# Patient Record
Sex: FEMALE | Race: WHITE | Employment: OTHER | URBAN - METROPOLITAN AREA
[De-identification: names, ages, dates, MRNs, and addresses within clinical notes are randomized per-mention and may not be internally consistent; named-entity substitution may affect disease eponyms.]

---

## 2017-01-03 ENCOUNTER — TELEPHONE (OUTPATIENT)
Dept: SURGERY | Facility: CLINIC | Age: 77
End: 2017-01-03

## 2017-01-09 ENCOUNTER — OFFICE VISIT (OUTPATIENT)
Dept: SURGERY | Facility: CLINIC | Age: 77
End: 2017-01-09

## 2017-01-09 VITALS
DIASTOLIC BLOOD PRESSURE: 62 MMHG | SYSTOLIC BLOOD PRESSURE: 122 MMHG | WEIGHT: 198 LBS | TEMPERATURE: 99 F | BODY MASS INDEX: 38.87 KG/M2 | HEIGHT: 60 IN | RESPIRATION RATE: 24 BRPM | HEART RATE: 88 BPM

## 2017-01-09 DIAGNOSIS — M50.30 DDD (DEGENERATIVE DISC DISEASE), CERVICAL: Primary | ICD-10-CM

## 2017-01-09 DIAGNOSIS — M50.30 DDD (DEGENERATIVE DISC DISEASE), CERVICAL: ICD-10-CM

## 2017-01-09 DIAGNOSIS — Z98.890 POST-OPERATIVE STATE: Primary | ICD-10-CM

## 2017-01-09 PROCEDURE — 99024 POSTOP FOLLOW-UP VISIT: CPT | Performed by: NEUROLOGICAL SURGERY

## 2017-01-09 RX ORDER — OMEGA-3-ACID ETHYL ESTERS 1 G/1
1 CAPSULE, LIQUID FILLED ORAL DAILY
Status: ON HOLD | COMMUNITY
End: 2017-03-31

## 2017-01-09 NOTE — PATIENT INSTRUCTIONS
Refill policies:    • Allow 2 business days for refills; controlled substances may take longer.   • Contact your pharmacy at least 5 days prior to running out of medication and have them send an electronic request or submit request through the “request re your physician has recommended that you have a procedure or additional testing performed. [facility]) will contact your insurance carrier to obtain pre-certification or prior authorization.     Unfortunately, ALICE has seen an increas

## 2017-01-09 NOTE — PROGRESS NOTES
[facility]   Outpatient Neurological Surgery Follow Up    Sahil Melara  : 1/10/1940  2017  PCP: Oc Espinosa MD  Referring Provider: No ref. provider found    REASON FOR VISIT:Patient presents with:   Other: fo patient will be scheduled for surgery to redirect the C6 screws in approximately 2 weeks after the patient gets over her cold. She will need new preoperative clearance and labs.   Also before the surgery I have ordered an MRI of the cervical spine without

## 2017-02-08 ENCOUNTER — TELEPHONE (OUTPATIENT)
Dept: SURGERY | Facility: CLINIC | Age: 77
End: 2017-02-08

## 2017-02-08 NOTE — TELEPHONE ENCOUNTER
LMTCB. Need to touch base with patient. She is to be scheduled for surgery, but was seen by her PCP for a cough and was going out of town. Need to see if patient would like to get scheduled for surgery.

## 2017-02-09 NOTE — TELEPHONE ENCOUNTER
Patient called back stating she is still having sinus issues. She is going to be following back up with her PCP. Also reviewed LOV plan with patient. Patient understood and will call our office back once she is ready to schedule surgery.  Patient was thankf

## 2017-02-13 ENCOUNTER — TELEPHONE (OUTPATIENT)
Dept: SURGERY | Facility: CLINIC | Age: 77
End: 2017-02-13

## 2017-02-13 NOTE — TELEPHONE ENCOUNTER
Patient asking if she has metal from her surgery with Dr. Sarah Jaeger  Informed her that she has titanium cervical plate  No other questions at this time.

## 2017-02-20 ENCOUNTER — HOSPITAL ENCOUNTER (OUTPATIENT)
Dept: MRI IMAGING | Facility: HOSPITAL | Age: 77
Discharge: HOME | End: 2017-02-20
Attending: NEUROLOGICAL SURGERY
Payer: MEDICARE

## 2017-02-20 DIAGNOSIS — M50.30 DDD (DEGENERATIVE DISC DISEASE), CERVICAL: ICD-10-CM

## 2017-02-20 PROCEDURE — 72141 MRI NECK SPINE W/O DYE: CPT

## 2017-02-24 ENCOUNTER — TELEPHONE (OUTPATIENT)
Dept: SURGERY | Facility: CLINIC | Age: 77
End: 2017-02-24

## 2017-02-27 NOTE — TELEPHONE ENCOUNTER
Spoke with patient and informed her that per the MRI report everything looked stable since her last scan. Informed her that  was out of the office this week, but we would have one of the other providers review imaging.  Patient understood and was th

## 2017-03-02 NOTE — TELEPHONE ENCOUNTER
MRI is stable from previous. Dr. Lobito Weber wanted this for surgical planning. The plan is for her to go back to surgery to redirect her screws. She will need clearance from her PCP. She feeling well enough at this point to schedule?

## 2017-03-09 NOTE — TELEPHONE ENCOUNTER
Spoke with patient she is clear of any active infections. She would like to have surgery scheduled after 3/25/17  Offered her 3/30/17 since she prefers an early morning start time. Patient will need PCP clearance and repeat all pre-operative labs.      P

## 2017-03-10 RX ORDER — DEXTROSE MONOHYDRATE 25 G/50ML
50 INJECTION, SOLUTION INTRAVENOUS
Status: DISCONTINUED | OUTPATIENT
Start: 2017-03-10 | End: 2017-03-30

## 2017-03-13 ENCOUNTER — LAB ENCOUNTER (OUTPATIENT)
Dept: LAB | Age: 77
End: 2017-03-13
Payer: MEDICARE

## 2017-03-13 ENCOUNTER — APPOINTMENT (OUTPATIENT)
Dept: LAB | Age: 77
End: 2017-03-13
Payer: MEDICARE

## 2017-03-13 DIAGNOSIS — R73.03 BORDERLINE TYPE 2 DIABETES MELLITUS: ICD-10-CM

## 2017-03-13 DIAGNOSIS — M48.02 CERVICAL STENOSIS OF SPINAL CANAL: ICD-10-CM

## 2017-03-13 LAB
APTT PPP: 36.5 SECONDS (ref 25–34)
ATRIAL RATE: 69 BPM
BASOPHILS # BLD AUTO: 0.06 X10(3) UL (ref 0–0.1)
BASOPHILS NFR BLD AUTO: 1.4 %
BILIRUB UR QL STRIP.AUTO: NEGATIVE
BUN BLD-MCNC: 16 MG/DL (ref 8–20)
CALCIUM BLD-MCNC: 9.2 MG/DL (ref 8.3–10.3)
CHLORIDE: 106 MMOL/L (ref 101–111)
CLARITY UR REFRACT.AUTO: CLEAR
CO2: 29 MMOL/L (ref 22–32)
CREAT BLD-MCNC: 0.7 MG/DL (ref 0.55–1.02)
EOSINOPHIL # BLD AUTO: 0.19 X10(3) UL (ref 0–0.3)
EOSINOPHIL NFR BLD AUTO: 4.3 %
ERYTHROCYTE [DISTWIDTH] IN BLOOD BY AUTOMATED COUNT: 14.1 % (ref 11.5–16)
GLUCOSE BLD-MCNC: 112 MG/DL (ref 70–99)
GLUCOSE UR STRIP.AUTO-MCNC: NEGATIVE MG/DL
HCT VFR BLD AUTO: 40.5 % (ref 34–50)
HGB BLD-MCNC: 13.4 G/DL (ref 12–16)
IMMATURE GRANULOCYTE COUNT: 0.02 X10(3) UL (ref 0–1)
IMMATURE GRANULOCYTE RATIO %: 0.5 %
INR BLD: 0.92 (ref 0.89–1.11)
KETONES UR STRIP.AUTO-MCNC: NEGATIVE MG/DL
LEUKOCYTE ESTERASE UR QL STRIP.AUTO: NEGATIVE
LYMPHOCYTES # BLD AUTO: 1.25 X10(3) UL (ref 0.9–4)
LYMPHOCYTES NFR BLD AUTO: 28.5 %
MCH RBC QN AUTO: 29.7 PG (ref 27–33.2)
MCHC RBC AUTO-ENTMCNC: 33.1 G/DL (ref 31–37)
MCV RBC AUTO: 89.8 FL (ref 81–100)
MONOCYTES # BLD AUTO: 0.36 X10(3) UL (ref 0.1–0.6)
MONOCYTES NFR BLD AUTO: 8.2 %
NEUTROPHIL ABS PRELIM: 2.51 X10 (3) UL (ref 1.3–6.7)
NEUTROPHILS # BLD AUTO: 2.51 X10(3) UL (ref 1.3–6.7)
NEUTROPHILS NFR BLD AUTO: 57.1 %
NITRITE UR QL STRIP.AUTO: NEGATIVE
P AXIS: 40 DEGREES
P-R INTERVAL: 170 MS
PH UR STRIP.AUTO: 7 [PH] (ref 4.5–8)
PLATELET # BLD AUTO: 229 10(3)UL (ref 150–450)
POTASSIUM SERPL-SCNC: 3.9 MMOL/L (ref 3.6–5.1)
PROT UR STRIP.AUTO-MCNC: NEGATIVE MG/DL
PSA SERPL DL<=0.01 NG/ML-MCNC: 12.3 SECONDS (ref 12–14.3)
Q-T INTERVAL: 444 MS
QRS DURATION: 84 MS
QTC CALCULATION (BEZET): 475 MS
R AXIS: 11 DEGREES
RBC # BLD AUTO: 4.51 X10(6)UL (ref 3.8–5.1)
RED CELL DISTRIBUTION WIDTH-SD: 45.7 FL (ref 35.1–46.3)
SODIUM SERPL-SCNC: 142 MMOL/L (ref 136–144)
SP GR UR STRIP.AUTO: 1.01 (ref 1–1.03)
T AXIS: 83 DEGREES
UROBILINOGEN UR STRIP.AUTO-MCNC: <2 MG/DL
VENTRICULAR RATE: 69 BPM
WBC # BLD AUTO: 4.4 X10(3) UL (ref 4–13)

## 2017-03-13 PROCEDURE — 36415 COLL VENOUS BLD VENIPUNCTURE: CPT

## 2017-03-13 PROCEDURE — 85610 PROTHROMBIN TIME: CPT

## 2017-03-13 PROCEDURE — 81001 URINALYSIS AUTO W/SCOPE: CPT

## 2017-03-13 PROCEDURE — 93005 ELECTROCARDIOGRAM TRACING: CPT

## 2017-03-13 PROCEDURE — 80048 BASIC METABOLIC PNL TOTAL CA: CPT

## 2017-03-13 PROCEDURE — 87081 CULTURE SCREEN ONLY: CPT

## 2017-03-13 PROCEDURE — 85025 COMPLETE CBC W/AUTO DIFF WBC: CPT

## 2017-03-13 PROCEDURE — 85730 THROMBOPLASTIN TIME PARTIAL: CPT

## 2017-03-13 PROCEDURE — 93010 ELECTROCARDIOGRAM REPORT: CPT | Performed by: INTERNAL MEDICINE

## 2017-03-14 NOTE — TELEPHONE ENCOUNTER
Patient completed pre-op labs 3/13/17. Patient will need to also see PCP for medical clearance.  (No appointment made as of yet in [facility] with PCP.)

## 2017-03-15 NOTE — TELEPHONE ENCOUNTER
Spoke to pt, informed her of message below, she stated she is aware and has appt scheduled for next week.

## 2017-03-16 ENCOUNTER — APPOINTMENT (OUTPATIENT)
Dept: LAB | Age: 77
End: 2017-03-16
Attending: NEUROLOGICAL SURGERY
Payer: MEDICARE

## 2017-03-16 LAB
ANTIBODY SCREEN: NEGATIVE
RH BLOOD TYPE: POSITIVE

## 2017-03-16 PROCEDURE — 86901 BLOOD TYPING SEROLOGIC RH(D): CPT

## 2017-03-16 PROCEDURE — 86900 BLOOD TYPING SEROLOGIC ABO: CPT

## 2017-03-16 PROCEDURE — 36415 COLL VENOUS BLD VENIPUNCTURE: CPT

## 2017-03-16 PROCEDURE — 86850 RBC ANTIBODY SCREEN: CPT

## 2017-03-22 NOTE — TELEPHONE ENCOUNTER
No prior authorization needed, patient has straight Medicare (checked with insr person in office)  PCP clearance in EPIC.

## 2017-03-29 ENCOUNTER — ANESTHESIA EVENT (OUTPATIENT)
Dept: SURGERY | Facility: HOSPITAL | Age: 77
End: 2017-03-29
Payer: MEDICARE

## 2017-03-30 ENCOUNTER — SURGERY (OUTPATIENT)
Age: 77
End: 2017-03-30

## 2017-03-30 ENCOUNTER — TELEPHONE (OUTPATIENT)
Dept: SURGERY | Facility: CLINIC | Age: 77
End: 2017-03-30

## 2017-03-30 ENCOUNTER — ANESTHESIA (OUTPATIENT)
Dept: SURGERY | Facility: HOSPITAL | Age: 77
End: 2017-03-30
Payer: MEDICARE

## 2017-03-30 ENCOUNTER — HOSPITAL ENCOUNTER (OUTPATIENT)
Facility: HOSPITAL | Age: 77
Setting detail: OBSERVATION
Discharge: HOME | End: 2017-03-31
Attending: NEUROLOGICAL SURGERY | Admitting: NEUROLOGICAL SURGERY
Payer: MEDICARE

## 2017-03-30 ENCOUNTER — APPOINTMENT (OUTPATIENT)
Dept: GENERAL RADIOLOGY | Facility: HOSPITAL | Age: 77
End: 2017-03-30
Attending: NEUROLOGICAL SURGERY
Payer: MEDICARE

## 2017-03-30 DIAGNOSIS — R73.03 BORDERLINE TYPE 2 DIABETES MELLITUS: Primary | ICD-10-CM

## 2017-03-30 DIAGNOSIS — M48.02 CERVICAL STENOSIS OF SPINAL CANAL: ICD-10-CM

## 2017-03-30 DIAGNOSIS — M50.30 DDD (DEGENERATIVE DISC DISEASE), CERVICAL: ICD-10-CM

## 2017-03-30 PROCEDURE — 85730 THROMBOPLASTIN TIME PARTIAL: CPT

## 2017-03-30 PROCEDURE — 0PW304Z REVISION OF INTERNAL FIXATION DEVICE IN CERVICAL VERTEBRA, OPEN APPROACH: ICD-10-PCS | Performed by: NEUROLOGICAL SURGERY

## 2017-03-30 PROCEDURE — 95861 NEEDLE EMG 2 EXTREMITIES: CPT | Performed by: NEUROLOGICAL SURGERY

## 2017-03-30 PROCEDURE — 72020 X-RAY EXAM OF SPINE 1 VIEW: CPT

## 2017-03-30 PROCEDURE — 95938 SOMATOSENSORY TESTING: CPT | Performed by: NEUROLOGICAL SURGERY

## 2017-03-30 PROCEDURE — 0PP304Z REMOVAL OF INTERNAL FIXATION DEVICE FROM CERVICAL VERTEBRA, OPEN APPROACH: ICD-10-PCS | Performed by: NEUROLOGICAL SURGERY

## 2017-03-30 PROCEDURE — 82962 GLUCOSE BLOOD TEST: CPT

## 2017-03-30 PROCEDURE — 95940 IONM IN OPERATNG ROOM 15 MIN: CPT | Performed by: NEUROLOGICAL SURGERY

## 2017-03-30 RX ORDER — SODIUM CHLORIDE 9 MG/ML
INJECTION, SOLUTION INTRAVENOUS CONTINUOUS
Status: DISCONTINUED | OUTPATIENT
Start: 2017-03-30 | End: 2017-03-31

## 2017-03-30 RX ORDER — HYDROMORPHONE HYDROCHLORIDE 1 MG/ML
0.4 INJECTION, SOLUTION INTRAMUSCULAR; INTRAVENOUS; SUBCUTANEOUS EVERY 5 MIN PRN
Status: DISCONTINUED | OUTPATIENT
Start: 2017-03-30 | End: 2017-03-30 | Stop reason: HOSPADM

## 2017-03-30 RX ORDER — SODIUM CHLORIDE, SODIUM LACTATE, POTASSIUM CHLORIDE, CALCIUM CHLORIDE 600; 310; 30; 20 MG/100ML; MG/100ML; MG/100ML; MG/100ML
INJECTION, SOLUTION INTRAVENOUS CONTINUOUS
Status: DISCONTINUED | OUTPATIENT
Start: 2017-03-30 | End: 2017-03-31

## 2017-03-30 RX ORDER — ONDANSETRON 2 MG/ML
INJECTION INTRAMUSCULAR; INTRAVENOUS
Status: COMPLETED
Start: 2017-03-30 | End: 2017-03-30

## 2017-03-30 RX ORDER — BACITRACIN 50000 [USP'U]/1
INJECTION, POWDER, LYOPHILIZED, FOR SOLUTION INTRAMUSCULAR AS NEEDED
Status: DISCONTINUED | OUTPATIENT
Start: 2017-03-30 | End: 2017-03-30 | Stop reason: HOSPADM

## 2017-03-30 RX ORDER — MORPHINE SULFATE 2 MG/ML
1 INJECTION, SOLUTION INTRAMUSCULAR; INTRAVENOUS EVERY 2 HOUR PRN
Status: DISCONTINUED | OUTPATIENT
Start: 2017-03-30 | End: 2017-03-31

## 2017-03-30 RX ORDER — DIPHENHYDRAMINE HYDROCHLORIDE 50 MG/ML
25 INJECTION INTRAMUSCULAR; INTRAVENOUS EVERY 4 HOURS PRN
Status: DISCONTINUED | OUTPATIENT
Start: 2017-03-30 | End: 2017-03-31

## 2017-03-30 RX ORDER — MORPHINE SULFATE 4 MG/ML
4 INJECTION, SOLUTION INTRAMUSCULAR; INTRAVENOUS EVERY 2 HOUR PRN
Status: DISCONTINUED | OUTPATIENT
Start: 2017-03-30 | End: 2017-03-31

## 2017-03-30 RX ORDER — METOCLOPRAMIDE HYDROCHLORIDE 5 MG/ML
10 INJECTION INTRAMUSCULAR; INTRAVENOUS EVERY 6 HOURS PRN
Status: DISCONTINUED | OUTPATIENT
Start: 2017-03-30 | End: 2017-03-31

## 2017-03-30 RX ORDER — ONDANSETRON 2 MG/ML
4 INJECTION INTRAMUSCULAR; INTRAVENOUS EVERY 4 HOURS PRN
Status: DISCONTINUED | OUTPATIENT
Start: 2017-03-30 | End: 2017-03-31

## 2017-03-30 RX ORDER — ACETAMINOPHEN 500 MG
1000 TABLET ORAL EVERY 6 HOURS
Status: DISCONTINUED | OUTPATIENT
Start: 2017-03-30 | End: 2017-03-31

## 2017-03-30 RX ORDER — SODIUM CHLORIDE, SODIUM LACTATE, POTASSIUM CHLORIDE, CALCIUM CHLORIDE 600; 310; 30; 20 MG/100ML; MG/100ML; MG/100ML; MG/100ML
INJECTION, SOLUTION INTRAVENOUS CONTINUOUS
Status: DISCONTINUED | OUTPATIENT
Start: 2017-03-30 | End: 2017-03-30 | Stop reason: HOSPADM

## 2017-03-30 RX ORDER — CYCLOBENZAPRINE HCL 10 MG
10 TABLET ORAL EVERY 8 HOURS PRN
Status: DISCONTINUED | OUTPATIENT
Start: 2017-03-30 | End: 2017-03-31

## 2017-03-30 RX ORDER — DEXAMETHASONE SODIUM PHOSPHATE 4 MG/ML
4 VIAL (ML) INJECTION EVERY 6 HOURS
Status: COMPLETED | OUTPATIENT
Start: 2017-03-30 | End: 2017-03-31

## 2017-03-30 RX ORDER — DIPHENHYDRAMINE HCL 25 MG
25 CAPSULE ORAL EVERY 4 HOURS PRN
Status: DISCONTINUED | OUTPATIENT
Start: 2017-03-30 | End: 2017-03-31

## 2017-03-30 RX ORDER — METOCLOPRAMIDE HYDROCHLORIDE 5 MG/ML
10 INJECTION INTRAMUSCULAR; INTRAVENOUS AS NEEDED
Status: DISCONTINUED | OUTPATIENT
Start: 2017-03-30 | End: 2017-03-30 | Stop reason: HOSPADM

## 2017-03-30 RX ORDER — DEXTROSE MONOHYDRATE 25 G/50ML
50 INJECTION, SOLUTION INTRAVENOUS
Status: DISCONTINUED | OUTPATIENT
Start: 2017-03-30 | End: 2017-03-30 | Stop reason: HOSPADM

## 2017-03-30 RX ORDER — TRAMADOL HYDROCHLORIDE 50 MG/1
100 TABLET ORAL EVERY 6 HOURS PRN
Status: DISCONTINUED | OUTPATIENT
Start: 2017-03-30 | End: 2017-03-31

## 2017-03-30 RX ORDER — NALOXONE HYDROCHLORIDE 0.4 MG/ML
80 INJECTION, SOLUTION INTRAMUSCULAR; INTRAVENOUS; SUBCUTANEOUS AS NEEDED
Status: DISCONTINUED | OUTPATIENT
Start: 2017-03-30 | End: 2017-03-30 | Stop reason: HOSPADM

## 2017-03-30 RX ORDER — ORPHENADRINE CITRATE 30 MG/ML
30 INJECTION INTRAMUSCULAR; INTRAVENOUS EVERY 6 HOURS
Status: DISCONTINUED | OUTPATIENT
Start: 2017-03-30 | End: 2017-03-31

## 2017-03-30 RX ORDER — ONDANSETRON 2 MG/ML
4 INJECTION INTRAMUSCULAR; INTRAVENOUS AS NEEDED
Status: DISCONTINUED | OUTPATIENT
Start: 2017-03-30 | End: 2017-03-30 | Stop reason: HOSPADM

## 2017-03-30 RX ORDER — MORPHINE SULFATE 2 MG/ML
2 INJECTION, SOLUTION INTRAMUSCULAR; INTRAVENOUS EVERY 2 HOUR PRN
Status: DISCONTINUED | OUTPATIENT
Start: 2017-03-30 | End: 2017-03-31

## 2017-03-30 NOTE — ANESTHESIA PREPROCEDURE EVALUATION
PRE-OP EVALUATION    Patient Name: Pat Melara    Pre-op Diagnosis: DDD (degenerative disc disease), cervical [M50.30]    Procedure(s): Anterior cervical wound exploration for redirection of the screws at Cervical 6 level.   Possible discectomy of COLONOSCOPY  5/20/2014    Comment Procedure: COLONOSCOPY;  Surgeon: Donnie Love MD;  Location: [facility] ENDOSCOPY    INJECTION, W/WO CONTRAST, DX/THERAPEUTIC SUBSTANCE, EPIDURAL/SUBARACHNOID; CERVICAL/THORACIC N/A 3/29/2016    Comment Procedure: CERVICAL EPI CERVICAL EPIDURAL;  Surgeon: Leandro Jose MD;  Location: [facility] FOR PAIN MANAGEMENT    OTHER Right     Comment achilles tendon repair x 2    HYSTERECTOMY            Comment x 4    OTHER  2016    Comment ANTERIOR CERVICAL DISCECTOMY AND spouse                Present on Admission:   **None**

## 2017-03-30 NOTE — PROGRESS NOTES
Patient arrived on unit s/p revision of hardware at C6. Dressing to anterior neck CDI. Patient alert and oriented. Denies pain when asked. Started on sips of H20 and ice chips, tolerating well. Will monitor patient for any swallowing issues ongoing.    Dr Aiyana Beyer

## 2017-03-30 NOTE — ANESTHESIA POSTPROCEDURE EVALUATION
[address] Patient Status:  Surgery Admit   Age/Gender 78year old female MRN [id number]   [facility] SURGERY Attending Betty Marlow DO   Hosp Day # 0 PCP Yaritza Fuentes MD       Anesthesia Post-op Not

## 2017-03-30 NOTE — H&P
NEUROSURGERY INITIAL CLINIC VISIT - NEUROSURGEON    PATIENT NAME: Joselyn Conklin, : 1/10/1940, 78year old female   MR# [id number] [facility]# [id number]    CHIEF COMPLAINT:   Neck stiffness    HPI:   This is a 78 F that had an ACDF of C4/5 and C5/6 in Nov SURGICAL SITE INFECTION PROPHYLAXIS.  N/A 3/29/2016    Comment Procedure: CERVICAL EPIDURAL;  Surgeon: Lisa Barnett MD;  Location: [facility] PAIN MANA[facility]EMENT    PATIENT DOCUMENTED NOT TO HAVE EXPERIENCED ANY OF THE FOLLOWING EVENTS N/A 3/29/2016    C UNLISTED  1/2017    Comment AT C6        FAMILY HISTORY:  History reviewed. No pertinent family history.     SOCIAL HISTORY:  Social History    Marital Status:              Spouse Name:                       Years of Education:                 Number PLAN:  Reposition c6 screws and potential fuse c6/7 from and anterior approach. ACDF c6/7. Total time spent 10 minutes. % of time spent educating: Greater than 50% spent on counseling, education, and coordination of care. Jsoe Calvillo, DO  3

## 2017-03-30 NOTE — BRIEF OP NOTE
[address] SURGERY  Brief Op Note     Claude Melara Location: OR   [facility] [id number] MRN [id number]   Admission Date 3/30/2017 Operation Date 3/30/2017   Attending Physician Hortensia Heck DO Operating Physician Alisha Colmenares.  Jamal Mcmahan       Pre-Oper

## 2017-03-31 ENCOUNTER — APPOINTMENT (OUTPATIENT)
Dept: GENERAL RADIOLOGY | Facility: HOSPITAL | Age: 77
End: 2017-03-31
Attending: NEUROLOGICAL SURGERY
Payer: MEDICARE

## 2017-03-31 VITALS
RESPIRATION RATE: 20 BRPM | BODY MASS INDEX: 39.27 KG/M2 | DIASTOLIC BLOOD PRESSURE: 55 MMHG | HEIGHT: 60 IN | HEART RATE: 70 BPM | TEMPERATURE: 98 F | OXYGEN SATURATION: 93 % | SYSTOLIC BLOOD PRESSURE: 121 MMHG | WEIGHT: 200 LBS

## 2017-03-31 PROCEDURE — 72040 X-RAY EXAM NECK SPINE 2-3 VW: CPT

## 2017-03-31 NOTE — PROGRESS NOTES
Patient discharged to home this morning. Patient to follow up with Dr Lobito Weber on 4/12/17 as scheduled, patient to follow up with her PCP in 1 week as well.    Discharge teaching done at bedside with patient and spouse by RN and José Gee RN prior to depart this

## 2017-03-31 NOTE — PLAN OF CARE
PAIN - ADULT    • Verbalizes/displays adequate comfort level or patient's stated pain goal Adequate for Discharge        SAFETY ADULT - FALL    • Free from fall injury Adequate for Discharge        SKIN/TISSUE INTEGRITY - ADULT    • Incision(s), wounds(s)

## 2017-03-31 NOTE — PROGRESS NOTES
[id number] [facility] Progress Note    Andres Coats [facility] Patient Status:  Observation    1/10/1940 MRN [id number]   [facility] 3SW-A Attending Kevin French, DO   Hosp Day # 1 PCP Marciano Perez MD     Subjective:

## 2017-03-31 NOTE — PROGRESS NOTES
POD #1 spine newsletter and swallowing precautions provided to patient. Reviewed indications, side effects of pain medication/narcotics and constipation prevention.  Stressed importance of increased fluids/roughage in diet, continued use stool softeners sarah

## 2017-04-03 NOTE — OPERATIVE REPORT
[facility]  Operative report    Mihai Conway Location: OR   CSN [id number] MRN [id number]   Admission Date 3/30/2017 Operation Date 3/30/2017   Attending Physician No att. providers found Operating Physician Rhonda Pop., DO     Pre-Operativ timeout was performed. The patient was prepped and draped in a sterile fashion. The same incision was used in the left anterior neck. Local anesthetic was instilled within the previous incisional scar.   The incision was opened sharply with a 15 blade was dressed with iodinated Steri-Strips and a 4 x 4 and paper tape due to the patient's allergy to adhesive. No Mastisol or benzoin was used. The patient was allowed to awaken a careful and controlled fashion.   Once extubated the patient was brought to

## 2017-04-04 ENCOUNTER — HOSPITAL ENCOUNTER (OUTPATIENT)
Dept: GENERAL RADIOLOGY | Age: 77
Discharge: HOME | End: 2017-04-04
Attending: NURSE PRACTITIONER
Payer: MEDICARE

## 2017-04-04 DIAGNOSIS — M48.02 CERVICAL STENOSIS OF SPINAL CANAL: ICD-10-CM

## 2017-04-04 PROCEDURE — 72040 X-RAY EXAM NECK SPINE 2-3 VW: CPT

## 2017-04-12 ENCOUNTER — OFFICE VISIT (OUTPATIENT)
Dept: SURGERY | Facility: CLINIC | Age: 77
End: 2017-04-12

## 2017-04-12 VITALS — SYSTOLIC BLOOD PRESSURE: 130 MMHG | DIASTOLIC BLOOD PRESSURE: 70 MMHG | HEART RATE: 78 BPM | RESPIRATION RATE: 15 BRPM

## 2017-04-12 DIAGNOSIS — Z98.1 S/P CERVICAL SPINAL FUSION: ICD-10-CM

## 2017-04-12 DIAGNOSIS — M50.30 DDD (DEGENERATIVE DISC DISEASE), CERVICAL: ICD-10-CM

## 2017-04-12 DIAGNOSIS — Z98.890 POST-OPERATIVE STATE: Primary | ICD-10-CM

## 2017-04-12 PROCEDURE — 99024 POSTOP FOLLOW-UP VISIT: CPT | Performed by: NURSE PRACTITIONER

## 2017-04-12 NOTE — PROGRESS NOTES
Neurosurgery Cervical  Follow up      [address] [place] [address] PCP:  Lowella Homans, MD    1/10/1940 MRN [id number]         Patient presents with:  Post-Op: sx 3/30/2017    REASON FOR VISIT: 2 week post op visit    HISTORY OF PRESENT ILLNESS:Zunilda       5          IMAGING:  X-ray cervical spine 4/4/17  Prevertebral tissue swelling. Anterior interbody fusion C4, C5, C6 with metal plate, screws and disc prostheses. Hardware in good position      ASSESSMENT and PLAN:  1. Post-operative state    2.  S/P

## 2017-04-12 NOTE — PATIENT INSTRUCTIONS
Refill policies:    • Allow 2 business days for refills; controlled substances may take longer.   • Contact your pharmacy at least 5 days prior to running out of medication and have them send an electronic request or submit request through the “request re insurance carrier to obtain pre-certification or prior authorization. Unfortunately, ALICE has seen an increase in denial of payment even though the procedure/test has been pre-certified.   You are strongly encouraged to contact your insurance carrier to v

## 2017-05-08 ENCOUNTER — OFFICE VISIT (OUTPATIENT)
Dept: SURGERY | Facility: CLINIC | Age: 77
End: 2017-05-08

## 2017-05-08 VITALS
HEART RATE: 80 BPM | RESPIRATION RATE: 16 BRPM | WEIGHT: 198 LBS | DIASTOLIC BLOOD PRESSURE: 70 MMHG | HEIGHT: 60 IN | BODY MASS INDEX: 38.87 KG/M2 | SYSTOLIC BLOOD PRESSURE: 106 MMHG

## 2017-05-08 DIAGNOSIS — R49.9 VOICE COMPLAINT: Primary | ICD-10-CM

## 2017-05-08 PROCEDURE — 99024 POSTOP FOLLOW-UP VISIT: CPT | Performed by: NEUROLOGICAL SURGERY

## 2017-05-08 NOTE — PROGRESS NOTES
Here for post op visit, feels lump to her incision, she has no volume strength to her voice. No problems with eating. No problems noted with incision.

## 2017-05-11 ENCOUNTER — TELEPHONE (OUTPATIENT)
Dept: SURGERY | Facility: CLINIC | Age: 77
End: 2017-05-11

## 2017-05-11 DIAGNOSIS — Z98.1 STATUS POST CERVICAL SPINAL FUSION: Primary | ICD-10-CM

## 2017-05-19 PROBLEM — R49.0 HOARSENESS: Status: ACTIVE | Noted: 2017-05-19

## 2017-05-24 ENCOUNTER — OFFICE VISIT (OUTPATIENT)
Dept: SURGERY | Facility: CLINIC | Age: 77
End: 2017-05-24

## 2017-05-24 VITALS
TEMPERATURE: 98 F | WEIGHT: 198 LBS | HEIGHT: 60 IN | HEART RATE: 93 BPM | SYSTOLIC BLOOD PRESSURE: 105 MMHG | DIASTOLIC BLOOD PRESSURE: 69 MMHG | BODY MASS INDEX: 38.87 KG/M2

## 2017-05-24 DIAGNOSIS — L90.5 SCAR OF NECK: Primary | ICD-10-CM

## 2017-05-24 PROCEDURE — 99203 OFFICE O/P NEW LOW 30 MIN: CPT | Performed by: SURGERY

## 2017-05-24 NOTE — CONSULTS
New Patient Consultation    Chief Complaint: neck scar after spine surgery    History of Present Illness:   Keily Camacho is a 78year old female referred by Dr. Roxana Abdalla for evaluation of neck scar after revision spine surgery.  She has minimal discomfo PAIN MANAGEMENT    PATIENT DOCUMENTED NOT TO HAVE EXPERIENCED ANY OF THE FOLLOWING EVENTS N/A 5/4/2016    Comment Procedure: CERVICAL EPIDURAL;  Surgeon: Demetris Henriquez MD;  Location: [facility] PAIN MANAGEMENT    INJECTION, W/WO CONTRAST, DX/THERAPEU intake sheet. The patient reports see HPI  General:   The patient denies, fever, chills, night sweats, fatigue, generalized weakness, change in appetite, weight loss, or weight gain. Endocrine:    There is no history of poor/slow wound healing, weight los muscle aches/pain, joint pain, stiff joints,+ neck pain, back pain or bone pain.   Neurologic:  There is no history of migraines or severe headaches, seizure/epilepsy, speech problems, coordination problems, trembling/tremors, fainting/black outs, dizziness another 2 months as at this point in time she is at a maximum for the density of her scar tissue. With scar massage this will usually soften up and we discussed additional silicone sheet treatment for the scarring.     I will see her back in approximately

## 2017-07-19 ENCOUNTER — OFFICE VISIT (OUTPATIENT)
Dept: SURGERY | Facility: CLINIC | Age: 77
End: 2017-07-19

## 2017-07-19 VITALS
TEMPERATURE: 98 F | DIASTOLIC BLOOD PRESSURE: 80 MMHG | BODY MASS INDEX: 39.46 KG/M2 | HEIGHT: 60 IN | SYSTOLIC BLOOD PRESSURE: 123 MMHG | HEART RATE: 86 BPM | WEIGHT: 201 LBS

## 2017-07-19 DIAGNOSIS — L90.5 SCAR OF NECK: Primary | ICD-10-CM

## 2017-07-19 PROCEDURE — 99213 OFFICE O/P EST LOW 20 MIN: CPT | Performed by: SURGERY

## 2017-07-19 NOTE — CONSULTS
Estabilshed Patient Consultation    Chief Complaint: neck scar    History of Present Illness: Wayne Encarnacion is a 78year old female referred by Dr. Ana Dumont for evaluation of neck scar after revision spine surgery from march 2017.  She has minimal discom OTHER      Comment: Anterior cervical wound exploration for                redirection of the screws at Cervical 6 level,                Dr. Duong Reading  3/29/2016: PATIENT DOCUMENTED NOT TO HAVE EXPERIENCED ANY* N/A      Comment: Procedure: CERVICAL EPIDURAL; Smoker   Smokeless tobacco: Never Used         Drug use: No           Review of Systems:    The patient reports see HPI  All other systems are unremarkable.       Physical Exam:    /80 (BP Location: Left arm, Patient Position: Sitting, Cuff Size: adul will heal and contract down to the underlying platysma deeper tissue specifically since she had 2 prior surgeries.   Therefore I recommend not rushing into a revision surgery at this point in time but will see her back as needed 1 year after the surgery.

## 2018-12-27 ENCOUNTER — WALK IN (OUTPATIENT)
Dept: URGENT CARE | Age: 78
End: 2018-12-27

## 2018-12-27 VITALS
RESPIRATION RATE: 18 BRPM | DIASTOLIC BLOOD PRESSURE: 70 MMHG | OXYGEN SATURATION: 97 % | WEIGHT: 180 LBS | SYSTOLIC BLOOD PRESSURE: 118 MMHG | BODY MASS INDEX: 35.34 KG/M2 | TEMPERATURE: 98.1 F | HEART RATE: 96 BPM | HEIGHT: 60 IN

## 2018-12-27 DIAGNOSIS — H61.21 IMPACTED CERUMEN OF RIGHT EAR: Primary | ICD-10-CM

## 2018-12-27 DIAGNOSIS — H66.002 ACUTE SUPPURATIVE OTITIS MEDIA OF LEFT EAR WITHOUT SPONTANEOUS RUPTURE OF TYMPANIC MEMBRANE, RECURRENCE NOT SPECIFIED: ICD-10-CM

## 2018-12-27 PROCEDURE — 69209 REMOVE IMPACTED EAR WAX UNI: CPT | Performed by: NURSE PRACTITIONER

## 2018-12-27 PROCEDURE — 99203 OFFICE O/P NEW LOW 30 MIN: CPT | Performed by: NURSE PRACTITIONER

## 2018-12-27 RX ORDER — AMOXICILLIN 875 MG/1
875 TABLET, COATED ORAL EVERY 12 HOURS
Qty: 20 TABLET | Refills: 0 | Status: SHIPPED | OUTPATIENT
Start: 2018-12-27 | End: 2019-01-06

## 2018-12-27 ASSESSMENT — ENCOUNTER SYMPTOMS
SINUS PAIN: 0
RHINORRHEA: 0
ALLERGIC/IMMUNOLOGIC NEGATIVE: 1
SORE THROAT: 0
SINUS PRESSURE: 0
CONSTITUTIONAL NEGATIVE: 1
RESPIRATORY NEGATIVE: 1
NEUROLOGICAL NEGATIVE: 1

## 2019-12-03 NOTE — LETTER
[facility]  Jovan Milton 63 [address] [facility], [address] [place] [address]    Consent for Operation    Date: __________________    Time: _______________    1. I authorize the performance upon Morelia Rodgers the following operation:    Procedure(s):   Anterior cervica Subjective   Chief Complaint   Patient presents with   • Gout     started Sat       History of Present Illness     Pt started with right foot pain 3 days ago after thanksgiving. She did not have any alcohol or high purine foods that she can remember. She has been on allopurinol 300 mg daily for a cpl of years now and has not had a gout flare in 2 yrs. Very tender to touch, difficult to walk on. No injury. No missed dose of her allopurinol.        Patient Active Problem List   Diagnosis   • Other specified hypothyroidism   • Anemia   • B12 deficiency   • Hypertension   • Hyperlipidemia   • Tubular adenoma   • Morbidly obese (CMS/HCC)       Allergies   Allergen Reactions   • Contrast Dye Hives       Current Outpatient Medications on File Prior to Visit   Medication Sig Dispense Refill   • allopurinol (ZYLOPRIM) 300 MG tablet Take 300 mg by mouth Daily.     • amLODIPine (NORVASC) 10 MG tablet Take 10 mg by mouth Daily.     • aspirin 81 MG chewable tablet Chew 81 mg Daily.     • atorvastatin (LIPITOR) 40 MG tablet Take 40 mg by mouth Daily.     • benazepril (LOTENSIN) 40 MG tablet Take 40 mg by mouth Daily.     • folic acid (FOLVITE) 1 MG tablet Take 1 mg by mouth Daily.     • glipiZIDE (GLUCOTROL XL) 10 MG 24 hr tablet Take 10 mg by mouth 2 (Two) Times a Day.     • glucose blood test strip 1 each by Other route As Needed. Use as instructed     • hydrochlorothiazide (HYDRODIURIL) 25 MG tablet Take 25 mg by mouth Daily.     • Insulin Glargine (LANTUS SOLOSTAR) 100 UNIT/ML injection pen Inject 52 Units under the skin into the appropriate area as directed Daily.     • Insulin Pen Needle 31G X 5 MM misc      • Lancets 30G misc      • levothyroxine (SYNTHROID, LEVOTHROID) 112 MCG tablet Take 112 mcg by mouth Daily.     • levothyroxine (SYNTHROID, LEVOTHROID) 200 MCG tablet Take 200 mcg by mouth Daily.     • Liraglutide (VICTOZA) 18 MG/3ML solution pen-injector injection Inject 1.2 mg under the skin into the appropriate area  as directed Daily.     • metFORMIN (GLUCOPHAGE) 1000 MG tablet Take 1,000 mg by mouth 2 (Two) Times a Day With Meals.     • metoprolol succinate XL (TOPROL-XL) 50 MG 24 hr tablet Take 1 tablet by mouth Daily. 90 tablet 0   • olmesartan (BENICAR) 40 MG tablet Take 40 mg by mouth Daily.     • TURMERIC PO Take  by mouth.     • vitamin B-12 (CYANOCOBALAMIN) 1000 MCG tablet Take 1,000 mcg by mouth Daily.       No current facility-administered medications on file prior to visit.        Past Medical History:   Diagnosis Date   • Allergic    • Allergic rhinitis    • Anemia    • Arthritis    • B12 deficiency    • Cataract    • Diverticulosis    • Fatty liver    • Gout    • Heart murmur    • Hemangioma    • Hyperlipidemia    • Lipoma    • LVH (left ventricular hypertrophy)    • Obesity    • ELLA (obstructive sleep apnea)    • Type II diabetes mellitus (CMS/HCC)        Family History   Problem Relation Age of Onset   • Hypertension Mother    • Other Mother         fall   • Hypertension Father    • Bone cancer Father    • Lung cancer Father    • Diabetes Brother    • Cancer Brother         sinus   • Hypertension Maternal Grandmother    • Stroke Maternal Grandmother    • Dementia Paternal Grandmother    • Alzheimer's disease Paternal Grandmother    • Stroke Paternal Grandfather        Social History     Socioeconomic History   • Marital status: Unknown     Spouse name: Not on file   • Number of children: Not on file   • Years of education: Not on file   • Highest education level: Not on file   Tobacco Use   • Smoking status: Never Smoker   • Smokeless tobacco: Never Used   Substance and Sexual Activity   • Alcohol use: Yes     Frequency: 2-4 times a month     Drinks per session: 1 or 2     Comment: twice a month   • Drug use: No   • Sexual activity: Defer       Past Surgical History:   Procedure Laterality Date   • BREAST BIOPSY     • BREAST LUMPECTOMY     • BREAST SURGERY     • CATARACT EXTRACTION     • CHOLECYSTECTOMY     •  revealed by the pictures or by descriptive texts accompanying them. If the procedure has been videotaped, the surgeon will obtain the original videotape. The hospital will not be responsible for storage or maintenance of this tape.     6. For the purpose of "COLONOSCOPY  2013   • COLONOSCOPY N/A 7/3/2019    Procedure: COLONOSCOPY to cecum with biopsy / polypectomy;  Surgeon: Royal Zuleta MD;  Location: [facility] ENDOSCOPY;  Service: General   • GALLBLADDER SURGERY     • HYSTERECTOMY  1998    FOR BLEEDING   • THYROID SURGERY      nodule removed left thyroid   • THYROIDECTOMY  1998    total thyroidectomy       The following portions of the patient's history were reviewed and updated as appropriate: problem list, allergies, current medications, past medical history, past family history, past social history and past surgical history.    Review of Systems   Musculoskeletal: Positive for gout and joint pain.        Right foot pain       Immunization History   Administered Date(s) Administered   • Fluzone High Dose =>65 Years ([employer] ONLY) 10/25/2018, 10/03/2019   • Hepatitis A 12/12/2017, 06/11/2018   • Hepatitis B 12/12/2017, 06/11/2018   • Hepatitis B Vaccine Adult IM 10/07/2019   • Pneumococcal Conjugate 13-Valent (PCV13) 05/18/2015   • Pneumococcal Polysaccharide (PPSV23) 01/01/2011   • Tdap 01/01/2013   • Zostavax 01/01/2012       Objective   Vitals:    12/03/19 1049 12/03/19 1101   BP:  120/70   Weight: 122 kg (270 lb)    Height: 162.6 cm (64\")      Body mass index is 46.35 kg/m².  Physical Exam   Constitutional: She appears well-developed and well-nourished.   Musculoskeletal:   Right lateral malleolus is tender to touch with mild swelling. No erythema. Mild tenderness over base of right great toe as well.    Vitals reviewed.      Assessment/Plan   Lynnette was seen today for gout.    Diagnoses and all orders for this visit:    Acute gout of right foot, unspecified cause  -     Uric Acid  -     colchicine 0.6 MG tablet; Take 2 tablets once and 1 tablet 1 hr later. Then 1 po until sx resolve.      Uric acid level today has been some time since she had a gout flare. Will go ahead and send in colchicine. If uric acid is nml will have her do x ray of right foot.          " THAT MY DOCTOR PROVIDED ME WITH THE ABOVE EXPLANATIONS, THAT ALL BLANKS OR STATEMENTS REQUIRING INSERTION OR COMPLETION WERE FILLED IN.     Signature of Patient:   ___________________________    When the patient is a minor or mentally incompetent to give co · All of the medicines I take (including prescriptions, herbal supplements, and pills I can buy without a prescription (including street drugs/illegal medications).  Failure to inform my anesthesiologist about these medicines may increase my risk of anesthe _____________________________________________________________________________  Anesthesiologist Signature     Date   Time  I have discussed the procedure and information above with the patient (or patient’s representative) and answered their questions.  The

## 2020-01-16 NOTE — PROGRESS NOTES
[facility]   Outpatient [facility]rological [facility] Follow Up    Uriah Melara  : 1/10/1940  2015  PCP: Sydnee Mosley MD  Referring Provider: No ref.  provider found    REASON FOR VISIT:Patient presents with:  Post-Op: singing at [employer] we will send her to ENT for a nasopharyngoscopy for direct visualization of her vocal cord function.   Also because the patient is unhappy with the look of the incision I will contact Dr. Jovanny Patel from plastic surgery and will attempt a mendoza 0

## 2020-11-24 ENCOUNTER — APPOINTMENT (OUTPATIENT)
Dept: CT IMAGING | Facility: HOSPITAL | Age: 80
DRG: 439 | End: 2020-11-24
Attending: EMERGENCY MEDICINE
Payer: MEDICARE

## 2020-11-24 ENCOUNTER — HOSPITAL ENCOUNTER (INPATIENT)
Facility: HOSPITAL | Age: 80
LOS: 3 days | Discharge: HOME | DRG: 439 | End: 2020-11-27
Attending: EMERGENCY MEDICINE | Admitting: HOSPITALIST
Payer: MEDICARE

## 2020-11-24 ENCOUNTER — APPOINTMENT (OUTPATIENT)
Dept: ULTRASOUND IMAGING | Facility: HOSPITAL | Age: 80
DRG: 439 | End: 2020-11-24
Attending: EMERGENCY MEDICINE
Payer: MEDICARE

## 2020-11-24 DIAGNOSIS — R10.84 ABDOMINAL PAIN, GENERALIZED: Primary | ICD-10-CM

## 2020-11-24 DIAGNOSIS — K85.90 ACUTE PANCREATITIS, UNSPECIFIED COMPLICATION STATUS, UNSPECIFIED PANCREATITIS TYPE: ICD-10-CM

## 2020-11-24 PROCEDURE — 96365 THER/PROPH/DIAG IV INF INIT: CPT

## 2020-11-24 PROCEDURE — 71275 CT ANGIOGRAPHY CHEST: CPT | Performed by: EMERGENCY MEDICINE

## 2020-11-24 PROCEDURE — C9113 INJ PANTOPRAZOLE SODIUM, VIA: HCPCS | Performed by: HOSPITALIST

## 2020-11-24 PROCEDURE — 85730 THROMBOPLASTIN TIME PARTIAL: CPT | Performed by: EMERGENCY MEDICINE

## 2020-11-24 PROCEDURE — 93005 ELECTROCARDIOGRAM TRACING: CPT

## 2020-11-24 PROCEDURE — 85379 FIBRIN DEGRADATION QUANT: CPT | Performed by: EMERGENCY MEDICINE

## 2020-11-24 PROCEDURE — 80053 COMPREHEN METABOLIC PANEL: CPT | Performed by: EMERGENCY MEDICINE

## 2020-11-24 PROCEDURE — 85025 COMPLETE CBC W/AUTO DIFF WBC: CPT | Performed by: EMERGENCY MEDICINE

## 2020-11-24 PROCEDURE — 36415 COLL VENOUS BLD VENIPUNCTURE: CPT

## 2020-11-24 PROCEDURE — 93971 EXTREMITY STUDY: CPT | Performed by: EMERGENCY MEDICINE

## 2020-11-24 PROCEDURE — 83690 ASSAY OF LIPASE: CPT | Performed by: EMERGENCY MEDICINE

## 2020-11-24 PROCEDURE — 96372 THER/PROPH/DIAG INJ SC/IM: CPT

## 2020-11-24 PROCEDURE — 74176 CT ABD & PELVIS W/O CONTRAST: CPT | Performed by: EMERGENCY MEDICINE

## 2020-11-24 PROCEDURE — 99285 EMERGENCY DEPT VISIT HI MDM: CPT

## 2020-11-24 PROCEDURE — 85007 BL SMEAR W/DIFF WBC COUNT: CPT | Performed by: EMERGENCY MEDICINE

## 2020-11-24 PROCEDURE — 93010 ELECTROCARDIOGRAM REPORT: CPT

## 2020-11-24 PROCEDURE — 83605 ASSAY OF LACTIC ACID: CPT | Performed by: EMERGENCY MEDICINE

## 2020-11-24 PROCEDURE — 84145 PROCALCITONIN (PCT): CPT | Performed by: EMERGENCY MEDICINE

## 2020-11-24 PROCEDURE — 96375 TX/PRO/DX INJ NEW DRUG ADDON: CPT

## 2020-11-24 PROCEDURE — 87040 BLOOD CULTURE FOR BACTERIA: CPT | Performed by: EMERGENCY MEDICINE

## 2020-11-24 PROCEDURE — 85610 PROTHROMBIN TIME: CPT | Performed by: EMERGENCY MEDICINE

## 2020-11-24 PROCEDURE — 85027 COMPLETE CBC AUTOMATED: CPT | Performed by: EMERGENCY MEDICINE

## 2020-11-24 PROCEDURE — 83880 ASSAY OF NATRIURETIC PEPTIDE: CPT | Performed by: EMERGENCY MEDICINE

## 2020-11-24 PROCEDURE — 96361 HYDRATE IV INFUSION ADD-ON: CPT

## 2020-11-24 RX ORDER — POLYETHYLENE GLYCOL 3350 17 G/17G
17 POWDER, FOR SOLUTION ORAL DAILY PRN
Status: DISCONTINUED | OUTPATIENT
Start: 2020-11-24 | End: 2020-11-27

## 2020-11-24 RX ORDER — PANTOPRAZOLE SODIUM 40 MG/1
40 TABLET, DELAYED RELEASE ORAL
COMMUNITY
End: 2020-12-29

## 2020-11-24 RX ORDER — DOCUSATE SODIUM 100 MG/1
100 CAPSULE, LIQUID FILLED ORAL 2 TIMES DAILY
Status: DISCONTINUED | OUTPATIENT
Start: 2020-11-24 | End: 2020-11-26

## 2020-11-24 RX ORDER — MORPHINE SULFATE 2 MG/ML
2 INJECTION, SOLUTION INTRAMUSCULAR; INTRAVENOUS ONCE
Status: COMPLETED | OUTPATIENT
Start: 2020-11-24 | End: 2020-11-24

## 2020-11-24 RX ORDER — HYDROCODONE BITARTRATE AND ACETAMINOPHEN 5; 325 MG/1; MG/1
1 TABLET ORAL EVERY 6 HOURS PRN
COMMUNITY
End: 2020-12-29

## 2020-11-24 RX ORDER — SODIUM PHOSPHATE, DIBASIC AND SODIUM PHOSPHATE, MONOBASIC 7; 19 G/133ML; G/133ML
1 ENEMA RECTAL ONCE AS NEEDED
Status: DISCONTINUED | OUTPATIENT
Start: 2020-11-24 | End: 2020-11-27

## 2020-11-24 RX ORDER — ENOXAPARIN SODIUM 100 MG/ML
80 INJECTION SUBCUTANEOUS ONCE
Status: COMPLETED | OUTPATIENT
Start: 2020-11-24 | End: 2020-11-24

## 2020-11-24 RX ORDER — METOCLOPRAMIDE HYDROCHLORIDE 5 MG/ML
10 INJECTION INTRAMUSCULAR; INTRAVENOUS EVERY 8 HOURS PRN
Status: DISCONTINUED | OUTPATIENT
Start: 2020-11-24 | End: 2020-11-27

## 2020-11-24 RX ORDER — BISACODYL 10 MG
10 SUPPOSITORY, RECTAL RECTAL
Status: DISCONTINUED | OUTPATIENT
Start: 2020-11-24 | End: 2020-11-27

## 2020-11-24 RX ORDER — MORPHINE SULFATE 4 MG/ML
4 INJECTION, SOLUTION INTRAMUSCULAR; INTRAVENOUS EVERY 2 HOUR PRN
Status: DISCONTINUED | OUTPATIENT
Start: 2020-11-24 | End: 2020-11-27

## 2020-11-24 RX ORDER — ONDANSETRON 2 MG/ML
4 INJECTION INTRAMUSCULAR; INTRAVENOUS EVERY 6 HOURS PRN
Status: DISCONTINUED | OUTPATIENT
Start: 2020-11-24 | End: 2020-11-27

## 2020-11-24 RX ORDER — SUCRALFATE 1 G/1
1 TABLET ORAL
COMMUNITY
End: 2020-12-29

## 2020-11-24 RX ORDER — ENOXAPARIN SODIUM 100 MG/ML
1 INJECTION SUBCUTANEOUS EVERY 12 HOURS SCHEDULED
Status: DISCONTINUED | OUTPATIENT
Start: 2020-11-25 | End: 2020-11-27

## 2020-11-24 RX ORDER — MORPHINE SULFATE 2 MG/ML
1 INJECTION, SOLUTION INTRAMUSCULAR; INTRAVENOUS EVERY 2 HOUR PRN
Status: DISCONTINUED | OUTPATIENT
Start: 2020-11-24 | End: 2020-11-27

## 2020-11-24 RX ORDER — SODIUM CHLORIDE 9 MG/ML
INJECTION, SOLUTION INTRAVENOUS CONTINUOUS
Status: DISCONTINUED | OUTPATIENT
Start: 2020-11-24 | End: 2020-11-26

## 2020-11-24 RX ORDER — MORPHINE SULFATE 2 MG/ML
2 INJECTION, SOLUTION INTRAMUSCULAR; INTRAVENOUS EVERY 2 HOUR PRN
Status: DISCONTINUED | OUTPATIENT
Start: 2020-11-24 | End: 2020-11-27

## 2020-11-24 RX ORDER — ACETAMINOPHEN 325 MG/1
650 TABLET ORAL EVERY 6 HOURS PRN
Status: DISCONTINUED | OUTPATIENT
Start: 2020-11-24 | End: 2020-11-27

## 2020-11-24 RX ORDER — SODIUM CHLORIDE, SODIUM LACTATE, POTASSIUM CHLORIDE, CALCIUM CHLORIDE 600; 310; 30; 20 MG/100ML; MG/100ML; MG/100ML; MG/100ML
INJECTION, SOLUTION INTRAVENOUS ONCE
Status: COMPLETED | OUTPATIENT
Start: 2020-11-24 | End: 2020-11-24

## 2020-11-24 NOTE — ED NOTES
Pt back in room from [address]. Updated on poc. No distress noted. Family at bedside.  Pt denies any needs

## 2020-11-24 NOTE — ED PROVIDER NOTES
Patient Seen in: [facility] Emergency Department      History   Patient presents with:  Abdomen/Flank Pain    Stated Complaint: Recent hospitalization for pancreatitis from tuesday until sunday and still hav*    HPI    This is a 19-year-old female w MANAGEMENT   • COLONOSCOPY  2011= Polyps   • COLONOSCOPY  5/20/14= Diverticulosis    Repeat 2019   • COLONOSCOPY N/A 5/20/2014    Performed by Jose A Alfonso MD at [facility] ENDOSCOPY   • HYSTERECTOMY     • NEEDLE BIOPSY LEFT  6/2012   • OTHER Right     achilles Right lower extremity edema. Chronic per patient. Previous DVT in right leg. Warm with brisk capillary refill.        ED Course     Labs Reviewed   COMP METABOLIC PANEL (14) - Abnormal; Notable for the following components:       Result Value    Glucose Final result                 Please view results for these tests on the individual orders.    URINALYSIS WITH CULTURE REFLEX   URINALYSIS WITH CULTURE REFLEX   RAINBOW DRAW BLUE   RAINBOW DRAW LAVENDER   RAINBOW DRAW LIGHT GREEN   RAINBOW DRAW GOLD   BLO specified.         Medications Prescribed:  Current Discharge Medication List                          Present on Admission  Date Reviewed: 11/24/2020          ICD-10-CM Noted POA    * (Principal) Abdominal pain, generalized R10.84 11/24/2020 Unknown    Acu

## 2020-11-24 NOTE — ED INITIAL ASSESSMENT (HPI)
Pt to Ed from dr Harjit Fernando office with c/o bloating, abdominal pain, lower back pain. Denies n/v/d. Decreased appetite. Small BM's reported. Was discharged [facility] Monday for pancreatitis and irritated gallbladder.

## 2020-11-25 ENCOUNTER — APPOINTMENT (OUTPATIENT)
Dept: ULTRASOUND IMAGING | Facility: HOSPITAL | Age: 80
DRG: 439 | End: 2020-11-25
Attending: INTERNAL MEDICINE
Payer: MEDICARE

## 2020-11-25 PROCEDURE — 80053 COMPREHEN METABOLIC PANEL: CPT | Performed by: HOSPITALIST

## 2020-11-25 PROCEDURE — 85025 COMPLETE CBC W/AUTO DIFF WBC: CPT | Performed by: HOSPITALIST

## 2020-11-25 PROCEDURE — 76705 ECHO EXAM OF ABDOMEN: CPT | Performed by: INTERNAL MEDICINE

## 2020-11-25 PROCEDURE — 76700 US EXAM ABDOM COMPLETE: CPT | Performed by: INTERNAL MEDICINE

## 2020-11-25 PROCEDURE — 87086 URINE CULTURE/COLONY COUNT: CPT | Performed by: HOSPITALIST

## 2020-11-25 PROCEDURE — 82962 GLUCOSE BLOOD TEST: CPT

## 2020-11-25 PROCEDURE — C9113 INJ PANTOPRAZOLE SODIUM, VIA: HCPCS | Performed by: HOSPITALIST

## 2020-11-25 PROCEDURE — 81001 URINALYSIS AUTO W/SCOPE: CPT | Performed by: HOSPITALIST

## 2020-11-25 RX ORDER — DEXTROSE MONOHYDRATE 25 G/50ML
50 INJECTION, SOLUTION INTRAVENOUS
Status: DISCONTINUED | OUTPATIENT
Start: 2020-11-25 | End: 2020-11-27

## 2020-11-25 NOTE — PROGRESS NOTES
11/25/20 1242   Clinical Encounter Type   Visited With Patient and family together   Routine Visit   (Responded to the consult/request)   Episcopalian Encounters   Episcopalian Needs Prayer  (Per request, prayed together. .Our Father. .., promoted a sense of pe

## 2020-11-25 NOTE — PLAN OF CARE
Sepsis BPA fired. HR = 106, temp 99.0, O2 95% on RA. Dr. Anthony Camargo notified. IVF infusing as ordered. Tele monitoring - [facility] aware, will continue to monitor.

## 2020-11-25 NOTE — PROGRESS NOTES
[facility] Hospitalist Team  Progress Note      Lonne Hodgkins [place] Kevinjenaro  1/10/1940    Assessment/Plan:       1) abd distention- likely acute pancreatitis  - soft tissue density extending around head of pancreas to RRP 6.1 X 4.4 cm  - GI consulted, recommend very slow lesions      Data:       Labs:   Recent Labs   Lab 11/24/20  1406 11/25/20  0628   WBC 18.4* 14.6*   HGB 11.2* 9.2*   MCV 86.3 88.4   .0 300.0   BAND 5  --    INR 1.11  --        Recent Labs   Lab 11/24/20  1406 11/25/20 0628    138   K 3.4*

## 2020-11-25 NOTE — CONSULTS
INFECTIOUS DISEASE CONSULT NOTE    Astrid [facility] Patient Status:  Inpatient    1/10/1940 MRN [id number]   [facility] 3SW-A Attending Gypsy Richardson MD   Hosp Day # 1 PCP Dunia Bhagat MANAGEMENT   • COLONOSCOPY  2011= Polyps   • COLONOSCOPY  5/20/14= Diverticulosis    Repeat 2019   • COLONOSCOPY N/A 5/20/2014    Performed by Abigail Aburto MD at [facility] ENDOSCOPY   • HYSTERECTOMY     • NEEDLE BIOPSY LEFT  6/2012   • OTHER Right     achilles (REGLAN) injection 10 mg, 10 mg, Intravenous, Q8H PRN  •  docusate sodium (COLACE) cap 100 mg, 100 mg, Oral, BID  •  PEG 3350 (MIRALAX) powder packet 17 g, 17 g, Oral, Daily PRN  •  magnesium hydroxide (MILK OF MAGNESIA) 400 MG/5ML suspension 30 mL, 30 mL, S2.  Regular rate and rhythm. No murmurs. Abdomen: Soft, distended, mild tenderness  Positive bowel sounds. Musculoskeletal: Full range of motion of all extremities. No arthritis. + edema legs  Integument: No rash. No open wounds.     Laboratory Data: VASCULATURE:  No visible pulmonary arterial thrombus or attenuation. 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 Moderate stenosis at the origin of the celiac axis due to calcified plaque.  L tibial veins, and the contralateral common femoral vein. PATIENT STATED HISTORY: (As transcribed by Technologist)  right leg swelling. FINDINGS:  EXTREMITY EXAMINED:  Right SAPHENOFEMORAL JUNCTION:  No reflux.  THROMBI:  There is partially obstructing a This is somewhat ill-defined but measures approximately 6.1 x 4.4 cm on axial image 28. This measurement may include part of the duodenum.   Evaluation is limited without contrast.  Ill-defined soft tissue density extends along the retroperitoneum caudal to bilateral pleural effusions with atelectasis in lower lobes. 4. Density in gallbladder most likely represents gallbladder sludge.  5. 15 cm low-density lesion in the right kidney is probably a renal cyst. 6. There is diverticulosis of the sigmoid colon with

## 2020-11-25 NOTE — H&P
.  CC: Patient presents with:  Abdomen/Flank Pain       PCP: Debra Lei MD    History of Present Illness: Patient is a 68year old female with PMH sig for DM on metformin, hypothyroidism, h/o RLE DVT years ago who presented from PCP's office wi NEEDLE BIOPSY LEFT  6/2012   • OTHER Right     achilles tendon repair x 2   • OTHER  11/9/2016    ANTERIOR CERVICAL DISCECTOMY AND FUSION OF CERVICAL 4-CERVICAL 5 AND CERVICAL 5- CERVICAL 6 WITH ALLOGRAFT BONE AND MACHINED ALLOY PLATING   • OTHER  3/30/201 CBC/Chem  Recent Labs   Lab 11/24/20  1406   WBC 18.4*   HGB 11.2*   MCV 86.3   .0   BAND 5   INR 1.11       Recent Labs   Lab 11/24/20  1406      K 3.4*   CL 99   CO2 30.0   BUN 9   CREATSERUM 0.67   *   CA 8.9       Recent Labs   La changes in the abdomen involving the pancreas and retroperitoneum is again noted. BONES:  No bony lesion or fracture. OTHER:  Negative. CONCLUSION:   1. No evidence of pulmonary embolus.   2. Small bilateral pleural effusions, right greater geena performed.      Dictated by (CST): Giacomo Tucker MD on 11/24/2020 at 4:59 PM     Finalized by (CST): Giacomo Tucker MD on 11/24/2020 at 5:03 PM       Ct Abdomen+pelvis(cpt=74176)    Result Date: 11/24/2020  PROCEDURE:  CT ABDOMEN+PELVIS (CPT=74176) as described above which is likely related to pancreatitis. Hemorrhagic pancreatitis is not excluded. BOWEL/MESENTERY:  Bowel is displaced by the large renal cyst and also the soft tissue density associated with the pancreatitis.   There is diverticulosis consulted  - IVF  - npo with sips, ice chips okay unless otherwise indicated by GI  - prn iv pain medicine, nausea medicine, bowel regimen  - lfts okay except for mild alk phos elevation although imaging does show density in gallbladder, likely sludge.    -

## 2020-11-25 NOTE — PLAN OF CARE
NURSING ADMISSION NOTE    ED admin for abdominal pain/distention  Patient admitted via stretcher, transfer to bed  Oriented to room. Safety precautions initiated. Bed in low position. Call light in reach.     Discharge paperwork from [address] [place] [address] Rd., [address]

## 2020-11-25 NOTE — CONSULTS
[facility]    Report of Gastroenterology Consultation    Regina Holbrook [facility] Patient Status:  Inpatient    1/10/1940 MRN [id number]   [facility] 3SW-A Attending Theora Master, [address] [facility] Day # 1 PCP Anyi Kraus MD     Da 3/29/2016    Performed by Doug Rain MD at [address]   • COLONOSCOPY  2011= Polyps   • COLONOSCOPY  5/20/14= Diverticulosis    Repeat 2019   • COLONOSCOPY N/A 5/20/2014    Performed by Donny Loera MD at [address] [facility] injection 4 mg, 4 mg, Intravenous, Q6H PRN  •  Metoclopramide HCl (REGLAN) injection 10 mg, 10 mg, Intravenous, Q8H PRN  •  docusate sodium (COLACE) cap 100 mg, 100 mg, Oral, BID  •  PEG 3350 (MIRALAX) powder packet 17 g, 17 g, Oral, Daily PRN  •  magnesiu stones, painful/difficult urination, frequent urinary infections, frequent urination, blood in urine, incontinence, kidney failure, prostate problems. Endocrine: Denies thyroid disease, denies diabetes.   Female complaints: Denies endometriosis, painful me BUN 12 11/25/2020     11/25/2020    K 3.1 11/25/2020     11/25/2020    CO2 31.0 11/25/2020     11/25/2020    CA 8.2 11/25/2020    ALB 1.8 11/25/2020    ALKPHO 140 11/25/2020    BILT 1.0 11/25/2020    TP 5.7 11/25/2020    AST 19 11/25/

## 2020-11-26 ENCOUNTER — APPOINTMENT (OUTPATIENT)
Dept: GENERAL RADIOLOGY | Facility: HOSPITAL | Age: 80
DRG: 439 | End: 2020-11-26
Attending: INTERNAL MEDICINE
Payer: MEDICARE

## 2020-11-26 PROCEDURE — 82962 GLUCOSE BLOOD TEST: CPT

## 2020-11-26 PROCEDURE — 71046 X-RAY EXAM CHEST 2 VIEWS: CPT | Performed by: INTERNAL MEDICINE

## 2020-11-26 PROCEDURE — 85025 COMPLETE CBC W/AUTO DIFF WBC: CPT | Performed by: INTERNAL MEDICINE

## 2020-11-26 PROCEDURE — C9113 INJ PANTOPRAZOLE SODIUM, VIA: HCPCS | Performed by: HOSPITALIST

## 2020-11-26 PROCEDURE — 80053 COMPREHEN METABOLIC PANEL: CPT | Performed by: INTERNAL MEDICINE

## 2020-11-26 RX ORDER — FUROSEMIDE 10 MG/ML
20 INJECTION INTRAMUSCULAR; INTRAVENOUS ONCE
Status: COMPLETED | OUTPATIENT
Start: 2020-11-26 | End: 2020-11-26

## 2020-11-26 RX ORDER — POTASSIUM CHLORIDE 20 MEQ/1
40 TABLET, EXTENDED RELEASE ORAL EVERY 4 HOURS
Status: COMPLETED | OUTPATIENT
Start: 2020-11-26 | End: 2020-11-26

## 2020-11-26 NOTE — PROGRESS NOTES
[facility]  Progress Note    Sahil Melara Patient Status:  Inpatient    1/10/1940 MRN [id number]   [facility] 3SW-A Attending Paola Coates, [address] [facility] Day # 2 PCP Minerva Greer MD     Subjective:  [address]

## 2020-11-26 NOTE — PROGRESS NOTES
[facility] Hospitalist Team  Progress Note      Gilbert Jorge L Roque  1/10/1940    Assessment/Plan:       1) abd distention- likely acute pancreatitis  - soft tissue density extending around head of pancreas to RRP 6.1 X 4.4 cm  - GI consulted, they advanced diet 11/24/20  1406 11/25/20  0628 11/26/20  0953   WBC 18.4* 14.6* 12.3*   HGB 11.2* 9.2* 9.7*   MCV 86.3 88.4 89.0   .0 300.0 367.0   BAND 5  --   --    INR 1.11  --   --        Recent Labs   Lab 11/24/20  1406 11/25/20  0628 11/26/20  0953    13

## 2020-11-26 NOTE — PROGRESS NOTES
This am pt needed 02 at 2l to maintain sats above 90 %, on room air at times she would have sats in the low 80s dr aware and cxr completed, md aware of results and lasix given, pt less sob now after lasix and states feeling better. k 3.1 today and suppleme

## 2020-11-26 NOTE — PLAN OF CARE
Tolerating CLD  +BM     Problem: PAIN - ADULT  Goal: Verbalizes/displays adequate comfort level or patient's stated pain goal  Description: INTERVENTIONS:  - Encourage pt to monitor pain and request assistance  - Assess pain using appropriate pain scale  - appropriate  - Assess patient's ability to be responsible for managing their own health  - Refer to Case Management Department for coordinating discharge planning if the patient needs post-hospital services based on physician/LIP order or complex needs rel

## 2020-11-26 NOTE — PLAN OF CARE
Abdomen rounded, distended, but soft. Patient denies pain with palpation. Hypoactive BS. O2 2L/NC. Denies n/v. VSS stable. Abx given as ordered, tolerated well. BG level WDL, no insulin coverage needed as HS. SCD to LLE.  Fall and safety precautions maintai transportation as appropriate  - Identify discharge learning needs (meds, wound care, etc)  - Arrange for interpreters to assist at discharge as needed  - Consider post-discharge preferences of patient/family/discharge partner  - Complete POLST form as ras

## 2020-11-27 VITALS
WEIGHT: 197 LBS | HEART RATE: 94 BPM | RESPIRATION RATE: 18 BRPM | OXYGEN SATURATION: 95 % | HEIGHT: 60 IN | SYSTOLIC BLOOD PRESSURE: 129 MMHG | DIASTOLIC BLOOD PRESSURE: 51 MMHG | BODY MASS INDEX: 38.68 KG/M2 | TEMPERATURE: 98 F

## 2020-11-27 PROCEDURE — 85025 COMPLETE CBC W/AUTO DIFF WBC: CPT | Performed by: INTERNAL MEDICINE

## 2020-11-27 PROCEDURE — 80053 COMPREHEN METABOLIC PANEL: CPT | Performed by: INTERNAL MEDICINE

## 2020-11-27 PROCEDURE — C9113 INJ PANTOPRAZOLE SODIUM, VIA: HCPCS | Performed by: HOSPITALIST

## 2020-11-27 PROCEDURE — 82962 GLUCOSE BLOOD TEST: CPT

## 2020-11-27 RX ORDER — AMOXICILLIN AND CLAVULANATE POTASSIUM 875; 125 MG/1; MG/1
1 TABLET, FILM COATED ORAL 2 TIMES DAILY
Qty: 14 TABLET | Refills: 0 | Status: SHIPPED | OUTPATIENT
Start: 2020-11-27 | End: 2020-12-04

## 2020-11-27 NOTE — PROGRESS NOTES
[facility]    Progress Note    Carla Melara Patient Status:  Inpatient    1/10/1940 MRN [id number]   [facility] 3SW-A Attending Nayeli Porter DO   Hosp Day # 3 PCP Tamika Castillo MD     Subjective:  [address] Borderline type 2 diabetes mellitus     Hoarseness     Scar of neck     Abdominal pain, generalized     Acute pancreatitis, unspecified complication status, unspecified pancreatitis type           Plan:        Ladan Peralta  11/27/2020  10:25 AM

## 2020-11-27 NOTE — PLAN OF CARE
A&Ox4. Denies pain. Remains on RA. Encouraging use of IS. VSS. Voiding without difficulty. SCD on LLE. Ambulating with SB assist, tolerating well. Zosyn Q 8hrs. Call light within reach.

## 2020-11-27 NOTE — PROGRESS NOTES
INFECTIOUS DISEASE PROGRESS NOTE    Mukul Floresgarcia Roque Patient Status:  Inpatient    1/10/1940 MRN [id number]   [facility] 3SW-A Attending Amelia Gray MD   Hosp Day # 3 PCP [facility] push, 40 mg, Intravenous, Q12H  •  Piperacillin Sod-Tazobactam So (ZOSYN) 3.375 g in dextrose 5 % 100 mL ADD-vantage, 3.375 g, Intravenous, Q8H    Review of Systems:    Completed.  See pertinent positives and negatives as above    Physical Exam:    General: 11/25/20  0915   COLORUR Yellow   CLARITY Hazy*   SPECGRAVITY 1.036*   GLUUR 50 *   BILUR Negative   KETUR 20 *   BLOODURINE Moderate*   PHURINE 5.0   PROUR 30 *   UROBILINOGEN 2.0   NITRITE Negative   LEUUR Negative   WBCUR 5-10*   RBCUR >10*   BACUR None fracture. OTHER:  Negative. CONCLUSION:   1. No evidence of pulmonary embolus. 2. Small bilateral pleural effusions, right greater than left may be due to fluid overload.   3. Marked inflammatory changes in the upper abdomen are again identifi Brittanie Wild MD on 11/24/2020 at 5:03 PM       Ct Abdomen+pelvis(cpt=74176)    Result Date: 11/24/2020  PROCEDURE:  CT ABDOMEN+PELVIS (CPT=74176)  COMPARISON:  None.   INDICATIONS:  Recent hospitalization for pancreatitis from tuesday until sunday and still hav BOWEL/MESENTERY:  Bowel is displaced by the large renal cyst and also the soft tissue density associated with the pancreatitis. There is diverticulosis of the colon without CT evidence of diverticulitis. There is no evidence of bowel obstruction.  Trav Mathews to abd distention as well- better now  5. RLE DVT    PLAN:  - cont empiric zosyn while here  - management of pancreatitis per GI  - follow BCx  - follow temps  - monitor wbc  - follow abd exam  Case and plan d/w pt.  She seems better, if cont to improve cou

## 2020-11-27 NOTE — DISCHARGE SUMMARY
General Medicine Discharge Summary     Patient ID:  Trini Melara  68year old  1/10/1940    Admit date: 11/24/2020    Discharge date and time: 11/27/20    Attending Physician: Clarisa Ernandez better today.        Hospital Course:      1) abd distention- likely acute pancreatitis  - soft tissue density extending around head of pancreas to RRP 6.1 X 4.4 cm  - GI consulted, they advanced diet  - IVF ordered  - prn iv pain medicine, nausea medicine, Prescription details   HYDROcodone-acetaminophen 5-325 MG Tabs  Commonly known as: NORCO      Take 1 tablet by mouth every 6 (six) hours as needed for Pain.    Refills: 0     metFORMIN HCl 500 MG Tabs  Commonly known as: GLUCOPHAGE      Take 1 tablet (500 m

## 2020-11-27 NOTE — PLAN OF CARE
Pt denies pain. Abdo remains distended, BM today. Taking small amt meals. Receiving IV Zosyn as ordered. Seen by hospitalist, ID, and GI. OK for dc to home today on oral antibiotic. Written and verbal dc instructions given to pt and spouse.   Anthony Morgan

## 2020-12-29 ENCOUNTER — HOSPITAL ENCOUNTER (OUTPATIENT)
Dept: CT IMAGING | Age: 80
Discharge: HOME | End: 2020-12-29
Attending: INTERNAL MEDICINE
Payer: MEDICARE

## 2020-12-29 DIAGNOSIS — K85.00 IDIOPATHIC ACUTE PANCREATITIS WITHOUT INFECTION OR NECROSIS: ICD-10-CM

## 2020-12-29 LAB — CREAT BLD-MCNC: 0.8 MG/DL (ref 0.55–1.02)

## 2020-12-29 PROCEDURE — 74170 CT ABD WO CNTRST FLWD CNTRST: CPT | Performed by: INTERNAL MEDICINE

## 2020-12-29 PROCEDURE — 82565 ASSAY OF CREATININE: CPT

## 2021-01-06 ENCOUNTER — APPOINTMENT (OUTPATIENT)
Dept: MRI IMAGING | Facility: HOSPITAL | Age: 81
DRG: 444 | End: 2021-01-06
Attending: INTERNAL MEDICINE
Payer: MEDICARE

## 2021-01-06 ENCOUNTER — HOSPITAL ENCOUNTER (INPATIENT)
Facility: HOSPITAL | Age: 81
LOS: 4 days | Discharge: HOME | DRG: 444 | End: 2021-01-10
Attending: EMERGENCY MEDICINE | Admitting: INTERNAL MEDICINE
Payer: MEDICARE

## 2021-01-06 DIAGNOSIS — E80.6 HYPERBILIRUBINEMIA: Primary | ICD-10-CM

## 2021-01-06 LAB
ALBUMIN SERPL-MCNC: 2.9 G/DL (ref 3.4–5)
ALBUMIN/GLOB SERPL: 0.7 {RATIO} (ref 1–2)
ALP LIVER SERPL-CCNC: 387 U/L
ALT SERPL-CCNC: 86 U/L
ANION GAP SERPL CALC-SCNC: 10 MMOL/L (ref 0–18)
AST SERPL-CCNC: 75 U/L (ref 15–37)
BASOPHILS # BLD AUTO: 0.1 X10(3) UL (ref 0–0.2)
BASOPHILS NFR BLD AUTO: 1.9 %
BILIRUB SERPL-MCNC: 11.7 MG/DL (ref 0.1–2)
BUN BLD-MCNC: 18 MG/DL (ref 7–18)
BUN/CREAT SERPL: 20 (ref 10–20)
CALCIUM BLD-MCNC: 9.8 MG/DL (ref 8.5–10.1)
CHLORIDE SERPL-SCNC: 100 MMOL/L (ref 98–112)
CO2 SERPL-SCNC: 25 MMOL/L (ref 21–32)
CREAT BLD-MCNC: 0.9 MG/DL
DEPRECATED RDW RBC AUTO: 59.3 FL (ref 35.1–46.3)
EOSINOPHIL # BLD AUTO: 0.14 X10(3) UL (ref 0–0.7)
EOSINOPHIL NFR BLD AUTO: 2.7 %
ERYTHROCYTE [DISTWIDTH] IN BLOOD BY AUTOMATED COUNT: 18.7 % (ref 11–15)
GLOBULIN PLAS-MCNC: 4.1 G/DL (ref 2.8–4.4)
GLUCOSE BLD-MCNC: 122 MG/DL (ref 70–99)
GLUCOSE UR STRIP.AUTO-MCNC: 50 MG/DL
HCT VFR BLD AUTO: 34.3 %
HGB BLD-MCNC: 11.5 G/DL
IMM GRANULOCYTES # BLD AUTO: 0.03 X10(3) UL (ref 0–1)
IMM GRANULOCYTES NFR BLD: 0.6 %
KETONES UR STRIP.AUTO-MCNC: NEGATIVE MG/DL
LIPASE SERPL-CCNC: 143 U/L (ref 73–393)
LYMPHOCYTES # BLD AUTO: 0.73 X10(3) UL (ref 1–4)
LYMPHOCYTES NFR BLD AUTO: 14.2 %
M PROTEIN MFR SERPL ELPH: 7 G/DL (ref 6.4–8.2)
MCH RBC QN AUTO: 28.9 PG (ref 26–34)
MCHC RBC AUTO-ENTMCNC: 33.5 G/DL (ref 31–37)
MCV RBC AUTO: 86.2 FL
MONOCYTES # BLD AUTO: 0.44 X10(3) UL (ref 0.1–1)
MONOCYTES NFR BLD AUTO: 8.6 %
NEUTROPHILS # BLD AUTO: 3.7 X10 (3) UL (ref 1.5–7.7)
NEUTROPHILS # BLD AUTO: 3.7 X10(3) UL (ref 1.5–7.7)
NEUTROPHILS NFR BLD AUTO: 72 %
NITRITE UR QL STRIP.AUTO: NEGATIVE
OSMOLALITY SERPL CALC.SUM OF ELEC: 283 MOSM/KG (ref 275–295)
PH UR STRIP.AUTO: 5 [PH] (ref 4.5–8)
PLATELET # BLD AUTO: 288 10(3)UL (ref 150–450)
POTASSIUM SERPL-SCNC: 3.6 MMOL/L (ref 3.5–5.1)
PROT UR STRIP.AUTO-MCNC: 30 MG/DL
RBC # BLD AUTO: 3.98 X10(6)UL
SARS-COV-2 RNA RESP QL NAA+PROBE: NOT DETECTED
SODIUM SERPL-SCNC: 135 MMOL/L (ref 136–145)
SP GR UR STRIP.AUTO: 1.02 (ref 1–1.03)
UROBILINOGEN UR STRIP.AUTO-MCNC: 4 MG/DL
WBC # BLD AUTO: 5.1 X10(3) UL (ref 4–11)

## 2021-01-06 PROCEDURE — 99285 EMERGENCY DEPT VISIT HI MDM: CPT | Performed by: EMERGENCY MEDICINE

## 2021-01-06 PROCEDURE — 81001 URINALYSIS AUTO W/SCOPE: CPT | Performed by: EMERGENCY MEDICINE

## 2021-01-06 PROCEDURE — 36415 COLL VENOUS BLD VENIPUNCTURE: CPT | Performed by: EMERGENCY MEDICINE

## 2021-01-06 PROCEDURE — 74183 MRI ABD W/O CNTR FLWD CNTR: CPT | Performed by: INTERNAL MEDICINE

## 2021-01-06 PROCEDURE — 87086 URINE CULTURE/COLONY COUNT: CPT | Performed by: EMERGENCY MEDICINE

## 2021-01-06 PROCEDURE — 76376 3D RENDER W/INTRP POSTPROCES: CPT | Performed by: INTERNAL MEDICINE

## 2021-01-06 PROCEDURE — A9575 INJ GADOTERATE MEGLUMI 0.1ML: HCPCS | Performed by: INTERNAL MEDICINE

## 2021-01-06 PROCEDURE — 80053 COMPREHEN METABOLIC PANEL: CPT | Performed by: EMERGENCY MEDICINE

## 2021-01-06 PROCEDURE — 85025 COMPLETE CBC W/AUTO DIFF WBC: CPT | Performed by: EMERGENCY MEDICINE

## 2021-01-06 PROCEDURE — 83690 ASSAY OF LIPASE: CPT | Performed by: EMERGENCY MEDICINE

## 2021-01-06 RX ORDER — SODIUM PHOSPHATE, DIBASIC AND SODIUM PHOSPHATE, MONOBASIC 7; 19 G/133ML; G/133ML
1 ENEMA RECTAL ONCE AS NEEDED
Status: DISCONTINUED | OUTPATIENT
Start: 2021-01-06 | End: 2021-01-10

## 2021-01-06 RX ORDER — ONDANSETRON 2 MG/ML
4 INJECTION INTRAMUSCULAR; INTRAVENOUS EVERY 6 HOURS PRN
Status: DISCONTINUED | OUTPATIENT
Start: 2021-01-06 | End: 2021-01-10

## 2021-01-06 RX ORDER — POLYETHYLENE GLYCOL 3350 17 G/17G
17 POWDER, FOR SOLUTION ORAL DAILY PRN
Status: DISCONTINUED | OUTPATIENT
Start: 2021-01-06 | End: 2021-01-10

## 2021-01-06 RX ORDER — DEXTROSE MONOHYDRATE 25 G/50ML
50 INJECTION, SOLUTION INTRAVENOUS
Status: DISCONTINUED | OUTPATIENT
Start: 2021-01-06 | End: 2021-01-07 | Stop reason: HOSPADM

## 2021-01-06 RX ORDER — MELATONIN
3 NIGHTLY PRN
Status: DISCONTINUED | OUTPATIENT
Start: 2021-01-06 | End: 2021-01-10

## 2021-01-06 RX ORDER — LEVOFLOXACIN 5 MG/ML
750 INJECTION, SOLUTION INTRAVENOUS EVERY 24 HOURS
Status: DISCONTINUED | OUTPATIENT
Start: 2021-01-06 | End: 2021-01-10

## 2021-01-06 RX ORDER — SODIUM CHLORIDE, SODIUM LACTATE, POTASSIUM CHLORIDE, CALCIUM CHLORIDE 600; 310; 30; 20 MG/100ML; MG/100ML; MG/100ML; MG/100ML
INJECTION, SOLUTION INTRAVENOUS CONTINUOUS
Status: DISCONTINUED | OUTPATIENT
Start: 2021-01-06 | End: 2021-01-10

## 2021-01-06 RX ORDER — BISACODYL 10 MG
10 SUPPOSITORY, RECTAL RECTAL
Status: DISCONTINUED | OUTPATIENT
Start: 2021-01-06 | End: 2021-01-10

## 2021-01-06 RX ORDER — SODIUM CHLORIDE 9 MG/ML
INJECTION, SOLUTION INTRAVENOUS CONTINUOUS
Status: DISCONTINUED | OUTPATIENT
Start: 2021-01-06 | End: 2021-01-10

## 2021-01-06 RX ORDER — METOCLOPRAMIDE HYDROCHLORIDE 5 MG/ML
10 INJECTION INTRAMUSCULAR; INTRAVENOUS EVERY 8 HOURS PRN
Status: DISCONTINUED | OUTPATIENT
Start: 2021-01-06 | End: 2021-01-10

## 2021-01-06 NOTE — H&P
[facility] Hospitalist History and Physical      Patient presents with:  Jaundice       PCP: Cesilia Leon MD      History of Present Illness: Patient is a 68year old female with PMH sig for DVT, IFG, recent pancreatitis and psuedocyst who is admitted w at Cervical 6 level, Dr. Sugar Lacey   • [address] [facility]  1/2017    AT C6        ALL:    Adhesive Tape           RASH, ITCHING, FEVER       •  CALCIUM OR, Take 1 tablet by mouth daily.       •  Omega-3 Fatty Acids (FISH OIL) 1000 MG Oral Capsule Component Value Date    WBC 5.1 01/06/2021    HGB 11.5 01/06/2021    HCT 34.3 01/06/2021    .0 01/06/2021    CREATSERUM 0.90 01/06/2021    BUN 18 01/06/2021     01/06/2021    K 3.6 01/06/2021     01/06/2021    CO2 25.0 01/06/2021    GL pseudocyst formation and chronic inflammatory changes. Findings are consistent with extra hepatic biliary obstruction which is moderate to high-grade. PANCREAS:  The pancreas demonstrates numerous organized fluid collections consistent with pseudocysts. obstruction, or bowel wall thickening. ABDOMINAL WALL:  No mass or hernia. BONES:  No bony lesion or fracture. OTHER:  Negative.              CONCLUSION:  Extensive pseudocyst formation with the largest pseudocyst posterior to the pancreatic head and un Hospitalist  [phone]

## 2021-01-06 NOTE — CONSULTS
[facility] Pharmacy Note:  Renal Adjustment for levofloxacin [facility])    Paxton Akers is a 68year old patient who has been prescribed levofloxacin (LEVAQUIN) 500 mg every 24 hrs. CrCl is estimated creatinine clearance is 63.4 mL/min (based on SCr of 0.

## 2021-01-06 NOTE — ED PROVIDER NOTES
Patient Seen in: [facility] Emergency Department      History   Patient presents with:  Jaundice    Stated Complaint: Pale/Jaundice on arrival. No abd pain. Sts urine is dark.      HPI/Subjective:   HPI    The patient is a 42-year-old female with rec N/A 3/29/2016    Performed by Leandro Jose MD at [address] [facility]   • COLONOSCOPY  2011= Polyps   • COLONOSCOPY  5/20/14= Diverticulosis    Repeat 2019   • COLONOSCOPY N/A 5/20/2014    Performed by Isaias Gross MD at [facility] ENDOSCOPY   • HY normal and symmetric motor strength and sensation proximally and distally throughout all 4 extremities. HEENT: No lymphadenopathy. Oropharynx nml. Mucus membranes moist.   Supple neck without any meningismus or rigidity.    Cardiovascular: Regular rhythm LIPASE - Normal   RAPID SARS-COV-2 BY PCR - Normal   CBC WITH DIFFERENTIAL WITH PLATELET    Narrative: The following orders were created for panel order CBC WITH DIFFERENTIAL WITH PLATELET.   Procedure                               Abnormality

## 2021-01-07 ENCOUNTER — ANESTHESIA EVENT (OUTPATIENT)
Dept: ENDOSCOPY | Facility: HOSPITAL | Age: 81
DRG: 444 | End: 2021-01-07
Payer: MEDICARE

## 2021-01-07 ENCOUNTER — APPOINTMENT (OUTPATIENT)
Dept: GENERAL RADIOLOGY | Facility: HOSPITAL | Age: 81
DRG: 444 | End: 2021-01-07
Attending: INTERNAL MEDICINE
Payer: MEDICARE

## 2021-01-07 ENCOUNTER — ANESTHESIA (OUTPATIENT)
Dept: ENDOSCOPY | Facility: HOSPITAL | Age: 81
DRG: 444 | End: 2021-01-07
Payer: MEDICARE

## 2021-01-07 LAB
ALBUMIN SERPL-MCNC: 2.5 G/DL (ref 3.4–5)
ALP LIVER SERPL-CCNC: 326 U/L
ALT SERPL-CCNC: 72 U/L
ANION GAP SERPL CALC-SCNC: 8 MMOL/L (ref 0–18)
AST SERPL-CCNC: 65 U/L (ref 15–37)
BASOPHILS # BLD AUTO: 0.06 X10(3) UL (ref 0–0.2)
BASOPHILS NFR BLD AUTO: 1.4 %
BILIRUB DIRECT SERPL-MCNC: 8.8 MG/DL (ref 0–0.2)
BILIRUB SERPL-MCNC: 11.2 MG/DL (ref 0.1–2)
BUN BLD-MCNC: 15 MG/DL (ref 7–18)
BUN/CREAT SERPL: 23.4 (ref 10–20)
CALCIUM BLD-MCNC: 8.8 MG/DL (ref 8.5–10.1)
CHLORIDE SERPL-SCNC: 105 MMOL/L (ref 98–112)
CO2 SERPL-SCNC: 24 MMOL/L (ref 21–32)
CREAT BLD-MCNC: 0.64 MG/DL
DEPRECATED RDW RBC AUTO: 60.3 FL (ref 35.1–46.3)
EOSINOPHIL # BLD AUTO: 0.15 X10(3) UL (ref 0–0.7)
EOSINOPHIL NFR BLD AUTO: 3.4 %
ERYTHROCYTE [DISTWIDTH] IN BLOOD BY AUTOMATED COUNT: 18.7 % (ref 11–15)
GLUCOSE BLD-MCNC: 111 MG/DL (ref 70–99)
HCT VFR BLD AUTO: 31.8 %
HGB BLD-MCNC: 10.4 G/DL
IMM GRANULOCYTES # BLD AUTO: 0.02 X10(3) UL (ref 0–1)
IMM GRANULOCYTES NFR BLD: 0.5 %
LYMPHOCYTES # BLD AUTO: 0.62 X10(3) UL (ref 1–4)
LYMPHOCYTES NFR BLD AUTO: 14 %
M PROTEIN MFR SERPL ELPH: 6 G/DL (ref 6.4–8.2)
MCH RBC QN AUTO: 28.9 PG (ref 26–34)
MCHC RBC AUTO-ENTMCNC: 32.7 G/DL (ref 31–37)
MCV RBC AUTO: 88.3 FL
MONOCYTES # BLD AUTO: 0.51 X10(3) UL (ref 0.1–1)
MONOCYTES NFR BLD AUTO: 11.5 %
NEUTROPHILS # BLD AUTO: 3.08 X10 (3) UL (ref 1.5–7.7)
NEUTROPHILS # BLD AUTO: 3.08 X10(3) UL (ref 1.5–7.7)
NEUTROPHILS NFR BLD AUTO: 69.2 %
OSMOLALITY SERPL CALC.SUM OF ELEC: 286 MOSM/KG (ref 275–295)
PLATELET # BLD AUTO: 265 10(3)UL (ref 150–450)
POTASSIUM SERPL-SCNC: 3.3 MMOL/L (ref 3.5–5.1)
RBC # BLD AUTO: 3.6 X10(6)UL
SODIUM SERPL-SCNC: 137 MMOL/L (ref 136–145)
WBC # BLD AUTO: 4.4 X10(3) UL (ref 4–11)

## 2021-01-07 PROCEDURE — BF10YZZ FLUOROSCOPY OF BILE DUCTS USING OTHER CONTRAST: ICD-10-PCS | Performed by: INTERNAL MEDICINE

## 2021-01-07 PROCEDURE — 88173 CYTOPATH EVAL FNA REPORT: CPT | Performed by: INTERNAL MEDICINE

## 2021-01-07 PROCEDURE — 0FJD8ZZ INSPECTION OF PANCREATIC DUCT, VIA NATURAL OR ARTIFICIAL OPENING ENDOSCOPIC: ICD-10-PCS | Performed by: INTERNAL MEDICINE

## 2021-01-07 PROCEDURE — 88172 CYTP DX EVAL FNA 1ST EA SITE: CPT | Performed by: INTERNAL MEDICINE

## 2021-01-07 PROCEDURE — 88305 TISSUE EXAM BY PATHOLOGIST: CPT | Performed by: INTERNAL MEDICINE

## 2021-01-07 PROCEDURE — 0F798DZ DILATION OF COMMON BILE DUCT WITH INTRALUMINAL DEVICE, VIA NATURAL OR ARTIFICIAL OPENING ENDOSCOPIC: ICD-10-PCS | Performed by: INTERNAL MEDICINE

## 2021-01-07 PROCEDURE — 0FD98ZX EXTRACTION OF COMMON BILE DUCT, VIA NATURAL OR ARTIFICIAL OPENING ENDOSCOPIC, DIAGNOSTIC: ICD-10-PCS | Performed by: INTERNAL MEDICINE

## 2021-01-07 PROCEDURE — 88104 CYTOPATH FL NONGYN SMEARS: CPT | Performed by: INTERNAL MEDICINE

## 2021-01-07 PROCEDURE — 80076 HEPATIC FUNCTION PANEL: CPT | Performed by: HOSPITALIST

## 2021-01-07 PROCEDURE — 88182 CELL MARKER STUDY: CPT | Performed by: HOSPITALIST

## 2021-01-07 PROCEDURE — 74328 X-RAY BILE DUCT ENDOSCOPY: CPT | Performed by: INTERNAL MEDICINE

## 2021-01-07 PROCEDURE — 0FJB8ZZ INSPECTION OF HEPATOBILIARY DUCT, VIA NATURAL OR ARTIFICIAL OPENING ENDOSCOPIC: ICD-10-PCS | Performed by: INTERNAL MEDICINE

## 2021-01-07 PROCEDURE — 85025 COMPLETE CBC W/AUTO DIFF WBC: CPT | Performed by: INTERNAL MEDICINE

## 2021-01-07 PROCEDURE — 80048 BASIC METABOLIC PNL TOTAL CA: CPT | Performed by: INTERNAL MEDICINE

## 2021-01-07 PROCEDURE — 88377 M/PHMTRC ALYS ISHQUANT/SEMIQ: CPT | Performed by: HOSPITALIST

## 2021-01-07 PROCEDURE — 0F9G8ZX DRAINAGE OF PANCREAS, VIA NATURAL OR ARTIFICIAL OPENING ENDOSCOPIC, DIAGNOSTIC: ICD-10-PCS | Performed by: INTERNAL MEDICINE

## 2021-01-07 PROCEDURE — 0FJG8ZZ INSPECTION OF PANCREAS, VIA NATURAL OR ARTIFICIAL OPENING ENDOSCOPIC: ICD-10-PCS | Performed by: INTERNAL MEDICINE

## 2021-01-07 DEVICE — COTTON-LEUNG BILIARY STENT
Type: IMPLANTABLE DEVICE | Status: FUNCTIONAL
Brand: COTTON-LEUNG

## 2021-01-07 RX ORDER — LIDOCAINE HYDROCHLORIDE 10 MG/ML
INJECTION, SOLUTION EPIDURAL; INFILTRATION; INTRACAUDAL; PERINEURAL AS NEEDED
Status: DISCONTINUED | OUTPATIENT
Start: 2021-01-07 | End: 2021-01-07 | Stop reason: SURG

## 2021-01-07 RX ADMIN — LIDOCAINE HYDROCHLORIDE 10 MG: 10 INJECTION, SOLUTION EPIDURAL; INFILTRATION; INTRACAUDAL; PERINEURAL at 16:15:00

## 2021-01-07 NOTE — PROGRESS NOTES
[facility] Hospitalist Progress Note     PCP: Di Cunningham MD    CC:  Follow up    SUBJECTIVE:  Sitting up in bed, awaiting EUS/ERCP. Having white stools. Urinating ok.  No other complaints    OBJECTIVE:  Temp:  [97.9 °F (36.6 °C)-98.2 °F (36.8 °C)] 98 hyperbilirubinemia, transaminitis secondary to  **severe stenosis with obstruction in proximal CBD  -GI on consult, appreciate  -IV abx  -plan for EUS/ERCP today    # DVT, RLE diagnosed 11/2020  - cont eliquis if ok w GI.  Monitor Hb    **subacute anemia- s

## 2021-01-07 NOTE — PLAN OF CARE
Assumed patient care at 1. Patient alert and oriented x4. Vital signs stable. Afebrile. Room air, O2 sats 94-96% No SOB. No chest pain. Noted jaundice. No complaints of abdominal pain so far. No nausea and vomiting. Patient noted with dark urine.  Vincenzoene Pump

## 2021-01-07 NOTE — CONSULTS
[facility]    Report of Gastroenterology Consultation    Jace Warner [facility] Patient Status:  Inpatient    1/10/1940 MRN [id number]   [facility] 4NW-A Attending Jed Dominguez MD   Hosp Day # 0 PCP Alistair Reyes MD PLATING   • OTHER  3/30/2017    Anterior cervical wound exploration for redirection of the screws at Cervical 6 level, Dr. Ana Dumont   • [address] [facility]  1/2017    AT C6     Family History   Problem Relation Age of Onset   • Cancer Brother blood in stools, abdominal distention, jaundice, flatulence, vomiting blood, bloating, hernia, laxative use, food/milk intolerance, pain with bowel movement, hemorrhoids.   General: Denies fatigue, chills/fever, night sweats, weight loss, loss of appetite, sores, bad breath or bad taste in mouth, hearing loss. Physical Exam:    Blood pressure 123/48, pulse 98, temperature 98 °F (36.7 °C), temperature source Oral, resp.  rate 18, height 4' 11\" (1.499 m), weight 172 lb (78 kg), SpO2 96 %, not currently breast

## 2021-01-07 NOTE — ANESTHESIA POSTPROCEDURE EVALUATION
[address] Patient Status:  Inpatient   Age/Gender 68year old female MRN [id number]   Location [address] [facility]. Attending Maxime Olsen, *   Hosp Day # 1 PCP Nayana Laguerre MD       Anesthesia Post-op N

## 2021-01-07 NOTE — H&P
History & Physical Examination    Patient Name: Wayne Encarnacion  MRN: [id number]  CSN: [id number]  YOB: 1940    Diagnosis: Hyperbilirubinemia [E80.6]      Present Illness:  Wayne Encarnacion is a 68year old female is here Hyperbilirubin 8 tablet, 8 tablet, Oral, Q15 Min PRN    •  lactated ringers infusion, , Intravenous, Continuous        Allergies:   Adhesive Tape           RASH, ITCHING, FEVER    Past Surgical History:   Procedure Laterality Date   • ANTERIOR CERVICAL FUSION BG & INST 1 UROGENITAL [ ] [ ]    EXTREMITIES [ ] [ ]    OTHER        [ x ] I have discussed the risks and benefits and alternatives with the patient/family. They understand and agree to proceed with plan of care.   [ x ] I have reviewed the History and Physical don

## 2021-01-07 NOTE — ANESTHESIA PREPROCEDURE EVALUATION
PRE-OP EVALUATION    Patient Name: Mario Almaraz    Pre-op Diagnosis: INPT    Procedure(s):  ENDOSCOPIC RETROGRADE CHOLANGIOPANCREATOGRAPHY, ENDOSCOPIC ULTRASOUND      Surgeon(s) and Role:     * Rajesh Rahman MD - Primary    Pre-op vitals revie 1000 MG Oral Capsule Delayed Release, Take 1 capsule by mouth 3 (three) times a week.  , Disp: , Rfl: , Past Month at Unknown time    •  Multiple Vitamin (TAB-A-REENA) Oral Tab, Take 1 tablet by mouth daily. , Disp: , Rfl: , 1/6/2021 at Unknown time        A CERVICAL DISCECTOMY AND FUSION OF CERVICAL 4-CERVICAL 5 AND CERVICAL 5- CERVICAL 6 WITH ALLOGRAFT BONE AND MACHINED ALLOY PLATING   • OTHER  3/30/2017    Anterior cervical wound exploration for redirection of the screws at Cervical 6 level, Dr. Thor España   • S

## 2021-01-07 NOTE — OPERATIVE REPORT
[facility] OPERATIVE REPORT   PATIENT NAME: Zuhiar Darby  MRN: [id number]  DATE OF OPERATION: 1/7/2021  PREOPERATIVE DIAGNOSIS: biliary obstruction; acute pancreatitis  POSTOPERATIVE DIAGNOSIS:    1. EUS    - biliary obstruction due to large 2. They were without any evidence of chronic pancreatitis, neoplasms, or cysts. Pancreatic duct was normal in caliber throughout. Pancreatic duct was seen and measured 1.1 mm in the neck and was seen going toward the head of pancreas.   The pancreatic tail was brushed with cytology brush. A 10 FR x 10 cm biliary stricture was placed across the stricture with proximal end noted to be above the stricture. Scope was withdrawn from the patient and patient tolerated the procedure well. FINDINGS   1.  High grade

## 2021-01-08 LAB
ALBUMIN SERPL-MCNC: 2.5 G/DL (ref 3.4–5)
ALBUMIN/GLOB SERPL: 0.7 {RATIO} (ref 1–2)
ALP LIVER SERPL-CCNC: 332 U/L
ALT SERPL-CCNC: 65 U/L
ANION GAP SERPL CALC-SCNC: 10 MMOL/L (ref 0–18)
AST SERPL-CCNC: 73 U/L (ref 15–37)
BILIRUB SERPL-MCNC: 12.2 MG/DL (ref 0.1–2)
BUN BLD-MCNC: 12 MG/DL (ref 7–18)
BUN/CREAT SERPL: 16.4 (ref 10–20)
CALCIUM BLD-MCNC: 8.5 MG/DL (ref 8.5–10.1)
CHLORIDE SERPL-SCNC: 106 MMOL/L (ref 98–112)
CO2 SERPL-SCNC: 21 MMOL/L (ref 21–32)
CREAT BLD-MCNC: 0.73 MG/DL
DEPRECATED RDW RBC AUTO: 61.4 FL (ref 35.1–46.3)
ERYTHROCYTE [DISTWIDTH] IN BLOOD BY AUTOMATED COUNT: 19.3 % (ref 11–15)
GLOBULIN PLAS-MCNC: 3.6 G/DL (ref 2.8–4.4)
GLUCOSE BLD-MCNC: 127 MG/DL (ref 70–99)
HAV IGM SER QL: 1.6 MG/DL (ref 1.6–2.6)
HCT VFR BLD AUTO: 33.2 %
HGB BLD-MCNC: 11 G/DL
M PROTEIN MFR SERPL ELPH: 6.1 G/DL (ref 6.4–8.2)
MCH RBC QN AUTO: 28.9 PG (ref 26–34)
MCHC RBC AUTO-ENTMCNC: 33.1 G/DL (ref 31–37)
MCV RBC AUTO: 87.4 FL
OSMOLALITY SERPL CALC.SUM OF ELEC: 285 MOSM/KG (ref 275–295)
PLATELET # BLD AUTO: 251 10(3)UL (ref 150–450)
POTASSIUM SERPL-SCNC: 3.3 MMOL/L (ref 3.5–5.1)
RBC # BLD AUTO: 3.8 X10(6)UL
SODIUM SERPL-SCNC: 137 MMOL/L (ref 136–145)
WBC # BLD AUTO: 7.8 X10(3) UL (ref 4–11)

## 2021-01-08 PROCEDURE — 85027 COMPLETE CBC AUTOMATED: CPT | Performed by: HOSPITALIST

## 2021-01-08 PROCEDURE — 83735 ASSAY OF MAGNESIUM: CPT | Performed by: HOSPITALIST

## 2021-01-08 PROCEDURE — 80053 COMPREHEN METABOLIC PANEL: CPT | Performed by: HOSPITALIST

## 2021-01-08 RX ORDER — CALCIUM CARBONATE 200(500)MG
500 TABLET,CHEWABLE ORAL 2 TIMES DAILY PRN
Status: DISCONTINUED | OUTPATIENT
Start: 2021-01-08 | End: 2021-01-10

## 2021-01-08 RX ORDER — TRAMADOL HYDROCHLORIDE 50 MG/1
50 TABLET ORAL EVERY 6 HOURS PRN
Status: DISCONTINUED | OUTPATIENT
Start: 2021-01-08 | End: 2021-01-10

## 2021-01-08 NOTE — PROGRESS NOTES
[facility] Hospitalist Progress Note     PCP: Di Cunningham MD    CC:  Follow up    SUBJECTIVE:  Pt had eus/ercp yesterday with stent placement.  Had dry heaves this AM and felt burning up throat- says epigastrium, lower chest with soreness for a few mi Metoclopramide HCl, melatonin       Assessment/Plan:     Principal Problem:    Hyperbilirubinemia      # jaundice with hyperbilirubinemia, transaminitis secondary to  **severe stenosis with obstruction in proximal CBD s/p stent  -GI on consult, appreciate

## 2021-01-08 NOTE — PROGRESS NOTES
Back from eus and ercp w/ biliary brushing and  Stent placement, Awake and alert,v/s taken,Denies any pain. On IV levaquin, assisted needs.

## 2021-01-08 NOTE — PLAN OF CARE
Patient A&O x 4.  VS stable, room air and afebrile. No complaints of pain. Intermittent nausea, patient declining antiemetics. IVF per [facility]. Remains jaundice. ERCP/EUS completed today, no post-op complications.   Paged GI r/t resuming Eliquis, plan to r

## 2021-01-08 NOTE — PROGRESS NOTES
[facility]    Progress Note    Carole Melara Patient Status:  Inpatient    1/10/1940 MRN [id number]   [facility] 4NW-A Attending Tutu Monge, *   Hosp Day # 2 PCP Di Cunningham MD     Subjective:  Carole Ledezma Obesity     Acute strain of neck muscle, subsequent encounter     Cervical stenosis of spinal canal     DDD (degenerative disc disease), cervical     Spondylosis of cervical region without myelopathy or radiculopathy     Foraminal stenosis of cervical manisha

## 2021-01-09 LAB
ALBUMIN SERPL-MCNC: 2.4 G/DL (ref 3.4–5)
ALBUMIN/GLOB SERPL: 0.7 {RATIO} (ref 1–2)
ALP LIVER SERPL-CCNC: 297 U/L
ALT SERPL-CCNC: 55 U/L
ANION GAP SERPL CALC-SCNC: 5 MMOL/L (ref 0–18)
APTT PPP: 37.1 SECONDS (ref 25.4–36.1)
APTT PPP: 96.9 SECONDS (ref 25.4–36.1)
AST SERPL-CCNC: 62 U/L (ref 15–37)
BILIRUB SERPL-MCNC: 12.9 MG/DL (ref 0.1–2)
BUN BLD-MCNC: 7 MG/DL (ref 7–18)
BUN/CREAT SERPL: 9.9 (ref 10–20)
CALCIUM BLD-MCNC: 8.3 MG/DL (ref 8.5–10.1)
CHLORIDE SERPL-SCNC: 104 MMOL/L (ref 98–112)
CO2 SERPL-SCNC: 26 MMOL/L (ref 21–32)
CREAT BLD-MCNC: 0.71 MG/DL
GLOBULIN PLAS-MCNC: 3.4 G/DL (ref 2.8–4.4)
GLUCOSE BLD-MCNC: 97 MG/DL (ref 70–99)
HAV IGM SER QL: 1.4 MG/DL (ref 1.6–2.6)
M PROTEIN MFR SERPL ELPH: 5.8 G/DL (ref 6.4–8.2)
OSMOLALITY SERPL CALC.SUM OF ELEC: 278 MOSM/KG (ref 275–295)
POTASSIUM SERPL-SCNC: 2.9 MMOL/L (ref 3.5–5.1)
POTASSIUM SERPL-SCNC: 3.9 MMOL/L (ref 3.5–5.1)
SODIUM SERPL-SCNC: 135 MMOL/L (ref 136–145)

## 2021-01-09 PROCEDURE — 83735 ASSAY OF MAGNESIUM: CPT | Performed by: HOSPITALIST

## 2021-01-09 PROCEDURE — 84132 ASSAY OF SERUM POTASSIUM: CPT | Performed by: HOSPITALIST

## 2021-01-09 PROCEDURE — 85730 THROMBOPLASTIN TIME PARTIAL: CPT | Performed by: HOSPITALIST

## 2021-01-09 PROCEDURE — 80053 COMPREHEN METABOLIC PANEL: CPT | Performed by: HOSPITALIST

## 2021-01-09 RX ORDER — POTASSIUM CHLORIDE 20 MEQ/1
40 TABLET, EXTENDED RELEASE ORAL EVERY 4 HOURS
Status: COMPLETED | OUTPATIENT
Start: 2021-01-09 | End: 2021-01-09

## 2021-01-09 RX ORDER — BISACODYL 10 MG
10 SUPPOSITORY, RECTAL RECTAL
Status: DISCONTINUED | OUTPATIENT
Start: 2021-01-09 | End: 2021-01-10

## 2021-01-09 RX ORDER — HEPARIN SODIUM AND DEXTROSE 10000; 5 [USP'U]/100ML; G/100ML
INJECTION INTRAVENOUS CONTINUOUS
Status: DISCONTINUED | OUTPATIENT
Start: 2021-01-09 | End: 2021-01-10

## 2021-01-09 RX ORDER — NALOXONE HYDROCHLORIDE 0.4 MG/ML
80 INJECTION, SOLUTION INTRAMUSCULAR; INTRAVENOUS; SUBCUTANEOUS AS NEEDED
Status: ACTIVE | OUTPATIENT
Start: 2021-01-09 | End: 2021-01-09

## 2021-01-09 RX ORDER — HEPARIN SODIUM AND DEXTROSE 10000; 5 [USP'U]/100ML; G/100ML
18 INJECTION INTRAVENOUS ONCE
Status: COMPLETED | OUTPATIENT
Start: 2021-01-09 | End: 2021-01-09

## 2021-01-09 RX ORDER — DEXTROSE MONOHYDRATE 25 G/50ML
50 INJECTION, SOLUTION INTRAVENOUS
Status: DISCONTINUED | OUTPATIENT
Start: 2021-01-09 | End: 2021-01-10

## 2021-01-09 RX ORDER — SODIUM CHLORIDE, SODIUM LACTATE, POTASSIUM CHLORIDE, CALCIUM CHLORIDE 600; 310; 30; 20 MG/100ML; MG/100ML; MG/100ML; MG/100ML
INJECTION, SOLUTION INTRAVENOUS CONTINUOUS
Status: DISCONTINUED | OUTPATIENT
Start: 2021-01-09 | End: 2021-01-10

## 2021-01-09 NOTE — PROGRESS NOTES
[facility]    Progress Note    Carla Melara Patient Status:  Inpatient    1/10/1940 MRN [id number]   [facility] 4NW-A Attending Kylah Carl, *   Hosp Day # 3 PCP Tamika Castillo MD     Subjective:  Carla Ontiveros Obesity     Acute strain of neck muscle, subsequent encounter     Cervical stenosis of spinal canal     DDD (degenerative disc disease), cervical     Spondylosis of cervical region without myelopathy or radiculopathy     Foraminal stenosis of cervical manisha

## 2021-01-09 NOTE — PLAN OF CARE
Pt is A&ox4 . Sclera is jaundiced, skin is jaundiced. Lungs are clear bilaterally. Pulses are strong in all four extremities. Abdomen is rounded, and non tender. Pt complained of abdominal pain x1, medication given see MAR. Pt is up with standby.  No compla

## 2021-01-09 NOTE — PROGRESS NOTES
[facility] Hospitalist Progress Note     PCP: Anyi Kraus MD    CC:  Follow up    SUBJECTIVE:  Sitting up in chair, feels better. tbili increased. No bm overnight.   at bedside    OBJECTIVE:  Temp:  [97.7 °F (36.5 °C)-97.8 °F (36.6 °C)] 97.8 dextrose **OR** glucose **OR** Glucose-Vitamin C, Atropine Sulfate, Naloxone HCl, bisacodyl, Calcium Carbonate Antacid, traMADol HCl, PEG 3350, magnesium hydroxide, bisacodyl, Fleet Enema, ondansetron HCl, Metoclopramide HCl, melatonin       Assessment/Jerica

## 2021-01-10 VITALS
TEMPERATURE: 98 F | OXYGEN SATURATION: 95 % | SYSTOLIC BLOOD PRESSURE: 102 MMHG | BODY MASS INDEX: 34.68 KG/M2 | HEART RATE: 79 BPM | WEIGHT: 172 LBS | RESPIRATION RATE: 18 BRPM | DIASTOLIC BLOOD PRESSURE: 44 MMHG | HEIGHT: 59 IN

## 2021-01-10 LAB
ALBUMIN SERPL-MCNC: 2.1 G/DL (ref 3.4–5)
ALBUMIN/GLOB SERPL: 0.6 {RATIO} (ref 1–2)
ALP LIVER SERPL-CCNC: 266 U/L
ALT SERPL-CCNC: 45 U/L
ANION GAP SERPL CALC-SCNC: 4 MMOL/L (ref 0–18)
AST SERPL-CCNC: 39 U/L (ref 15–37)
BILIRUB SERPL-MCNC: 11.4 MG/DL (ref 0.1–2)
BUN BLD-MCNC: 10 MG/DL (ref 7–18)
BUN/CREAT SERPL: 16.7 (ref 10–20)
CALCIUM BLD-MCNC: 7.7 MG/DL (ref 8.5–10.1)
CHLORIDE SERPL-SCNC: 107 MMOL/L (ref 98–112)
CO2 SERPL-SCNC: 25 MMOL/L (ref 21–32)
CREAT BLD-MCNC: 0.6 MG/DL
GLOBULIN PLAS-MCNC: 3.5 G/DL (ref 2.8–4.4)
GLUCOSE BLD-MCNC: 103 MG/DL (ref 70–99)
HAV IGM SER QL: 2 MG/DL (ref 1.6–2.6)
M PROTEIN MFR SERPL ELPH: 5.6 G/DL (ref 6.4–8.2)
OSMOLALITY SERPL CALC.SUM OF ELEC: 281 MOSM/KG (ref 275–295)
POTASSIUM SERPL-SCNC: 3.9 MMOL/L (ref 3.5–5.1)
SODIUM SERPL-SCNC: 136 MMOL/L (ref 136–145)

## 2021-01-10 PROCEDURE — 83735 ASSAY OF MAGNESIUM: CPT | Performed by: HOSPITALIST

## 2021-01-10 PROCEDURE — 80053 COMPREHEN METABOLIC PANEL: CPT | Performed by: INTERNAL MEDICINE

## 2021-01-10 NOTE — PLAN OF CARE
Pt A&Ox4, calm and pleasant. Received on heparin gtt, gtt stopped at 0200. Eliquis is held. Pt NPO since midnight for procedure today. VSS, remains afebrile, denies pain. Pt abdomen is round and distended. Bowel sounds in all four quadrants.  Pt skin and sc

## 2021-01-10 NOTE — PROGRESS NOTES
[facility]    Progress Note    Lonne Hodgkins Legittino Patient Status:  Inpatient    1/10/1940 MRN [id number]   [facility] 4NW-A Attending Ana Enciso, *   Hosp Day # 4 PCP Vanna Brittle, MD     Subjective:  Giles Ernandez pain, generalized     Acute pancreatitis, unspecified complication status, unspecified pancreatitis type     Hyperbilirubinemia           Plan:        Evan Libman  1/10/2021  10:58 AM

## 2021-01-10 NOTE — PLAN OF CARE
Patient with generalized jaundice. Patient's abdomen rounded, soft, non-tender, bowel sounds hypoactive. Patient voiding dark jersey urine Low residue diet tolerated Patient afebrile. Patient's vital signs stable.

## 2021-01-10 NOTE — PLAN OF CARE
Patient with generalized jaundice. Patient's abdomen distended, soft, tender, bowel sounds hypoactive. Low fiber diet tolerated well. Patient constipated, Dulcolax suppository given, with moderate result. Patient's vital signs stable.

## 2021-01-10 NOTE — DISCHARGE SUMMARY
General Medicine Discharge Summary     Patient ID:  Jakob Melara  80year old  [id number]  1/10/1940    Admit date: 1/6/2021    Discharge date and time:  1/10/21    Attending Physician: Bri Friend, *     Primary Care Physician: Apollo Loco thinners     **subacute anemia- stable Hb.  Not tachycardic, monitor     Consults: IP CONSULT TO GASTROENTEROLOGY  IP CONSULT TO HOSPITALIST    Radiology:  Ct Abdomen (attention Pancreas) (w+ Wo) (cpt=74170)    Result Date: 12/29/2020  PROCEDURE:  CT ABDOME pancreatic head and uncinate process and extends cephalad posterior to the 2nd portion of the duodenum toward the yahaira hepatis.   There is a 2nd finger like pseudocyst extending from the pancreatic neck anteriorly measuring 1.9 cm wide by 6 cm AP into the then cephalad of with dimensions of approximately 12 cm x 9 cm x 5 cm. Multilocular pseudocyst extending along the splenic vessels posterior to the body and tail with multiple locules some of which probably do not communicate with each other.   Nominal siz control.   Dictated by (CST): Oumar Patterson MD on 1/07/2021 at 6:55 PM     Finalized by (CST): Oumar Patterson MD on 1/07/2021 at 6:56 PM       Mri Abdomen&mrcp W/3d (w+w0)(cpt=74183/78503)    Result Date: 1/6/2021  PROCEDURE:  MRI ABDOMEN&MRCP W/3D (W+ collecting systems are not dilated. ADRENALS:  No mass or enlargement. AORTA/VASCULAR:  No aneurysm or dissection. RETROPERITONEUM:  No mass or adenopathy. BOWEL/MESENTERY:  No visible mass, obstruction, or bowel wall thickening.   ABDOMINAL WALL:  No m [facility] [address] Test  Monday Jan 11, 2021 9:15 AM  Please arrive no earlier than 10 minutes for your scheduled appointment. [address] [facility]   [address]   [address] [facility]  [phone]

## 2021-01-10 NOTE — PROGRESS NOTES
[facility] Hospitalist Progress Note     PCP: Ovidio Donnelly MD    CC:  Follow up    SUBJECTIVE:  Sitting up in bed feels better. tbili decreased. No bm overnight. +flatus.   at bedside    OBJECTIVE:  Temp:  [97.8 °F (36.6 °C)-98.3 °F (36.8 °C)] 01/06/21 1902     glucose **OR** Glucose-Vitamin C **OR** dextrose **OR** glucose **OR** Glucose-Vitamin C, bisacodyl, Calcium Carbonate Antacid, traMADol HCl, PEG 3350, magnesium hydroxide, bisacodyl, Fleet Enema, ondansetron HCl, Metoclopramide HCl, marlyn

## 2021-01-11 LAB — NON GYNE INTERPRETATION: NEGATIVE

## 2021-01-15 ENCOUNTER — LAB ENCOUNTER (OUTPATIENT)
Dept: LAB | Age: 81
End: 2021-01-15
Attending: INTERNAL MEDICINE
Payer: MEDICARE

## 2021-01-15 DIAGNOSIS — K83.1 BILIARY OBSTRUCTION: ICD-10-CM

## 2021-01-15 DIAGNOSIS — K85.00 IDIOPATHIC ACUTE PANCREATITIS WITHOUT INFECTION OR NECROSIS: ICD-10-CM

## 2021-01-15 DIAGNOSIS — R79.89 ELEVATED LIVER FUNCTION TESTS: ICD-10-CM

## 2021-01-15 LAB
ALBUMIN SERPL-MCNC: 2.5 G/DL (ref 3.4–5)
ALP LIVER SERPL-CCNC: 232 U/L
ALT SERPL-CCNC: 63 U/L
AST SERPL-CCNC: 92 U/L (ref 15–37)
BILIRUB DIRECT SERPL-MCNC: 4.3 MG/DL (ref 0–0.2)
BILIRUB SERPL-MCNC: 5.7 MG/DL (ref 0.1–2)
M PROTEIN MFR SERPL ELPH: 6.2 G/DL (ref 6.4–8.2)

## 2021-01-15 PROCEDURE — 36415 COLL VENOUS BLD VENIPUNCTURE: CPT

## 2021-01-15 PROCEDURE — 80076 HEPATIC FUNCTION PANEL: CPT

## 2021-01-20 ENCOUNTER — LAB ENCOUNTER (OUTPATIENT)
Dept: LAB | Age: 81
End: 2021-01-20
Attending: INTERNAL MEDICINE
Payer: MEDICARE

## 2021-01-20 DIAGNOSIS — K85.00 IDIOPATHIC ACUTE PANCREATITIS WITHOUT INFECTION OR NECROSIS: ICD-10-CM

## 2021-01-20 LAB
BUN BLD-MCNC: 11 MG/DL (ref 7–18)
CREAT BLD-MCNC: 0.74 MG/DL

## 2021-01-20 PROCEDURE — 36415 COLL VENOUS BLD VENIPUNCTURE: CPT

## 2021-01-20 PROCEDURE — 84520 ASSAY OF UREA NITROGEN: CPT

## 2021-01-20 PROCEDURE — 82565 ASSAY OF CREATININE: CPT

## 2021-02-08 ENCOUNTER — LAB ENCOUNTER (OUTPATIENT)
Dept: LAB | Age: 81
End: 2021-02-08
Attending: INTERNAL MEDICINE
Payer: MEDICARE

## 2021-02-08 DIAGNOSIS — R79.89 ELEVATED LIVER FUNCTION TESTS: ICD-10-CM

## 2021-02-08 LAB
ALBUMIN SERPL-MCNC: 3.1 G/DL (ref 3.4–5)
ALP LIVER SERPL-CCNC: 245 U/L
ALT SERPL-CCNC: 53 U/L
AST SERPL-CCNC: 57 U/L (ref 15–37)
BILIRUB DIRECT SERPL-MCNC: 1.2 MG/DL (ref 0–0.2)
BILIRUB SERPL-MCNC: 1.9 MG/DL (ref 0.1–2)
M PROTEIN MFR SERPL ELPH: 7.5 G/DL (ref 6.4–8.2)

## 2021-02-08 PROCEDURE — 36415 COLL VENOUS BLD VENIPUNCTURE: CPT

## 2021-02-08 PROCEDURE — 80076 HEPATIC FUNCTION PANEL: CPT

## 2021-02-19 ENCOUNTER — LAB ENCOUNTER (OUTPATIENT)
Dept: LAB | Age: 81
End: 2021-02-19
Attending: INTERNAL MEDICINE
Payer: MEDICARE

## 2021-02-19 ENCOUNTER — TELEPHONE (OUTPATIENT)
Dept: SCHEDULING | Age: 81
End: 2021-02-19

## 2021-02-19 DIAGNOSIS — K85.00 IDIOPATHIC ACUTE PANCREATITIS: ICD-10-CM

## 2021-02-19 DIAGNOSIS — K83.1 BILIARY OBSTRUCTION: ICD-10-CM

## 2021-02-19 DIAGNOSIS — R74.8 ELEVATED LIVER ENZYMES: Primary | ICD-10-CM

## 2021-02-19 DIAGNOSIS — R79.89 ELEVATED LIVER FUNCTION TESTS: ICD-10-CM

## 2021-02-19 DIAGNOSIS — K85.00 IDIOPATHIC ACUTE PANCREATITIS WITHOUT INFECTION OR NECROSIS: ICD-10-CM

## 2021-02-19 DIAGNOSIS — K64.8 HEMORRHOIDS, INTERNAL: ICD-10-CM

## 2021-02-19 LAB
CANCER AG19-9 SERPL-ACNC: 263.4 U/ML (ref ?–37)
IGA SERPL-MCNC: 295 MG/DL (ref 70–312)
TRIGL SERPL-MCNC: 144 MG/DL (ref 30–149)

## 2021-02-19 PROCEDURE — 83516 IMMUNOASSAY NONANTIBODY: CPT

## 2021-02-19 PROCEDURE — 82787 IGG 1 2 3 OR 4 EACH: CPT

## 2021-02-19 PROCEDURE — 84478 ASSAY OF TRIGLYCERIDES: CPT

## 2021-02-19 PROCEDURE — 86256 FLUORESCENT ANTIBODY TITER: CPT

## 2021-02-19 PROCEDURE — 36415 COLL VENOUS BLD VENIPUNCTURE: CPT

## 2021-02-19 PROCEDURE — 86301 IMMUNOASSAY TUMOR CA 19-9: CPT

## 2021-02-19 PROCEDURE — 82784 ASSAY IGA/IGD/IGG/IGM EACH: CPT

## 2021-02-22 LAB
IMMUNOGLOBULIN G SUBCLASS 1: 460 MG/DL
IMMUNOGLOBULIN G SUBCLASS 2: 533 MG/DL
IMMUNOGLOBULIN G SUBCLASS 3: 112 MG/DL
IMMUNOGLOBULIN G SUBCLASS 4: 47 MG/DL

## 2021-02-23 LAB — TTG IGA SER-ACNC: 0.8 U/ML (ref ?–7)

## 2021-03-19 ENCOUNTER — LAB ENCOUNTER (OUTPATIENT)
Dept: LAB | Age: 81
DRG: 919 | End: 2021-03-19
Attending: INTERNAL MEDICINE
Payer: MEDICARE

## 2021-03-19 DIAGNOSIS — K85.00 IDIOPATHIC ACUTE PANCREATITIS WITHOUT INFECTION OR NECROSIS: ICD-10-CM

## 2021-03-19 DIAGNOSIS — K83.1 BILIARY OBSTRUCTION: ICD-10-CM

## 2021-03-19 DIAGNOSIS — R79.89 ELEVATED LIVER FUNCTION TESTS: ICD-10-CM

## 2021-03-19 LAB
ALBUMIN SERPL-MCNC: 2.8 G/DL (ref 3.4–5)
ALP LIVER SERPL-CCNC: 427 U/L
ALT SERPL-CCNC: 97 U/L
AST SERPL-CCNC: 92 U/L (ref 15–37)
BILIRUB DIRECT SERPL-MCNC: 5.4 MG/DL (ref 0–0.2)
BILIRUB SERPL-MCNC: 6.8 MG/DL (ref 0.1–2)
M PROTEIN MFR SERPL ELPH: 7.6 G/DL (ref 6.4–8.2)

## 2021-03-19 PROCEDURE — 80076 HEPATIC FUNCTION PANEL: CPT

## 2021-03-19 PROCEDURE — 36415 COLL VENOUS BLD VENIPUNCTURE: CPT

## 2021-03-22 ENCOUNTER — APPOINTMENT (OUTPATIENT)
Dept: ULTRASOUND IMAGING | Facility: HOSPITAL | Age: 81
DRG: 919 | End: 2021-03-22
Attending: EMERGENCY MEDICINE
Payer: MEDICARE

## 2021-03-22 ENCOUNTER — HOSPITAL ENCOUNTER (INPATIENT)
Facility: HOSPITAL | Age: 81
LOS: 5 days | Discharge: HOME | DRG: 919 | End: 2021-03-27
Attending: EMERGENCY MEDICINE | Admitting: HOSPITALIST
Payer: MEDICARE

## 2021-03-22 DIAGNOSIS — T85.590A OBSTRUCTION OF BILIARY STENT, INITIAL ENCOUNTER: ICD-10-CM

## 2021-03-22 DIAGNOSIS — R17 JAUNDICE: Primary | ICD-10-CM

## 2021-03-22 DIAGNOSIS — E87.6 HYPOKALEMIA: ICD-10-CM

## 2021-03-22 PROCEDURE — 99285 EMERGENCY DEPT VISIT HI MDM: CPT

## 2021-03-22 PROCEDURE — 80053 COMPREHEN METABOLIC PANEL: CPT | Performed by: EMERGENCY MEDICINE

## 2021-03-22 PROCEDURE — 36415 COLL VENOUS BLD VENIPUNCTURE: CPT

## 2021-03-22 PROCEDURE — 76700 US EXAM ABDOM COMPLETE: CPT | Performed by: EMERGENCY MEDICINE

## 2021-03-22 PROCEDURE — 83690 ASSAY OF LIPASE: CPT | Performed by: EMERGENCY MEDICINE

## 2021-03-22 PROCEDURE — 85025 COMPLETE CBC W/AUTO DIFF WBC: CPT | Performed by: EMERGENCY MEDICINE

## 2021-03-22 RX ORDER — TRAMADOL HYDROCHLORIDE 50 MG/1
50 TABLET ORAL EVERY 6 HOURS PRN
Status: DISCONTINUED | OUTPATIENT
Start: 2021-03-22 | End: 2021-03-27

## 2021-03-22 RX ORDER — ASPIRIN 81 MG/1
81 TABLET ORAL DAILY PRN
Status: ON HOLD | COMMUNITY
End: 2021-06-11

## 2021-03-22 RX ORDER — OMEGA-3/DHA/EPA/FISH OIL 60 MG-90MG
500 CAPSULE ORAL EVERY OTHER DAY
COMMUNITY

## 2021-03-22 RX ORDER — SODIUM PHOSPHATE, DIBASIC AND SODIUM PHOSPHATE, MONOBASIC 7; 19 G/133ML; G/133ML
1 ENEMA RECTAL ONCE AS NEEDED
Status: DISCONTINUED | OUTPATIENT
Start: 2021-03-22 | End: 2021-03-27

## 2021-03-22 RX ORDER — ONDANSETRON 2 MG/ML
4 INJECTION INTRAMUSCULAR; INTRAVENOUS EVERY 6 HOURS PRN
Status: DISCONTINUED | OUTPATIENT
Start: 2021-03-22 | End: 2021-03-27

## 2021-03-22 RX ORDER — POLYETHYLENE GLYCOL 3350 17 G/17G
17 POWDER, FOR SOLUTION ORAL DAILY PRN
Status: DISCONTINUED | OUTPATIENT
Start: 2021-03-22 | End: 2021-03-27

## 2021-03-22 RX ORDER — METOCLOPRAMIDE HYDROCHLORIDE 5 MG/ML
10 INJECTION INTRAMUSCULAR; INTRAVENOUS EVERY 8 HOURS PRN
Status: DISCONTINUED | OUTPATIENT
Start: 2021-03-22 | End: 2021-03-27

## 2021-03-22 RX ORDER — B-COMPLEX WITH VITAMIN C
1 TABLET ORAL DAILY
COMMUNITY

## 2021-03-22 RX ORDER — LORAZEPAM 2 MG/ML
0.25 INJECTION INTRAMUSCULAR ONCE AS NEEDED
Status: COMPLETED | OUTPATIENT
Start: 2021-03-23 | End: 2021-03-23

## 2021-03-22 RX ORDER — SODIUM CHLORIDE 9 MG/ML
INJECTION, SOLUTION INTRAVENOUS CONTINUOUS
Status: DISCONTINUED | OUTPATIENT
Start: 2021-03-22 | End: 2021-03-25

## 2021-03-22 RX ORDER — MAGNESIUM OXIDE 400 MG (241.3 MG MAGNESIUM) TABLET
1 TABLET NIGHTLY PRN
Status: DISCONTINUED | OUTPATIENT
Start: 2021-03-22 | End: 2021-03-27

## 2021-03-22 RX ORDER — BISACODYL 10 MG
10 SUPPOSITORY, RECTAL RECTAL
Status: DISCONTINUED | OUTPATIENT
Start: 2021-03-22 | End: 2021-03-27

## 2021-03-22 NOTE — ED INITIAL ASSESSMENT (HPI)
PT TO ED FROM HOME WITH C/O LOOSE STOOLS X1 MONTH BECOMING MORE FREQUENT, + EPIGASTRIC PAIN X 2 WEEKS AND REPORTED ABDOMINAL DISTENTION. PT REPORTS TENDER \"LUMP\" OVER XIPHOID PROCESS. DECREASE APPETITE, INCREASE LOSS OF HAIR X 2 MONTHS.  DENIES N/V, PT Arthur Waddell

## 2021-03-23 ENCOUNTER — APPOINTMENT (OUTPATIENT)
Dept: GENERAL RADIOLOGY | Facility: HOSPITAL | Age: 81
DRG: 919 | End: 2021-03-23
Attending: INTERNAL MEDICINE
Payer: MEDICARE

## 2021-03-23 ENCOUNTER — ANESTHESIA EVENT (OUTPATIENT)
Dept: ENDOSCOPY | Facility: HOSPITAL | Age: 81
DRG: 919 | End: 2021-03-23
Payer: MEDICARE

## 2021-03-23 ENCOUNTER — ANESTHESIA (OUTPATIENT)
Dept: ENDOSCOPY | Facility: HOSPITAL | Age: 81
DRG: 919 | End: 2021-03-23
Payer: MEDICARE

## 2021-03-23 PROCEDURE — 80053 COMPREHEN METABOLIC PANEL: CPT | Performed by: INTERNAL MEDICINE

## 2021-03-23 PROCEDURE — 84132 ASSAY OF SERUM POTASSIUM: CPT | Performed by: INTERNAL MEDICINE

## 2021-03-23 PROCEDURE — 0F798DZ DILATION OF COMMON BILE DUCT WITH INTRALUMINAL DEVICE, VIA NATURAL OR ARTIFICIAL OPENING ENDOSCOPIC: ICD-10-PCS | Performed by: INTERNAL MEDICINE

## 2021-03-23 PROCEDURE — BF10YZZ FLUOROSCOPY OF BILE DUCTS USING OTHER CONTRAST: ICD-10-PCS | Performed by: INTERNAL MEDICINE

## 2021-03-23 PROCEDURE — 0FPB8DZ REMOVAL OF INTRALUMINAL DEVICE FROM HEPATOBILIARY DUCT, VIA NATURAL OR ARTIFICIAL OPENING ENDOSCOPIC: ICD-10-PCS | Performed by: INTERNAL MEDICINE

## 2021-03-23 PROCEDURE — 74328 X-RAY BILE DUCT ENDOSCOPY: CPT | Performed by: INTERNAL MEDICINE

## 2021-03-23 PROCEDURE — 85025 COMPLETE CBC W/AUTO DIFF WBC: CPT | Performed by: INTERNAL MEDICINE

## 2021-03-23 PROCEDURE — 82248 BILIRUBIN DIRECT: CPT | Performed by: HOSPITALIST

## 2021-03-23 PROCEDURE — 84443 ASSAY THYROID STIM HORMONE: CPT | Performed by: INTERNAL MEDICINE

## 2021-03-23 PROCEDURE — 82728 ASSAY OF FERRITIN: CPT | Performed by: INTERNAL MEDICINE

## 2021-03-23 PROCEDURE — 88104 CYTOPATH FL NONGYN SMEARS: CPT | Performed by: INTERNAL MEDICINE

## 2021-03-23 PROCEDURE — 80053 COMPREHEN METABOLIC PANEL: CPT | Performed by: HOSPITALIST

## 2021-03-23 PROCEDURE — 85610 PROTHROMBIN TIME: CPT | Performed by: INTERNAL MEDICINE

## 2021-03-23 PROCEDURE — 83550 IRON BINDING TEST: CPT | Performed by: INTERNAL MEDICINE

## 2021-03-23 PROCEDURE — 83540 ASSAY OF IRON: CPT | Performed by: INTERNAL MEDICINE

## 2021-03-23 PROCEDURE — 0FD98ZX EXTRACTION OF COMMON BILE DUCT, VIA NATURAL OR ARTIFICIAL OPENING ENDOSCOPIC, DIAGNOSTIC: ICD-10-PCS | Performed by: INTERNAL MEDICINE

## 2021-03-23 DEVICE — COTTON-LEUNG BILIARY STENT
Type: IMPLANTABLE DEVICE | Site: BILIARY | Status: FUNCTIONAL
Brand: COTTON-LEUNG

## 2021-03-23 RX ORDER — SODIUM CHLORIDE, SODIUM LACTATE, POTASSIUM CHLORIDE, CALCIUM CHLORIDE 600; 310; 30; 20 MG/100ML; MG/100ML; MG/100ML; MG/100ML
INJECTION, SOLUTION INTRAVENOUS CONTINUOUS
Status: DISCONTINUED | OUTPATIENT
Start: 2021-03-23 | End: 2021-03-27

## 2021-03-23 RX ORDER — POTASSIUM CHLORIDE 20 MEQ/1
40 TABLET, EXTENDED RELEASE ORAL EVERY 4 HOURS
Status: COMPLETED | OUTPATIENT
Start: 2021-03-23 | End: 2021-03-23

## 2021-03-23 RX ORDER — NALOXONE HYDROCHLORIDE 0.4 MG/ML
80 INJECTION, SOLUTION INTRAMUSCULAR; INTRAVENOUS; SUBCUTANEOUS AS NEEDED
Status: DISCONTINUED | OUTPATIENT
Start: 2021-03-23 | End: 2021-03-23 | Stop reason: HOSPADM

## 2021-03-23 RX ORDER — LIDOCAINE HYDROCHLORIDE 10 MG/ML
INJECTION, SOLUTION EPIDURAL; INFILTRATION; INTRACAUDAL; PERINEURAL AS NEEDED
Status: DISCONTINUED | OUTPATIENT
Start: 2021-03-23 | End: 2021-03-23 | Stop reason: SURG

## 2021-03-23 RX ORDER — LEVOFLOXACIN 5 MG/ML
500 INJECTION, SOLUTION INTRAVENOUS
Status: COMPLETED | OUTPATIENT
Start: 2021-03-23 | End: 2021-03-23

## 2021-03-23 RX ADMIN — LEVOFLOXACIN 500 MG: 5 INJECTION, SOLUTION INTRAVENOUS at 13:52:00

## 2021-03-23 RX ADMIN — LIDOCAINE HYDROCHLORIDE 50 MG: 10 INJECTION, SOLUTION EPIDURAL; INFILTRATION; INTRACAUDAL; PERINEURAL at 13:48:00

## 2021-03-23 RX ADMIN — SODIUM CHLORIDE: 9 INJECTION, SOLUTION INTRAVENOUS at 14:36:00

## 2021-03-23 NOTE — DIETARY MALNUTRITION NOTE
[facility]    NUTRITION ASSESSMENT    Pt meets chronic - moderate malnutrition criteria.     CRITERIA FOR MALNUTRITION DIAGNOSIS:  Criteria for non-severe malnutrition diagnosis: chronic illness related to wt loss 7.5% in 3 months, energy intake less t <75%  Intake Meeting Needs: No  Food Allergies: No  Cultural/Ethnic/Jain Preferences Addresses: Yes    GI SYSTEM REVIEW: WNL    NUTRITION RELATED PHYSICAL FINDINGS:     1. Body Fat/Muscle Mass: mild depletion body fat and mild depletion muscle mass

## 2021-03-23 NOTE — ANESTHESIA POSTPROCEDURE EVALUATION
[address] [address] Patient Status:  Inpatient   Age/Gender 80year old female MRN [id number]   Location [address] [facility]. Attending Khanh Tobin, [address] [facility] Day # 1 PCP Aparna Holt MD       Anesthesia Post-op Note

## 2021-03-23 NOTE — PLAN OF CARE
Problem: Patient/Family Goals  Goal: Patient/Family Long Term Goal  Description: Patient's Long Term Goal: discharge home    Interventions:  - manage pain  - GI eval  - tolerate diet   - See additional Care Plan goals for specific interventions  Outcome: with appropriate resources  Description: INTERVENTIONS:  - Identify barriers to discharge w/pt and caregiver  - Include patient/family/discharge partner in discharge planning  - Arrange for needed discharge resources and transportation as appropriate  - Id

## 2021-03-23 NOTE — PROGRESS NOTES
Pt returned from endo in stable condition. VS stable, afebrile. Sitting up in chair. Pt complaining of sore throat. Otherwise patient is comfortable. Clear liquid diet. Up with SB assist.  at bedside. Will continue to monitor.     0- paged MD freeman

## 2021-03-23 NOTE — ED PROVIDER NOTES
Patient Seen in: [facility] Emergency Department      History   Patient presents with:  Abdominal Pain    Stated Complaint: soft stools x2-3 weeks, pt has become jaundice, MD advised pt to go to ED.      HPI/Subjective:   HPI    68-year-old female pr [facility]   • CERVICAL EPIDURAL N/A 3/29/2016    Performed by Erasmo Reece MD at [address]   • COLONOSCOPY  2011= Polyps   • COLONOSCOPY  5/20/14= Diverticulosis    Repeat 2019   • COLONOSCOPY N/A 5/20/2014    Perfo Wt 70.3 kg   SpO2 95%   BMI 31.31 kg/m²         Physical Exam  Vitals and nursing note reviewed. Constitutional:       Appearance: Normal appearance. She is well-developed. HENT:      Head: Normocephalic and atraumatic.    Eyes:      General: Scleral ic Neutrophil Absolute 8.95 (*)     Lymphocyte Absolute 0.75 (*)     All other components within normal limits   LIPASE - Normal   RAPID SARS-COV-2 BY PCR - Normal   CBC WITH DIFFERENTIAL WITH PLATELET    Narrative:      The following orders were created for p somewhat obscured by bowel gas, limiting evaluation. Spleen: The spleen is not enlarged and has normal shape and contour. Kidneys: The right kidney measures 12.2 cm in length and the left kidney measures 11.4 cm in length. There is no hydronephrosis.   Ri elevated bilirubin as well. Therefore we at this time we will not do antibiotics with the anticipation that she will go for ERCP in the morning and have replacement of the biliary stent.   I reviewed all this with the patient and her  and they are i

## 2021-03-23 NOTE — CONSULTS
GASTROENTEROLOGY CONSULTATION  Channing Iglesias MD    [facility]enter[facility]:  [facility]    1/10/1940 MRN [id number]   [facility] 3NW-A Attending Garo Vivas bladder wall thickening. The gallbladder wall measures 4 mm. No Cabrera's sign was elicited during this examination. There is a small amount of pericholecystic   fluid present.       Pancreas:  The neck and the body of the pancreas is visualized and appears to the level of the portal confluence. Gallbladder/biliary tree: The gallbladder is present. There is mild central intrahepatic biliary   ductal dilatation.  There is circumferential thickening of the gallbladder neck and common hepatic   duct extending t OPERATIVE REPORT   PATIENT NAME: Leeanne Donis  MRN: [id number]  DATE OF OPERATION: 1/7/2021  PREOPERATIVE DIAGNOSIS: biliary obstruction; acute pancreatitis  POSTOPERATIVE DIAGNOSIS:                1.  EUS                            - biliary obstruc noted to come out of the liver and the portal confluence was seen and pancreas was noted. PANCREAS:  Pancreatic neck, body, and tail were interrogated. They were without any evidence of chronic pancreatitis, neoplasms, or cysts.   Pancreatic duct was nor CBD. Contrast injection showed normal distal CBD. There was a high grade 1.5 cm stricture with proximal CBD and intrahepatic ductal dilation. Stricture was brushed with cytology brush.   A 10 FR x 10 cm biliary stricture was placed across the stricture wi EH MAIN OR   •       x 4   • CERVICAL EPIDURAL N/A 2016    Performed by Etienne Crowley MD at [address] [facility] 2016    Performed by Etienne Crowley MD at [address] [facility] E Intravenous, Q6H PRN  •  Metoclopramide HCl (REGLAN) injection 10 mg, 10 mg, Intravenous, Q8H PRN  •  traMADol HCl (ULTRAM) tab 50 mg, 50 mg, Oral, Q6H PRN  •  Piperacillin Sod-Tazobactam So (ZOSYN) 3.375 g in dextrose 5% 100 mL IVPB-ADDV, 3.375 g, Nany Ferreira masses. Bowel sounds are present. Back: No CVA tenderness. Extremities: No edema, cyanosis, or clubbing. Skin: Warm and dry. Rectal:deferred  Laboratory Data:  Lab Results   Component Value Date    WBC 10.7 03/22/2021    HGB 13.0 03/22/2021    HCT 37.

## 2021-03-23 NOTE — PLAN OF CARE
NURSING ADMISSION NOTE      Patient admitted via Cart from ED. Oriented to room. Safety precautions initiated. Bed in low position. Call light in reach. Pt arrived to unit in stable condition. A&O x4, VSS afebrile.  Pt reports mild abdominal blanca

## 2021-03-23 NOTE — ANESTHESIA PREPROCEDURE EVALUATION
PRE-OP EVALUATION    Patient Name: Seamus Pena    Admit Diagnosis: Hypokalemia [E87.6]  Jaundice [R17]  Obstruction of biliary stent, initial encounter [F65.882H]    Pre-op Diagnosis: biliary obstruction    ENDOSCOPIC RETROGRADE CHOLANGIOPANCREATOG 3/22/2021 at 0900  Multiple Vitamin (TAB-A-REENA) Oral Tab, Take 1 tablet by mouth daily. , Disp: , Rfl: , 3/22/2021 at 0900        Allergies: Adhesive Tape      Anesthesia Evaluation    Patient summary reviewed.     Anesthetic Complications  (-) history of a ALLOGRAFT BONE AND MACHINED ALLOY PLATING   • OTHER  3/30/2017    Anterior cervical wound exploration for redirection of the screws at Cervical 6 level, Dr. Lobito Weber   • [address] [facility]  1/2017    AT C6     Social History    Tobacco Use

## 2021-03-23 NOTE — PLAN OF CARE
Pt alert and oriented x4. Pt up ad dale after setup, ambulating without difficulty. Strict NPO. K being replaced per GI Md orders. Voiding without difficulty. SCDs on. IVF infusing per mar. Abd soft, hypoactive.  at bedside.  Consent signed and on carlos eduardo assistance with activity based on assessment  - Modify environment to reduce risk of injury  - Provide assistive devices as appropriate  - Consider OT/PT consult to assist with strengthening/mobility  - Encourage toileting schedule  Outcome: Progressing

## 2021-03-23 NOTE — H&P
[facility] Hospitalist Team  History and Physical       Assessment/Plan:     #Biliary Obstruction  #Jaundice  #Hx of idiopathic pancreatitis  -us showing CBD dilatation and mild intrahepatic biliary dilatation, mild gb wall thickening, sludge and pericholecystic CERVICAL EPIDURAL N/A 3/29/2016    Performed by Nilo Gomez MD at [address]   • COLONOSCOPY  2011= Polyps   • COLONOSCOPY  5/20/14= Diverticulosis    Repeat 2019   • COLONOSCOPY N/A 5/20/2014    Performed by Jose A Alfonso MD at E polyuria (-) polydipsia  HEMATOLOGICAL:  (-) easy bruising (-) bleeding    OBJECTIVE:  /60 (BP Location: Left arm)   Pulse 74   Temp 98.1 °F (36.7 °C) (Oral)   Resp 18   Ht 4' 11\" (1.499 m)   Wt 155 lb (70.3 kg)   SpO2 97%   BMI 31.31 kg/m²   Genera exam includes images of the liver, gallbladder, common bile duct, pancreas, spleen, kidneys, IVC, and aorta.   PATIENT STATED HISTORY: (As transcribed by Technologist)  Patient presents with epigastric pain, jaundice, and abdominal distension for approximat abdomen, 6/30/2009 TECHNIQUE: Multiplanar, multiecho MR imaging of the abdomen was performed before and after the uneventful administration of 7.5 mL of Gadavist intervenous contrast. Vial size: 7.5 mL. FINDINGS: ? Liver: The liver is normal in size.  No fo secondary to moderate narrowing/thickening of the proximal extrahepatic bile duct with associated gallbladder neck thickening. Findings are likely reactive from underlying pancreatitis. 3.  Mild periportal lymphadenopathy, likely reactive in etiology.  4.

## 2021-03-24 ENCOUNTER — APPOINTMENT (OUTPATIENT)
Dept: MRI IMAGING | Facility: HOSPITAL | Age: 81
DRG: 919 | End: 2021-03-24
Attending: INTERNAL MEDICINE
Payer: MEDICARE

## 2021-03-24 PROCEDURE — A9575 INJ GADOTERATE MEGLUMI 0.1ML: HCPCS | Performed by: INTERNAL MEDICINE

## 2021-03-24 PROCEDURE — 80053 COMPREHEN METABOLIC PANEL: CPT | Performed by: INTERNAL MEDICINE

## 2021-03-24 PROCEDURE — 85025 COMPLETE CBC W/AUTO DIFF WBC: CPT | Performed by: INTERNAL MEDICINE

## 2021-03-24 PROCEDURE — 76376 3D RENDER W/INTRP POSTPROCES: CPT | Performed by: INTERNAL MEDICINE

## 2021-03-24 PROCEDURE — 74183 MRI ABD W/O CNTR FLWD CNTR: CPT | Performed by: INTERNAL MEDICINE

## 2021-03-24 NOTE — PROGRESS NOTES
[facility] Hospitalist Team  Progress Note      Andi Roque  1/10/1940    Assessment/Plan:     #Biliary Obstruction  #Jaundice  #Hx of idiopathic pancreatitis  -us showing CBD dilatation and mild intrahepatic biliary dilatation, mild gb wall thickening, s Continuous Infusions:   • lactated ringers     • sodium chloride 83 mL/hr at 03/24/21 0305     PRN: PEG 3350, magnesium hydroxide, bisacodyl, Fleet Enema, ondansetron HCl, Metoclopramide HCl, traMADol HCl, melatonin       Principal Problem:    Jaundice

## 2021-03-24 NOTE — H&P
History & Physical Examination    Patient Name: Johnathan Fregoso  MRN: [id number]  CSN: [id number]  YOB: 1940    Diagnosis: Hypokalemia [E87.6]  Jaundice [R17]  Obstruction of biliary stent, initial encounter [M87.258H]      Present Illness BG & INST 1 LEVEL N/A 3/30/2017    Performed by Laverne Primrose, DO at [address] [facility]   • ANTERIOR CERVICAL FUSION BG & INST 2 LEVEL N/A 2016    Performed by Laverne Primrose, DO at [address] [facility] OR   •       x 4   • CERVICAL EPIDURAL N/A 2016 alternatives with the patient/family. They understand and agree to proceed with plan of care. [ x ] I have reviewed the History and Physical done within the last 30 days. Any changes noted above.     Mariaa Owens  3/23/2021  8:11 PM

## 2021-03-24 NOTE — PLAN OF CARE
Pt resting in chair. Denies any pain or nausea at the the moment. Reports x1 bm this morning. Ambulating. Poc updated, pt verbalized understanding.   Problem: PAIN - ADULT  Goal: Verbalizes/displays adequate comfort level or patient's stated pain goal  Desc

## 2021-03-24 NOTE — OPERATIVE REPORT
[address] OPERATIVE REPORT   PATIENT NAME: Roberta Lopez  MRN: [id number]  DATE OF OPERATION: 3/23/2021  PREOPERATIVE DIAGNOSIS: obstructive jaundice; dilated biliary tree; history of proximal CBD stricture s/p stent placement Jan 2021  Codey Allen seen with food debris around it and appeared occluded. A 035\" guidewire was placed using a 9-12mm extraction balloon along side the biliary stent and stent was removed with snare.  Occlusion cholangiogram showed a normal CBD except in the proximal portion

## 2021-03-24 NOTE — PROGRESS NOTES
[facility]  Progress Note    Jerardo Melara Patient Status:  Inpatient    1/10/1940 MRN [id number]   [facility] 3NW-A Attending Obdulio Polk, [address] [facility] Day # 2 PCP Yue Jang MD     Subjective:  Feeling well - no a PAIN MANAGEMENT   • CERVICAL EPIDURAL N/A 3/29/2016    Performed by Johann Torres MD at [address]   • COLONOSCOPY  2011= Polyps   • COLONOSCOPY  5/20/14= Diverticulosis    Repeat 2019   • COLONOSCOPY N/A 5/20/2014    Performed by Jose De Jesus Garrido PHOSPHATASE      55 - 142 U/L 321 (H)   Total Bilirubin      0.1 - 2.0 mg/dL 5.3 (H)   TOTAL PROTEIN      6.4 - 8.2 g/dL 6.5   Albumin      3.4 - 5.0 g/dL 2.1 (L)       Assessment:     CBD stricture  Pancreatitis with biliary stricture   S/p EUS and ercp w

## 2021-03-24 NOTE — CDS QUERY
Impaired Nutritional Status  DOCUMENTATION CLARIFICATION FORM  Dear Doctor:  Clinical information suggests impaired nutritional status.  For accurate ICD-10-CM code assignment,   PLEASE “X” THE  DIAGNOSIS THAT APPLIES TO CURRENT NUTRITIONAL STATUS:     [ reports recent hair loss. Tried ensure PTA but only able to get about 1/2 bottle down per day. For questions regarding this query, please contact Clinical Documentation :  Agustina Bonilla RN, BSN  Ext [phone] or Senait Glass. s

## 2021-03-24 NOTE — CM/SW NOTE
03/24/21 0900   CM/SW Screening   Referral Source Social Work (self-referral)   Information Source Chart review;Nursing rounds   Patient was screened during rounds and no needs are identified at this time. RN to contact SW/CM if needs arise.         Isaias

## 2021-03-24 NOTE — PLAN OF CARE
A&Ox4. VSS. RA. . No telemetry ordered--no cardiac symptoms. GI: Abdomen soft, nondistended. Passing gas. Denies nausea. : Voids. Denies pain--occasional very mild pain. Has not needed pain medication.   Up with standby assist.  Diet: Clear liquid risk of falls.   - [place] fall precautions as indicated by assessment.  - Educate pt/family on patient safety including physical limitations  - Instruct pt to call for assistance with activity based on assessment  - Modify environment to reduce risk of i

## 2021-03-25 PROCEDURE — 80053 COMPREHEN METABOLIC PANEL: CPT | Performed by: INTERNAL MEDICINE

## 2021-03-25 PROCEDURE — 85025 COMPLETE CBC W/AUTO DIFF WBC: CPT | Performed by: INTERNAL MEDICINE

## 2021-03-25 RX ORDER — LEVOFLOXACIN 750 MG/1
750 TABLET ORAL DAILY
Status: DISCONTINUED | OUTPATIENT
Start: 2021-03-25 | End: 2021-03-25

## 2021-03-25 NOTE — PROGRESS NOTES
Pt resting in bed, easy non labored breathing on ra. Vs wnl. Up with a little help, steady gait. Iv fluids infusing without difficulty. Pt tolerating regular diet. Denies any nausea. Pm meds admin. Plan of care discussed. All questions answered.  Instructed

## 2021-03-25 NOTE — CM/SW NOTE
Per unit RN, pt will need IVABx at dc, plan to dc tomorrow. SW sent Aidin referral to [facility] to check pt benefits, will follow up. Met with patient and her  at bedside. Discussed options, OP IVABx vs. Home infusion.  Pt would prefer to go to thei

## 2021-03-25 NOTE — PROGRESS NOTES
9281: PAGED DR. FORRESTER TO INQUIRE IF HE WAS WANTING A CT SCAN BEFORE WE LET PATIENT EAT (NOTHING ORDERED OR IN HIS NOTE BUT PATIENT THOUGHT HE TOLD HER SHE WAS GOING TO HAVE A CT TODAY).  [address] STATED NO FURTHER TESTS AND PATIENT CAN BE DISCHARGED HOME

## 2021-03-25 NOTE — PROGRESS NOTES
PATIENT HAS IV FLUIDS AT [facility] TO GO WITH IV ZOSYN, ON ROOM AIR, VOIDING WELL, SKIN AND SCLERA REMAIN JAUNDICED, TOLERATING A REGULAR DIET, DENIES PAIN. PLAN IS FOR PICC LINE TO BE PLACED AND DISCHARGE TOMORROW WITH IV ANTIBIOTICS.   PRESENT AT BEDSIDE

## 2021-03-25 NOTE — PROGRESS NOTES
[facility] Hospitalist Team  Progress Note      [facility]  1/10/1940    Assessment/Plan:     #Biliary Obstruction  #Jaundice  #Hx of idiopathic pancreatitis  -us showing CBD dilatation and mild intrahepatic biliary dilatation   -GI on consult  -ERCP don displayed. Recent Labs   Lab 03/23/21  0721 03/23/21  0841 03/23/21  2345 03/24/21  1404 03/25/21  0455   ALT 49 53 42 49 40   AST 44* 52* 43* 51* 45*   ALB 2.3* 2.3* 2.0* 2.1* 2.0*       No results for input(s): PGLU in the last 168 hours.     Meds:

## 2021-03-25 NOTE — CONSULTS
[facility] Pharmacy Note:  Renal Adjustment for levofloxacin [facility])    Zi Patterson is a 80year old patient who has been prescribed levofloxacin (LEVAQUIN) 500 mg every 24 hrs. The estimated creatinine clearance is 97.4 mL/min (A) (based on SCr of 0.

## 2021-03-25 NOTE — PROGRESS NOTES
[facility]  Progress Note    Bonita Melara Patient Status:  Inpatient    1/10/1940 MRN [id number]   [facility] 3NW-A Attending Robert Ernandez DO   Hosp Day # 3 PCP Pablo Epley, MD     Subjective:  Pt at baseline - no [facility]   • COLONOSCOPY  2011= Polyps   • COLONOSCOPY  5/20/14= Diverticulosis    Repeat 2019   • COLONOSCOPY N/A 5/20/2014    Performed by Donny Loera MD at [address] [place] [address] ENDOSCOPY   • ENDOSCOPIC RETROGRADE CHOLANGIOPANCREATOGRAPHY (ERCP) Lab Results   Component Value Date    WBC 7.8 03/25/2021    HGB 11.2 03/25/2021    HCT 33.4 03/25/2021    .0 03/25/2021    CREATSERUM 0.52 03/25/2021    BUN 8 03/25/2021     03/25/2021    K 3.5 03/25/2021     03/25/2021    CO2 24.0 03/ cm, previously measuring approximately 7.1 x 4.7 x 7.7  cm.     There is post-contrast enhancement along the rim of this abscess collection.  The internal contents are of low signal likely representing proteinaceous debris.  No surrounding inflammation is (W+W0)(CPT=74183/98458) (Order #[id number]) on 3/24/2021 - Order Result History Report         Assessment:     CBD stricture  Pancreatitis with biliary stricture               S/p EUS and ercp with exchange of stent, with 4.4 cm peripancreatic and duodenum

## 2021-03-25 NOTE — CONSULTS
INFECTIOUS DISEASE CONSULT NOTE    Iker Pillai [facility] Patient Status:  Inpatient    1/10/1940 MRN [id number]   [facility] 3NW-A Attending Gali Carney, [address] [facility] Day # 3 PCP [facility] 5/20/14= Diverticulosis    Repeat 2019   • COLONOSCOPY N/A 5/20/2014    Performed by Abigail Aburto MD at [address] (ERCP) N/A 1/7/2021    Performed by Josselin Jean MD at [facility] ENDOSCOPY   • [address] injection 4 mg, 4 mg, Intravenous, Q6H PRN  •  Metoclopramide HCl (REGLAN) injection 10 mg, 10 mg, Intravenous, Q8H PRN  •  traMADol HCl (ULTRAM) tab 50 mg, 50 mg, Oral, Q6H PRN  •  melatonin tab TABS 1 mg, 1 mg, Oral, Nightly PRN    Review of Systems: 7. 07 5.93 5.53   WBC 8.9 7.8 7.8   .0 315.0 328.0     Recent Labs   Lab 03/23/21  0841 03/23/21  0841 03/23/21  2345 03/24/21  1404 03/25/21  0455   *  --   --  91 101*   BUN 12  --   --  10 8   CREATSERUM 0.61  --   --  0.59 0.52*   GFRAA 10 gallbladder wall measures 4 mm. No Cabrera's sign was elicited during this examination. There is a small amount of pericholecystic fluid present. Pancreas: The neck and the body of the pancreas is visualized and appears within normal limits.  The head and t ABDOMEN+MRCP (ALL W+WO) (CPT=74183) CLINICAL HISTORY:  Elevated liver function tests.  COMPARISON: CT abdomen, 6/30/2009 TECHNIQUE: Multiplanar, multiecho MR imaging of the abdomen was performed before and after the uneventful administration of 7.5 mL of Ga the collection cannot be assessed by imaging. 2.  Mild intrahepatic biliary ductal dilatation secondary to moderate narrowing/thickening of the proximal extrahepatic bile duct with associated gallbladder neck thickening.  Findings are likely reactive from u 3.1 x 6.0 cm, previously measuring approximately 7.1 x 4.7 x 7.7  cm. There is post-contrast enhancement along the rim of this abscess collection. The internal contents are of low signal likely representing proteinaceous debris.   No surrounding inflammat stenting    PLAN:  - cont empiric zosyn for now  - already underwent EUS, will ask Dr Paula Zamorano whether further aspiration is possible either by him or IR  - would also plan on IV abx on dc, particularly if not able to drain collection even further as it may

## 2021-03-26 PROCEDURE — 02HV33Z INSERTION OF INFUSION DEVICE INTO SUPERIOR VENA CAVA, PERCUTANEOUS APPROACH: ICD-10-PCS | Performed by: HOSPITALIST

## 2021-03-26 PROCEDURE — 36573 INSJ PICC RS&I 5 YR+: CPT

## 2021-03-26 PROCEDURE — B548ZZA ULTRASONOGRAPHY OF SUPERIOR VENA CAVA, GUIDANCE: ICD-10-PCS | Performed by: HOSPITALIST

## 2021-03-26 RX ORDER — LIDOCAINE HYDROCHLORIDE 10 MG/ML
5 INJECTION, SOLUTION EPIDURAL; INFILTRATION; INTRACAUDAL; PERINEURAL ONCE
Status: DISCONTINUED | OUTPATIENT
Start: 2021-03-26 | End: 2021-03-27

## 2021-03-26 NOTE — PLAN OF CARE
Assumed care of patient at 2000. Denies any pain. IV zosyn. Tolerating regular diet. Plan for PICC placement.  Will continue to monitor

## 2021-03-26 NOTE — PROGRESS NOTES
Case discussed with Dr. Nik Blanton.   Abscess not drainable with EUS due to lack of mature encapsulated wall     Will discuss aspiration with EUS if needed for C&S - otherwise broad spectrum antibioitcs per Dr. Nik Blanton' recommendation

## 2021-03-26 NOTE — PROGRESS NOTES
[facility] Hospitalist Team  Progress Note      Cherie Roque  1/10/1940    Assessment/Plan:     #Biliary Obstruction  #Jaundice  #Hx of idiopathic pancreatitis  -us showing CBD dilatation and mild intrahepatic biliary dilatation   -GI on consult  -ERCP don 0.52*   CA 9.1  --  8.9 9.2  --  9.0 8.6   *  --  111* 106*  --  91 101*    < > = values in this interval not displayed.        Recent Labs   Lab 03/23/21  0721 03/23/21  0841 03/23/21  2345 03/24/21  1404 03/25/21  0455   ALT 49 53 42 49 40   AST 44

## 2021-03-26 NOTE — PROGRESS NOTES
Imaging reviewed with radiologist     Based on improved jaziel-pancreatic fluid collection (PFC) and lack of clinical features of abscess, will hold off EUS FNA sampling of fluid collection. Will discuss with ID regarding long term antibiotic use.     Fluid

## 2021-03-26 NOTE — CM/SW NOTE
MSW spoke with Celsa from ID. If the patient is able to get drained more today, they will send cultures for over the weekend. If not, the patient may dc home tomorrow on IV abx. MATT spoke with Matt Jones from [facility] at [phone].   She stated she has all t

## 2021-03-26 NOTE — PLAN OF CARE
Problem: Patient/Family Goals  Goal: Patient/Family Long Term Goal  Description: Patient's Long Term Goal: discharge home    Interventions:  - manage pain  - GI eval  - tolerate diet   -PICC placement, IV antibiotics  - See additional Care Plan goals for Discharge to home or other facility with appropriate resources  Description: INTERVENTIONS:  - Identify barriers to discharge w/pt and caregiver  - Include patient/family/discharge partner in discharge planning  - Arrange for needed discharge resources and

## 2021-03-26 NOTE — PROGRESS NOTES
PATIENT HAS IV ZOSYN INFUSING, ON ROOM AIR, PICC LINE PLACED FOR LONG TERM IV ANTIBIOTICS, SKIN AND SCLERA REMAIN JAUNDICED, TOLERATING A LOW FIBER DIET. PATIENT WAS SCHEDULED FOR EUS W/ FNA TODAY BUT WAS CANCELLED.  PLAN NOW IS TO [address]

## 2021-03-26 NOTE — PROGRESS NOTES
INFECTIOUS DISEASE PROGRESS NOTE    Uriah Roque Patient Status:  Inpatient    1/10/1940 MRN [id number]   [facility] 3NW-A Attending Valeria Gonzalez, [address] [facility] Day # 4 PCP Male auscultation bilaterally. No wheezes. No rhonchi. Cardiovascular: S1, S2.  Regular rate and rhythm. No murmurs. Abdomen: Soft, nontender, nondistended. Positive bowel sounds. Musculoskeletal: Full range of motion of all extremities.   No arthritis or liver, gallbladder, common bile duct, pancreas, spleen, kidneys, IVC, and aorta. PATIENT STATED HISTORY: (As transcribed by Technologist)  Patient presents with epigastric pain, jaundice, and abdominal distension for approximately 2 to 3 weeks.     FINDING TIME:  3 minutes 13 seconds TECHNOLOGIST TIME:  30 minutes RADIATION DOSE (AIR KERMA PRODUCT):  28.28mGy   FINDINGS:  Fluoroscopic guidance for placement of a biliary stent. CONCLUSION:  Fluoroscopic guidance for placement biliary stent.   Please yahaira hepatis. Spleen: Normal. Kidneys: Stable large Bosniak I right renal cyst. Adrenals: Normal. Excellent peritoneum: No free fluid. Lymph nodes: Mild periportal lymphadenopathy is noted with portacaval lymph node measuring 1.7 x 1.1 cm (32/6).  GI tract USED:  15mL of Dotarem  FINDINGS:  LIVER:  No enlargement, atrophy, abnormal density, or significant focal lesion. BILIARY:  There is mild distension of the gallbladder with evidence of layering biliary sludge. No stones or wall thickening.   No biliary d study. 2. No biliary ductal dilatation. There is mild gallbladder distension with biliary sludge. 3. No filling defects along the biliary tree suggested.  4. Multiple bilateral renal cysts are again noted and are relatively stable, including the large exop

## 2021-03-27 VITALS
DIASTOLIC BLOOD PRESSURE: 44 MMHG | SYSTOLIC BLOOD PRESSURE: 108 MMHG | RESPIRATION RATE: 18 BRPM | WEIGHT: 155 LBS | OXYGEN SATURATION: 99 % | TEMPERATURE: 98 F | HEART RATE: 79 BPM | BODY MASS INDEX: 31.25 KG/M2 | HEIGHT: 59 IN

## 2021-03-27 RX ORDER — TRAMADOL HYDROCHLORIDE 50 MG/1
50 TABLET ORAL EVERY 8 HOURS PRN
Qty: 9 TABLET | Refills: 0 | Status: SHIPPED | OUTPATIENT
Start: 2021-03-27 | End: 2021-03-30

## 2021-03-27 RX ORDER — LEVOFLOXACIN 750 MG/1
750 TABLET ORAL DAILY
Qty: 7 TABLET | Refills: 0 | Status: SHIPPED | OUTPATIENT
Start: 2021-03-27 | End: 2021-04-03

## 2021-03-27 RX ORDER — METRONIDAZOLE 500 MG/1
500 TABLET ORAL 3 TIMES DAILY
Qty: 21 TABLET | Refills: 0 | Status: SHIPPED | OUTPATIENT
Start: 2021-03-27 | End: 2021-04-03

## 2021-03-27 NOTE — PLAN OF CARE
Problem: Patient/Family Goals  Goal: Patient/Family Long Term Goal  Description: Patient's Long Term Goal: discharge home    Interventions:  - manage pain  - GI eval  - tolerate diet   -PICC placement, IV antibiotics  - See additional Care Plan goals for Discharge     Problem: DISCHARGE PLANNING  Goal: Discharge to home or other facility with appropriate resources  Description: INTERVENTIONS:  - Identify barriers to discharge w/pt and caregiver  - Include patient/family/discharge partner in discharge plann

## 2021-03-27 NOTE — PLAN OF CARE
Assumed care of pt at [address] [facility]. Pt denies pain remains on iv antibx. Pt remains jaundiced. Tolerating diet. Plan for dc home today. Voiding freely.

## 2021-03-27 NOTE — PROGRESS NOTES
INFECTIOUS DISEASE PROGRESS NOTE    Mukul Roque Patient Status:  Inpatient    1/10/1940 MRN [id number]   [facility] 3NW-A Attending Lv Lockhart, [address] [facility] Day # 5 PCP Male thrush  Neck: No lymphadenopathy. Supple. Respiratory: Clear to auscultation bilaterally. No wheezes. No rhonchi. Cardiovascular: S1, S2.  Regular rate and rhythm. No murmurs. Abdomen: Soft, nontender, nondistended. Positive bowel sounds.   [address] Extension was used to evaluate the abdomen. The exam includes images of the liver, gallbladder, common bile duct, pancreas, spleen, kidneys, IVC, and aorta.   PATIENT STATED HISTORY: (As transcribed by Technologist)  Patient presents with epigastric pain, jaundice, INDICATIONS:  ercp  FLUOROSCOPY IMAGES OBTAINED:  8 FLUOROSCOPY TIME:  3 minutes 13 seconds TECHNOLOGIST TIME:  30 minutes RADIATION DOSE (AIR KERMA PRODUCT):  28.28mGy   FINDINGS:  Fluoroscopic guidance for placement of a biliary stent.             Corey Lauren infiltration along the peripancreatic fluid collection extending to the yahaira hepatis. Spleen: Normal. Kidneys: Stable large Bosniak I right renal cyst. Adrenals: Normal. Excellent peritoneum: No free fluid.  Lymph nodes: Mild periportal lymphadenopathy is Technologist)  Patient states upper abdominal pain, jaundice. CONTRAST USED:  15mL of Dotarem  FINDINGS:  LIVER:  No enlargement, atrophy, abnormal density, or significant focal lesion.   BILIARY:  There is mild distension of the gallbladder with evidence enhancement along the rim of the abscess collection, similar to the previous study. 2. No biliary ductal dilatation. There is mild gallbladder distension with biliary sludge. 3. No filling defects along the biliary tree suggested.  4. Multiple bilateral re

## 2021-03-27 NOTE — PROGRESS NOTES
[facility] Hospitalist Team  Progress Note      [place] Markgarcia Roque  1/10/1940    Assessment/Plan:     #Biliary Obstruction  #Jaundice  #Hx of idiopathic pancreatitis  -us showing CBD dilatation and mild intrahepatic biliary dilatation   -ERCP done 3/23, stent wa 27.0  --  24.0 24.0   BUN 15  --  12 12  --  10 8   CREATSERUM 0.69  --  0.62 0.61  --  0.59 0.52*   CA 9.1  --  8.9 9.2  --  9.0 8.6   *  --  111* 106*  --  91 101*    < > = values in this interval not displayed.        Recent Labs   Lab 03/23/21  0

## 2021-04-07 NOTE — DISCHARGE SUMMARY
General Medicine Discharge Summary     Patient ID:  Cassidy Melara  80year old  1/10/1940    Admit date: 3/22/2021    Discharge date and time: 3/27/2021    Attending Physician: No att. providers found     Consults: IP CONSULT TO Lior Hall 750 MG Tabs  Commonly known as: LEVAQUIN  Take 1 tablet (750 mg total) by mouth daily for 7 days.   Ask about: Should I take this medication?     metRONIDAZOLE 500 MG Tabs  Commonly known as: FLAGYL  Take 1 tablet (500 mg total) by mouth 3 (three) times hilda

## 2021-04-12 ENCOUNTER — LAB ENCOUNTER (OUTPATIENT)
Dept: LAB | Age: 81
End: 2021-04-12
Attending: INTERNAL MEDICINE
Payer: MEDICARE

## 2021-04-12 DIAGNOSIS — K85.90 ACUTE PANCREATITIS, UNSPECIFIED COMPLICATION STATUS, UNSPECIFIED PANCREATITIS TYPE: ICD-10-CM

## 2021-05-19 ENCOUNTER — HOSPITAL ENCOUNTER (OUTPATIENT)
Dept: MRI IMAGING | Age: 81
Discharge: HOME | End: 2021-05-19
Attending: INTERNAL MEDICINE
Payer: MEDICARE

## 2021-05-19 DIAGNOSIS — K85.00 IDIOPATHIC ACUTE PANCREATITIS WITHOUT INFECTION OR NECROSIS: ICD-10-CM

## 2021-05-19 DIAGNOSIS — K83.1 BILIARY OBSTRUCTION: ICD-10-CM

## 2021-05-19 DIAGNOSIS — R79.89 ELEVATED LIVER FUNCTION TESTS: ICD-10-CM

## 2021-05-19 PROCEDURE — 74183 MRI ABD W/O CNTR FLWD CNTR: CPT | Performed by: INTERNAL MEDICINE

## 2021-05-19 PROCEDURE — A9575 INJ GADOTERATE MEGLUMI 0.1ML: HCPCS | Performed by: INTERNAL MEDICINE

## 2021-06-09 ENCOUNTER — HOSPITAL ENCOUNTER (INPATIENT)
Facility: HOSPITAL | Age: 81
LOS: 1 days | Discharge: HOME | DRG: 919 | End: 2021-06-11
Attending: EMERGENCY MEDICINE | Admitting: HOSPITALIST
Payer: MEDICARE

## 2021-06-09 DIAGNOSIS — K85.10 ACUTE BILIARY PANCREATITIS, UNSPECIFIED COMPLICATION STATUS: Primary | ICD-10-CM

## 2021-06-09 DIAGNOSIS — T85.520A MIGRATION OF BILIARY STENT, INITIAL ENCOUNTER: ICD-10-CM

## 2021-06-09 PROCEDURE — 36415 COLL VENOUS BLD VENIPUNCTURE: CPT

## 2021-06-09 PROCEDURE — 99285 EMERGENCY DEPT VISIT HI MDM: CPT

## 2021-06-09 PROCEDURE — 93005 ELECTROCARDIOGRAM TRACING: CPT

## 2021-06-09 PROCEDURE — 96361 HYDRATE IV INFUSION ADD-ON: CPT

## 2021-06-09 PROCEDURE — 96375 TX/PRO/DX INJ NEW DRUG ADDON: CPT

## 2021-06-09 PROCEDURE — 93010 ELECTROCARDIOGRAM REPORT: CPT

## 2021-06-09 PROCEDURE — 96365 THER/PROPH/DIAG IV INF INIT: CPT

## 2021-06-09 RX ORDER — ONDANSETRON 4 MG/1
4 TABLET, ORALLY DISINTEGRATING ORAL ONCE
Status: COMPLETED | OUTPATIENT
Start: 2021-06-09 | End: 2021-06-09

## 2021-06-10 ENCOUNTER — APPOINTMENT (OUTPATIENT)
Dept: CT IMAGING | Facility: HOSPITAL | Age: 81
DRG: 919 | End: 2021-06-10
Attending: EMERGENCY MEDICINE
Payer: MEDICARE

## 2021-06-10 ENCOUNTER — APPOINTMENT (OUTPATIENT)
Dept: GENERAL RADIOLOGY | Facility: HOSPITAL | Age: 81
DRG: 919 | End: 2021-06-10
Attending: INTERNAL MEDICINE
Payer: MEDICARE

## 2021-06-10 ENCOUNTER — ANESTHESIA EVENT (OUTPATIENT)
Dept: ENDOSCOPY | Facility: HOSPITAL | Age: 81
DRG: 919 | End: 2021-06-10
Payer: MEDICARE

## 2021-06-10 ENCOUNTER — ANESTHESIA (OUTPATIENT)
Dept: ENDOSCOPY | Facility: HOSPITAL | Age: 81
DRG: 919 | End: 2021-06-10
Payer: MEDICARE

## 2021-06-10 PROBLEM — K85.10 ACUTE BILIARY PANCREATITIS, UNSPECIFIED COMPLICATION STATUS: Status: ACTIVE | Noted: 2021-06-10

## 2021-06-10 PROBLEM — K85.10 ACUTE BILIARY PANCREATITIS: Status: ACTIVE | Noted: 2021-06-10

## 2021-06-10 PROBLEM — K85.10 ACUTE BILIARY PANCREATITIS (HCC): Status: ACTIVE | Noted: 2021-06-10

## 2021-06-10 PROBLEM — T85.520A: Status: ACTIVE | Noted: 2021-06-10

## 2021-06-10 PROBLEM — K85.10 ACUTE BILIARY PANCREATITIS, UNSPECIFIED COMPLICATION STATUS (HCC): Status: ACTIVE | Noted: 2021-06-10

## 2021-06-10 PROCEDURE — 74330 X-RAY BILE/PANC ENDOSCOPY: CPT | Performed by: INTERNAL MEDICINE

## 2021-06-10 PROCEDURE — 80053 COMPREHEN METABOLIC PANEL: CPT | Performed by: EMERGENCY MEDICINE

## 2021-06-10 PROCEDURE — 88104 CYTOPATH FL NONGYN SMEARS: CPT | Performed by: INTERNAL MEDICINE

## 2021-06-10 PROCEDURE — BF111ZZ FLUOROSCOPY OF BILIARY AND PANCREATIC DUCTS USING LOW OSMOLAR CONTRAST: ICD-10-PCS | Performed by: INTERNAL MEDICINE

## 2021-06-10 PROCEDURE — 87040 BLOOD CULTURE FOR BACTERIA: CPT | Performed by: EMERGENCY MEDICINE

## 2021-06-10 PROCEDURE — 83690 ASSAY OF LIPASE: CPT | Performed by: EMERGENCY MEDICINE

## 2021-06-10 PROCEDURE — 74177 CT ABD & PELVIS W/CONTRAST: CPT | Performed by: EMERGENCY MEDICINE

## 2021-06-10 PROCEDURE — 0FC98ZZ EXTIRPATION OF MATTER FROM COMMON BILE DUCT, VIA NATURAL OR ARTIFICIAL OPENING ENDOSCOPIC: ICD-10-PCS | Performed by: INTERNAL MEDICINE

## 2021-06-10 PROCEDURE — 85025 COMPLETE CBC W/AUTO DIFF WBC: CPT | Performed by: EMERGENCY MEDICINE

## 2021-06-10 PROCEDURE — 81001 URINALYSIS AUTO W/SCOPE: CPT | Performed by: EMERGENCY MEDICINE

## 2021-06-10 PROCEDURE — 0FPB8DZ REMOVAL OF INTRALUMINAL DEVICE FROM HEPATOBILIARY DUCT, VIA NATURAL OR ARTIFICIAL OPENING ENDOSCOPIC: ICD-10-PCS | Performed by: INTERNAL MEDICINE

## 2021-06-10 PROCEDURE — 36410 VNPNXR 3YR/> PHY/QHP DX/THER: CPT

## 2021-06-10 PROCEDURE — 76937 US GUIDE VASCULAR ACCESS: CPT

## 2021-06-10 PROCEDURE — 0F798DZ DILATION OF COMMON BILE DUCT WITH INTRALUMINAL DEVICE, VIA NATURAL OR ARTIFICIAL OPENING ENDOSCOPIC: ICD-10-PCS | Performed by: INTERNAL MEDICINE

## 2021-06-10 DEVICE — BILIARY STENT WITH NAVIFLEXTM RX DELIVERY SYSTEM
Type: IMPLANTABLE DEVICE | Status: FUNCTIONAL
Brand: ADVANIX™ BILIARY

## 2021-06-10 RX ORDER — METOCLOPRAMIDE HYDROCHLORIDE 5 MG/ML
5 INJECTION INTRAMUSCULAR; INTRAVENOUS ONCE
Status: COMPLETED | OUTPATIENT
Start: 2021-06-10 | End: 2021-06-10

## 2021-06-10 RX ORDER — LIDOCAINE HYDROCHLORIDE 10 MG/ML
INJECTION, SOLUTION EPIDURAL; INFILTRATION; INTRACAUDAL; PERINEURAL AS NEEDED
Status: DISCONTINUED | OUTPATIENT
Start: 2021-06-10 | End: 2021-06-10 | Stop reason: SURG

## 2021-06-10 RX ORDER — NALOXONE HYDROCHLORIDE 0.4 MG/ML
80 INJECTION, SOLUTION INTRAMUSCULAR; INTRAVENOUS; SUBCUTANEOUS AS NEEDED
Status: CANCELLED | OUTPATIENT
Start: 2021-06-10 | End: 2021-06-11

## 2021-06-10 RX ORDER — MORPHINE SULFATE 2 MG/ML
2 INJECTION, SOLUTION INTRAMUSCULAR; INTRAVENOUS EVERY 2 HOUR PRN
Status: DISCONTINUED | OUTPATIENT
Start: 2021-06-10 | End: 2021-06-11

## 2021-06-10 RX ORDER — ONDANSETRON 2 MG/ML
4 INJECTION INTRAMUSCULAR; INTRAVENOUS EVERY 6 HOURS PRN
Status: DISCONTINUED | OUTPATIENT
Start: 2021-06-10 | End: 2021-06-11

## 2021-06-10 RX ORDER — DEXTROSE MONOHYDRATE 25 G/50ML
50 INJECTION, SOLUTION INTRAVENOUS
Status: CANCELLED | OUTPATIENT
Start: 2021-06-10

## 2021-06-10 RX ORDER — MORPHINE SULFATE 4 MG/ML
4 INJECTION, SOLUTION INTRAMUSCULAR; INTRAVENOUS EVERY 30 MIN PRN
Status: DISCONTINUED | OUTPATIENT
Start: 2021-06-10 | End: 2021-06-10

## 2021-06-10 RX ORDER — MORPHINE SULFATE 4 MG/ML
4 INJECTION, SOLUTION INTRAMUSCULAR; INTRAVENOUS EVERY 2 HOUR PRN
Status: DISCONTINUED | OUTPATIENT
Start: 2021-06-10 | End: 2021-06-11

## 2021-06-10 RX ORDER — SODIUM CHLORIDE, SODIUM LACTATE, POTASSIUM CHLORIDE, CALCIUM CHLORIDE 600; 310; 30; 20 MG/100ML; MG/100ML; MG/100ML; MG/100ML
INJECTION, SOLUTION INTRAVENOUS CONTINUOUS
Status: CANCELLED | OUTPATIENT
Start: 2021-06-10

## 2021-06-10 RX ORDER — MORPHINE SULFATE 2 MG/ML
1 INJECTION, SOLUTION INTRAMUSCULAR; INTRAVENOUS EVERY 2 HOUR PRN
Status: DISCONTINUED | OUTPATIENT
Start: 2021-06-10 | End: 2021-06-11

## 2021-06-10 RX ORDER — ONDANSETRON 2 MG/ML
4 INJECTION INTRAMUSCULAR; INTRAVENOUS ONCE
Status: COMPLETED | OUTPATIENT
Start: 2021-06-10 | End: 2021-06-10

## 2021-06-10 RX ORDER — SODIUM CHLORIDE, SODIUM LACTATE, POTASSIUM CHLORIDE, CALCIUM CHLORIDE 600; 310; 30; 20 MG/100ML; MG/100ML; MG/100ML; MG/100ML
INJECTION, SOLUTION INTRAVENOUS CONTINUOUS
Status: DISCONTINUED | OUTPATIENT
Start: 2021-06-10 | End: 2021-06-11

## 2021-06-10 RX ORDER — SODIUM CHLORIDE, SODIUM LACTATE, POTASSIUM CHLORIDE, CALCIUM CHLORIDE 600; 310; 30; 20 MG/100ML; MG/100ML; MG/100ML; MG/100ML
INJECTION, SOLUTION INTRAVENOUS ONCE
Status: COMPLETED | OUTPATIENT
Start: 2021-06-10 | End: 2021-06-10

## 2021-06-10 RX ORDER — PHENYLEPHRINE HCL 10 MG/ML
VIAL (ML) INJECTION AS NEEDED
Status: DISCONTINUED | OUTPATIENT
Start: 2021-06-10 | End: 2021-06-10 | Stop reason: SURG

## 2021-06-10 RX ORDER — METOCLOPRAMIDE HYDROCHLORIDE 5 MG/ML
10 INJECTION INTRAMUSCULAR; INTRAVENOUS EVERY 8 HOURS PRN
Status: DISCONTINUED | OUTPATIENT
Start: 2021-06-10 | End: 2021-06-11

## 2021-06-10 RX ADMIN — SODIUM CHLORIDE, SODIUM LACTATE, POTASSIUM CHLORIDE, CALCIUM CHLORIDE: 600; 310; 30; 20 INJECTION, SOLUTION INTRAVENOUS at 17:44:00

## 2021-06-10 RX ADMIN — PHENYLEPHRINE HCL 50 MCG: 10 MG/ML VIAL (ML) INJECTION at 18:12:00

## 2021-06-10 RX ADMIN — LIDOCAINE HYDROCHLORIDE 40 MG: 10 INJECTION, SOLUTION EPIDURAL; INFILTRATION; INTRACAUDAL; PERINEURAL at 17:49:00

## 2021-06-10 NOTE — PROGRESS NOTES
NURSING ADMISSION NOTE      Patient admitted via Cart  Oriented to room. Safety precautions initiated. Bed in low position. Call light in reach. Admission navigator/PTA med list complete. Pt A&Ox4. Room air. VSS.  C/o of abdominal discomfort and n

## 2021-06-10 NOTE — ED INITIAL ASSESSMENT (HPI)
Onset at 1p with nausea/vomiting. +Abdominal pain, burning. Denies diarrhea, she had a soft stool today. Denies fevers. +Urinary frequency. Denies obvious blood in the vomit.

## 2021-06-10 NOTE — H&P
.  CC: Patient presents with:  Nausea/Vomiting/Diarrhea  Abdomen/Flank Pain       PCP: Akira Gray MD    History of Present Illness: Patient is a 80year old female with PMH sig for hypothyroidism, h/o pancreatitis s/p biliary stent.  Pt had prev 6/6/2016    Procedure: CERVICAL EPIDURAL;  Surgeon: Caroline Belcher MD;  Location: [address] [place] [address]   • NEEDLE BIOPSY LEFT  6/2012   • OTHER Right     achilles tendon repair /foot sx x 2   • OTHER  11/9/2016    ANTERIOR CERVICAL DISCECTOMY AN every other day., Disp: , Rfl:   Calcium Carbonate-Vitamin D (CALCIUM 600+D) 600-200 MG-UNIT Oral Tab, Take 1 tablet by mouth daily. , Disp: , Rfl:   Multiple Vitamin (TAB-A-REENA) Oral Tab, Take 1 tablet by mouth daily. , Disp: , Rfl:           [facility]  [address] pancreatitis without infection or necrosis  TECHNIQUE:  Multiplanar single shot fast spin echo, chemical shift imaging, breath hold T2 weighted images and 2-D fiesta sequences were acquired.  Fluid sensitive imaging with MRCP reconstruction was also perform described above. BOWEL/MESENTERY:  Otherwise unremarkable. ABDOMINAL WALL:  No mass or hernia. BONES:  No bony lesion or fracture. LUNG BASES:  No visible pleural disease. Lung bases not well assessed with MRI. OTHER:  Negative.              CONCLUSION: provided by radiologist from [address] [place] [address]. LIVER:  Unremarkable. BILIARY:  The biliary stent appears low, with the upper aspect within the common bile duct at the level the pancreatic head, most of the stent extending well into the duodenum.   Common npo  - IVF  - IV zosyn    SCDs  **Certification      PHYSICIAN Certification of Need for Inpatient Hospitalization - Initial Certification    Patient will require inpatient services that will reasonably be expected to span two midnight's based on the clini

## 2021-06-10 NOTE — PLAN OF CARE
Problem: Patient/Family Goals  Goal: Patient/Family Long Term Goal  Description: Patient's Long Term Goal: discharge home    Interventions:  - follow poc  - See additional Care Plan goals for specific interventions  Outcome: Progressing  Goal: Patient/Fa Monitor Blood Glucose as ordered  - Assess for signs and symptoms of hyperglycemia and hypoglycemia  - Administer ordered medications to maintain glucose within target range  - Assess barriers to adequate nutritional intake and initiate nutrition consult a side effects  - Notify MD/LIP if interventions unsuccessful or patient reports new pain  - Anticipate increased pain with activity and pre-medicate as appropriate  Outcome: Progressing

## 2021-06-10 NOTE — ED PROVIDER NOTES
Patient Seen in: [facility] Emergency Department      History   Patient presents with:  Nausea/Vomiting/Diarrhea  Abdomen/Flank Pain    Stated Complaint: n/v since 1300 this afternoon    HPI/Subjective:   HPI     51-year-old female presents for eval EPIDURAL/SUBARACHNOID; CERVICAL/THORACIC N/A 6/6/2016    Procedure: CERVICAL EPIDURAL;  Surgeon: Bekah Swanson MD;  Location: [facility] FOR PAIN MANAGEMENT   • NEEDLE BIOPSY LEFT  6/2012   • OTHER Right     achilles tendon repair /foot sx x 2   • OTHER Never used    Alcohol use: Yes      Comment: ONE DRINK A MONTH    Drug use: No             Review of Systems    Positive for stated complaint: n/v since 1300 this afternoon  Other systems are as noted in HPI. Constitutional and vital signs reviewed. the following components:    WBC 12.0 (*)     Neutrophil Absolute Prelim 11.55 (*)     Neutrophil Absolute 11.55 (*)     Lymphocyte Absolute 0.22 (*)     All other components within normal limits   CBC WITH DIFFERENTIAL WITH PLATELET    Narrative:      The ICD-10-CM Noted POA    * (Principal) Acute biliary pancreatitis K85.10 6/10/2021 Unknown

## 2021-06-10 NOTE — ANESTHESIA POSTPROCEDURE EVALUATION
[address] [facility] [address] Patient Status:  Inpatient   Age/Gender 80year old female MRN [id number]   Location [id number] [place] [address] Attending Althea Farnsworth MD   Hosp Day # 0 PCP Brigida Burr MD       Anesthesia

## 2021-06-10 NOTE — CONSULTS
[facility]    Report of Consultation    [facility] Patient Status:  Inpatient    1/10/1940 MRN [id number]   [facility] 5NW-A Attending Bang Preciado MD   Hosp Day # 0 PCP Minerva Greer MD     Date of Admission MANAGEMENT   • INJECTION, W/WO CONTRAST, DX/THERAPEUTIC SUBSTANCE, EPIDURAL/SUBARACHNOID; CERVICAL/THORACIC N/A 5/4/2016    Procedure: CERVICAL EPIDURAL;  Surgeon: Ousmane Joshi MD;  Location: [address] [place] [address]   • INJECTION, W/WO CONTRAST, EPIDURAL;  Surgeon: Johann Torres MD;  Location: [facility] FOR PAIN MANAGEMENT   • SPINE SURGERY PROCEDURE UNLISTED  1/2017    AT C6     Family History   Problem Relation Age of Onset   • Cancer Brother       reports that she has never smoked.  She has n 0.83 06/10/2021    BUN 12 06/10/2021     06/10/2021    K 3.6 06/10/2021     06/10/2021    CO2 26.0 06/10/2021     06/10/2021    CA 9.3 06/10/2021    ALB 3.5 06/10/2021    ALKPHO 182 06/10/2021    BILT 3.7 06/10/2021    TP 8.0 06/10/2021

## 2021-06-10 NOTE — PLAN OF CARE
Problem: Patient/Family Goals  Goal: Patient/Family Long Term Goal  Description: Patient's Long Term Goal: discharge home    Interventions:  - follow poc  - See additional Care Plan goals for specific interventions  Outcome: Progressing  Goal: Patient/Fa ordered  - Assess for signs and symptoms of hyperglycemia and hypoglycemia  - Administer ordered medications to maintain glucose within target range  - Assess barriers to adequate nutritional intake and initiate nutrition consult as needed  - Instruct candy MD/LIP if interventions unsuccessful or patient reports new pain  - Anticipate increased pain with activity and pre-medicate as appropriate  Outcome: Progressing

## 2021-06-10 NOTE — ANESTHESIA PREPROCEDURE EVALUATION
PRE-OP EVALUATION    Patient Name: Leny Martino    Admit Diagnosis: Migration of biliary stent, initial encounter [T85.520A]  Acute biliary pancreatitis, unspecified complication status [E92.07]    Pre-op Diagnosis: BILIARY OBSTRUCTION, PANCREATITIS Carbonate-Vitamin D (CALCIUM 600+D) 600-200 MG-UNIT Oral Tab, Take 1 tablet by mouth daily. , Disp: , Rfl: , 6/9/2021 at 0900  Multiple Vitamin (TAB-A-REENA) Oral Tab, Take 1 tablet by mouth daily. , Disp: , Rfl: , 6/9/2021 at 0900  aspirin 81 MG Oral Tab EC, MANAGEMENT   • NEEDLE BIOPSY LEFT  6/2012   • OTHER Right     achilles tendon repair /foot sx x 2   • OTHER  11/9/2016    ANTERIOR CERVICAL DISCECTOMY AND FUSION OF CERVICAL 4-CERVICAL 5 AND CERVICAL 5- CERVICAL 6 WITH ALLOGRAFT BONE AND MACHINED ALLOY MANDY 06/10/2021    HGB 14.3 06/10/2021    HCT 41.3 06/10/2021    MCV 87.7 06/10/2021    MCH 30.4 06/10/2021    MCHC 34.6 06/10/2021    RDW 13.1 06/10/2021    .0 06/10/2021     Lab Results   Component Value Date     (L) 06/10/2021    K 3.6 06/10/202

## 2021-06-11 VITALS
DIASTOLIC BLOOD PRESSURE: 48 MMHG | WEIGHT: 165 LBS | TEMPERATURE: 99 F | BODY MASS INDEX: 33.26 KG/M2 | OXYGEN SATURATION: 96 % | HEART RATE: 81 BPM | SYSTOLIC BLOOD PRESSURE: 123 MMHG | RESPIRATION RATE: 18 BRPM | HEIGHT: 59 IN

## 2021-06-11 PROCEDURE — 84132 ASSAY OF SERUM POTASSIUM: CPT | Performed by: HOSPITALIST

## 2021-06-11 PROCEDURE — 83735 ASSAY OF MAGNESIUM: CPT | Performed by: HOSPITALIST

## 2021-06-11 PROCEDURE — 80053 COMPREHEN METABOLIC PANEL: CPT | Performed by: HOSPITALIST

## 2021-06-11 PROCEDURE — 85025 COMPLETE CBC W/AUTO DIFF WBC: CPT | Performed by: HOSPITALIST

## 2021-06-11 RX ORDER — MAGNESIUM OXIDE 400 MG (241.3 MG MAGNESIUM) TABLET
400 TABLET ONCE
Status: COMPLETED | OUTPATIENT
Start: 2021-06-11 | End: 2021-06-11

## 2021-06-11 RX ORDER — POTASSIUM CHLORIDE 20 MEQ/1
40 TABLET, EXTENDED RELEASE ORAL EVERY 4 HOURS
Status: COMPLETED | OUTPATIENT
Start: 2021-06-11 | End: 2021-06-11

## 2021-06-11 RX ORDER — ACETAMINOPHEN 500 MG
1000 TABLET ORAL EVERY 6 HOURS PRN
Status: DISCONTINUED | OUTPATIENT
Start: 2021-06-11 | End: 2021-06-11

## 2021-06-11 RX ORDER — ACETAMINOPHEN 500 MG
500 TABLET ORAL EVERY 6 HOURS PRN
Status: DISCONTINUED | OUTPATIENT
Start: 2021-06-11 | End: 2021-06-11

## 2021-06-11 NOTE — OPERATIVE REPORT
[facility] OPERATIVE REPORT   PATIENT NAME: Fatoumata Suarez  MRN: [id number]  DATE OF OPERATION: 6/10/2021  PREOPERATIVE DIAGNOSIS: vomiting, abdominal pain, elevated liver tests, dilated CBD; distally migrated biliary stent  POSTOPERATIVE DIAGNOSI resistance to the passage of the wire across the mid/proximal CBD. The wire was advanced across the stricture into the intrahepatic ducts.   Contrast was injected through a 9 to 12 mm stone extraction balloon revealing a tight stricture within the mid to p

## 2021-06-11 NOTE — PLAN OF CARE
NURSING DISCHARGE NOTE    Discharged Home via Wheelchair. Accompanied by Spouse  Belongings Taken by patient/family. Pt and  educated on discharge information and the need to follow up with Dr. Melvin Pablo within 6 weeks.  Pt educated on pancreat

## 2021-06-11 NOTE — PLAN OF CARE
Pt A&Ox4, maintaining oxygen saturation on RA, no respiratory complaints. NSR on tele. VSS. No c/o pain. Pt c/o nausea today, given PRN zofran and reglan with relief. Pt up with SBA, voids. IVF LR @ 100ml/hr. IV abx.  Extended length PIV to right brachial.

## 2021-06-11 NOTE — PROGRESS NOTES
06/11/21 1022   Clinical Encounter Type   Visited With Health care provider;Patient and family together  (TATUM Liu/ Current code status is \"Prior -Reorder\")   Routine Visit   (Responded to AD consult)    offered supportive listening presence.

## 2021-06-11 NOTE — PLAN OF CARE
Problem: Patient/Family Goals  Goal: Patient/Family Long Term Goal  Description: Patient's Long Term Goal: discharge home    Interventions:  - follow poc  - See additional Care Plan goals for specific interventions  Outcome: Completed  Goal: Patient/Fami INTERVENTIONS:  - Monitor Blood Glucose as ordered  - Assess for signs and symptoms of hyperglycemia and hypoglycemia  - Administer ordered medications to maintain glucose within target range  - Assess barriers to adequate nutritional intake and initiate n for opioid side effects  - Notify MD/LIP if interventions unsuccessful or patient reports new pain  - Anticipate increased pain with activity and pre-medicate as appropriate  Outcome: Completed

## 2021-06-12 NOTE — DISCHARGE SUMMARY
[facility] Discharge Summary    Patient ID:  Enedelia Yeh  [id number]  80year old  1/10/1940    Admit date: 6/9/2021  Discharge date and time: 6/11/21  Attending Physician: Toni Orr MD  Primary Care Physician: Arthur Lackey Tabs  Generic drug: Calcium Carbonate-Vitamin D     Fish Oil 500 MG Caps     Tab-A-Qiana Tabs        STOP taking these medications    aspirin 81 MG Tbec          Consults: IP CONSULT TO GASTROENTEROLOGY  IP CONSULT TO SPIRITUAL CARE  IP CONSULT TO VASCULAR fiesta sequences were acquired. Fluid sensitive imaging with MRCP reconstruction was also performed including 3D multi-projection imaging (non independent workstation). Both projection and source MRCP images are evaluated.   Subsequent post contrast imagin pleural disease. Lung bases not well assessed with MRI. OTHER:  Negative. CONCLUSION:  1.  There is interval decrease in size of peripherally enhancing complex fluid collection along the posterior inferior aspect of the pancreas displacing pan duct at the level the pancreatic head, most of the stent extending well into the duodenum. Common bile duct is dilated measuring on image 26 = 12 mm.   Gallbladder is dilated 4.3 cm with thickened gallbladder wall and slight stranding of the fat around the Coordinating Care: Greater than 30 minutes Patient had opportunity to ask questions and state understand and agree with therapeutic plan as outlined    I reconciled current and discharge medications on day of discharge.   \

## 2021-06-30 ENCOUNTER — OFFICE VISIT (OUTPATIENT)
Dept: SURGERY | Facility: CLINIC | Age: 81
End: 2021-06-30
Payer: MEDICARE

## 2021-06-30 VITALS
DIASTOLIC BLOOD PRESSURE: 68 MMHG | HEIGHT: 59 IN | BODY MASS INDEX: 33.26 KG/M2 | SYSTOLIC BLOOD PRESSURE: 110 MMHG | WEIGHT: 165 LBS

## 2021-06-30 DIAGNOSIS — M79.601 RIGHT ARM PAIN: ICD-10-CM

## 2021-06-30 DIAGNOSIS — Z98.1 S/P CERVICAL SPINAL FUSION: Primary | ICD-10-CM

## 2021-06-30 PROCEDURE — 99203 OFFICE O/P NEW LOW 30 MIN: CPT | Performed by: PHYSICIAN ASSISTANT

## 2021-06-30 PROCEDURE — 1111F DSCHRG MED/CURRENT MED MERGE: CPT | Performed by: PHYSICIAN ASSISTANT

## 2021-06-30 RX ORDER — DICLOFENAC SODIUM 75 MG/1
75 TABLET, DELAYED RELEASE ORAL 2 TIMES DAILY
Qty: 60 TABLET | Refills: 0 | Status: SHIPPED | OUTPATIENT
Start: 2021-06-30 | End: 2021-07-28

## 2021-06-30 NOTE — H&P
Neurosurgery Clinic Visit  2021    [address] PCP:  Carmen Stiles MD    1/10/1940 MRN [id number]       CC:  Right Arm Pain    HPI:    Max Ford is a very pleasant 80year old female who presents with right shoulder pain since an injury Tobacco Use      Smoking status: Never Smoker      Smokeless tobacco: Never Used    Vaping Use      Vaping Use: Never used    Substance and Sexual Activity      Alcohol use: Yes        Comment: ONE DRINK A MONTH      Drug use: No      Sexual activity: Yes Reflexes:    Biceps Brachioradialis Triceps Patellar Ankle Chilango's Clonus   Right 2+ 2+ 2+ 2+ 2+ No No   Left 2+ 2+ 2+ 2+ 2+ No No     A/P:    1. S/P cervical spinal fusion    2.  Right arm pain      Her symptoms are likely secondary to a shoulder in

## 2021-06-30 NOTE — PROGRESS NOTES
Right shoulder  Pain for about a month  Bruised her arm and thought it would go away  Lifted something a little too heavy    Hand is cold, most of the time right hand     Does have a little pain by the shoulder blade  2016 had 2 vertebra replaced with cada

## 2021-07-02 ENCOUNTER — HOSPITAL ENCOUNTER (OUTPATIENT)
Dept: GENERAL RADIOLOGY | Age: 81
Discharge: HOME | End: 2021-07-02
Attending: PHYSICIAN ASSISTANT
Payer: MEDICARE

## 2021-07-02 ENCOUNTER — TELEPHONE (OUTPATIENT)
Dept: SURGERY | Facility: CLINIC | Age: 81
End: 2021-07-02

## 2021-07-02 DIAGNOSIS — M79.601 RIGHT ARM PAIN: ICD-10-CM

## 2021-07-02 DIAGNOSIS — Z98.1 S/P CERVICAL SPINAL FUSION: ICD-10-CM

## 2021-07-02 PROCEDURE — 72052 X-RAY EXAM NECK SPINE 6/>VWS: CPT | Performed by: PHYSICIAN ASSISTANT

## 2021-07-02 NOTE — TELEPHONE ENCOUNTER
Xrays reviewed. The hardware looks stable which is really good news. I think her pain is coming from her shoulder injury and not related to the neck.

## 2021-07-06 NOTE — TELEPHONE ENCOUNTER
Patient returned Nursing's call and information was provided to patient with imaging results and plan moving forward. Patient stated that she will have stent procedure in 3 weeks and then would like to follow up with DOUGLAS Fisher to discuss PT and plan.

## 2021-07-22 PROBLEM — K83.1 BILE DUCT STRICTURE (HCC): Status: ACTIVE | Noted: 2021-07-22

## 2021-07-22 PROBLEM — K83.1 BILE DUCT STRICTURE: Status: ACTIVE | Noted: 2021-07-22

## 2021-07-28 RX ORDER — SODIUM CHLORIDE, SODIUM LACTATE, POTASSIUM CHLORIDE, CALCIUM CHLORIDE 600; 310; 30; 20 MG/100ML; MG/100ML; MG/100ML; MG/100ML
INJECTION, SOLUTION INTRAVENOUS CONTINUOUS
Status: CANCELLED | OUTPATIENT
Start: 2021-07-28

## 2021-08-05 ENCOUNTER — ANESTHESIA (OUTPATIENT)
Dept: ENDOSCOPY | Facility: HOSPITAL | Age: 81
End: 2021-08-05
Payer: MEDICARE

## 2021-08-05 ENCOUNTER — HOSPITAL ENCOUNTER (OUTPATIENT)
Facility: HOSPITAL | Age: 81
Setting detail: HOSPITAL OUTPATIENT SURGERY
Discharge: HOME | End: 2021-08-05
Attending: INTERNAL MEDICINE | Admitting: INTERNAL MEDICINE
Payer: MEDICARE

## 2021-08-05 ENCOUNTER — APPOINTMENT (OUTPATIENT)
Dept: GENERAL RADIOLOGY | Facility: HOSPITAL | Age: 81
End: 2021-08-05
Attending: INTERNAL MEDICINE
Payer: MEDICARE

## 2021-08-05 ENCOUNTER — ANESTHESIA EVENT (OUTPATIENT)
Dept: ENDOSCOPY | Facility: HOSPITAL | Age: 81
End: 2021-08-05
Payer: MEDICARE

## 2021-08-05 VITALS
HEART RATE: 66 BPM | TEMPERATURE: 99 F | HEIGHT: 59 IN | DIASTOLIC BLOOD PRESSURE: 70 MMHG | RESPIRATION RATE: 18 BRPM | BODY MASS INDEX: 33.06 KG/M2 | SYSTOLIC BLOOD PRESSURE: 92 MMHG | OXYGEN SATURATION: 98 % | WEIGHT: 164 LBS

## 2021-08-05 DIAGNOSIS — R79.89 ELEVATED LIVER FUNCTION TESTS: ICD-10-CM

## 2021-08-05 DIAGNOSIS — K85.00 IDIOPATHIC ACUTE PANCREATITIS WITHOUT INFECTION OR NECROSIS: ICD-10-CM

## 2021-08-05 DIAGNOSIS — K83.1 BILIARY OBSTRUCTION: ICD-10-CM

## 2021-08-05 PROCEDURE — 88305 TISSUE EXAM BY PATHOLOGIST: CPT | Performed by: INTERNAL MEDICINE

## 2021-08-05 PROCEDURE — 0FC98ZZ EXTIRPATION OF MATTER FROM COMMON BILE DUCT, VIA NATURAL OR ARTIFICIAL OPENING ENDOSCOPIC: ICD-10-PCS | Performed by: INTERNAL MEDICINE

## 2021-08-05 PROCEDURE — 88172 CYTP DX EVAL FNA 1ST EA SITE: CPT | Performed by: INTERNAL MEDICINE

## 2021-08-05 PROCEDURE — 88173 CYTOPATH EVAL FNA REPORT: CPT | Performed by: INTERNAL MEDICINE

## 2021-08-05 PROCEDURE — 74328 X-RAY BILE DUCT ENDOSCOPY: CPT | Performed by: INTERNAL MEDICINE

## 2021-08-05 PROCEDURE — 88104 CYTOPATH FL NONGYN SMEARS: CPT | Performed by: INTERNAL MEDICINE

## 2021-08-05 PROCEDURE — 079B8ZX DRAINAGE OF MESENTERIC LYMPHATIC, VIA NATURAL OR ARTIFICIAL OPENING ENDOSCOPIC APPROACH, DIAGNOSTIC: ICD-10-PCS | Performed by: INTERNAL MEDICINE

## 2021-08-05 PROCEDURE — 0F798DZ DILATION OF COMMON BILE DUCT WITH INTRALUMINAL DEVICE, VIA NATURAL OR ARTIFICIAL OPENING ENDOSCOPIC: ICD-10-PCS | Performed by: INTERNAL MEDICINE

## 2021-08-05 PROCEDURE — 0FPB8DZ REMOVAL OF INTRALUMINAL DEVICE FROM HEPATOBILIARY DUCT, VIA NATURAL OR ARTIFICIAL OPENING ENDOSCOPIC: ICD-10-PCS | Performed by: INTERNAL MEDICINE

## 2021-08-05 PROCEDURE — 0F988ZX DRAINAGE OF CYSTIC DUCT, VIA NATURAL OR ARTIFICIAL OPENING ENDOSCOPIC, DIAGNOSTIC: ICD-10-PCS | Performed by: INTERNAL MEDICINE

## 2021-08-05 DEVICE — BILIARY STENT WITH NAVIFLEXTM RX DELIVERY SYSTEM
Type: IMPLANTABLE DEVICE | Site: BILIARY | Status: FUNCTIONAL
Brand: ADVANIX™ BILIARY

## 2021-08-05 RX ORDER — HYDROMORPHONE HYDROCHLORIDE 1 MG/ML
0.4 INJECTION, SOLUTION INTRAMUSCULAR; INTRAVENOUS; SUBCUTANEOUS EVERY 5 MIN PRN
Status: DISCONTINUED | OUTPATIENT
Start: 2021-08-05 | End: 2021-08-05

## 2021-08-05 RX ORDER — LEVOFLOXACIN 5 MG/ML
INJECTION, SOLUTION INTRAVENOUS
Status: DISCONTINUED
Start: 2021-08-05 | End: 2021-08-05

## 2021-08-05 RX ORDER — METOCLOPRAMIDE HYDROCHLORIDE 5 MG/ML
10 INJECTION INTRAMUSCULAR; INTRAVENOUS AS NEEDED
Status: DISCONTINUED | OUTPATIENT
Start: 2021-08-05 | End: 2021-08-05

## 2021-08-05 RX ORDER — NALOXONE HYDROCHLORIDE 0.4 MG/ML
80 INJECTION, SOLUTION INTRAMUSCULAR; INTRAVENOUS; SUBCUTANEOUS AS NEEDED
Status: DISCONTINUED | OUTPATIENT
Start: 2021-08-05 | End: 2021-08-05

## 2021-08-05 RX ORDER — LEVOFLOXACIN 5 MG/ML
500 INJECTION, SOLUTION INTRAVENOUS ONCE
Status: COMPLETED | OUTPATIENT
Start: 2021-08-05 | End: 2021-08-05

## 2021-08-05 RX ORDER — HYDROCODONE BITARTRATE AND ACETAMINOPHEN 10; 325 MG/1; MG/1
1 TABLET ORAL AS NEEDED
Status: DISCONTINUED | OUTPATIENT
Start: 2021-08-05 | End: 2021-08-05

## 2021-08-05 RX ORDER — SODIUM CHLORIDE, SODIUM LACTATE, POTASSIUM CHLORIDE, CALCIUM CHLORIDE 600; 310; 30; 20 MG/100ML; MG/100ML; MG/100ML; MG/100ML
INJECTION, SOLUTION INTRAVENOUS CONTINUOUS
Status: DISCONTINUED | OUTPATIENT
Start: 2021-08-05 | End: 2021-08-05

## 2021-08-05 RX ORDER — HYDROCODONE BITARTRATE AND ACETAMINOPHEN 10; 325 MG/1; MG/1
2 TABLET ORAL AS NEEDED
Status: DISCONTINUED | OUTPATIENT
Start: 2021-08-05 | End: 2021-08-05

## 2021-08-05 RX ORDER — PHENYLEPHRINE HCL 10 MG/ML
VIAL (ML) INJECTION AS NEEDED
Status: DISCONTINUED | OUTPATIENT
Start: 2021-08-05 | End: 2021-08-05 | Stop reason: SURG

## 2021-08-05 RX ORDER — ONDANSETRON 2 MG/ML
INJECTION INTRAMUSCULAR; INTRAVENOUS
Status: DISCONTINUED
Start: 2021-08-05 | End: 2021-08-05

## 2021-08-05 RX ORDER — LIDOCAINE HYDROCHLORIDE 10 MG/ML
INJECTION, SOLUTION EPIDURAL; INFILTRATION; INTRACAUDAL; PERINEURAL AS NEEDED
Status: DISCONTINUED | OUTPATIENT
Start: 2021-08-05 | End: 2021-08-05 | Stop reason: SURG

## 2021-08-05 RX ORDER — ONDANSETRON 2 MG/ML
4 INJECTION INTRAMUSCULAR; INTRAVENOUS AS NEEDED
Status: DISCONTINUED | OUTPATIENT
Start: 2021-08-05 | End: 2021-08-05

## 2021-08-05 RX ADMIN — LIDOCAINE HYDROCHLORIDE 25 MG: 10 INJECTION, SOLUTION EPIDURAL; INFILTRATION; INTRACAUDAL; PERINEURAL at 08:35:00

## 2021-08-05 RX ADMIN — LEVOFLOXACIN 500 MG: 5 INJECTION, SOLUTION INTRAVENOUS at 08:34:00

## 2021-08-05 RX ADMIN — SODIUM CHLORIDE, SODIUM LACTATE, POTASSIUM CHLORIDE, CALCIUM CHLORIDE: 600; 310; 30; 20 INJECTION, SOLUTION INTRAVENOUS at 08:37:00

## 2021-08-05 RX ADMIN — PHENYLEPHRINE HCL 100 MCG: 10 MG/ML VIAL (ML) INJECTION at 08:47:00

## 2021-08-05 RX ADMIN — SODIUM CHLORIDE, SODIUM LACTATE, POTASSIUM CHLORIDE, CALCIUM CHLORIDE: 600; 310; 30; 20 INJECTION, SOLUTION INTRAVENOUS at 10:04:00

## 2021-08-05 NOTE — ANESTHESIA PREPROCEDURE EVALUATION
PRE-OP EVALUATION    Patient Name: James Simon    Admit Diagnosis: Idiopathic acute pancreatitis without infection or necrosis [K85.00]  Biliary obstruction [K83.1]  Elevated liver function tests [R79.89]    Pre-op Diagnosis: Idiopathic acute pancr 2019   • COLONOSCOPY  5/20/2014    Procedure: COLONOSCOPY;  Surgeon: Alfredo Roper MD;  Location: [address] [place] [address] ENDOSCOPY   • ERCP,DIAGNOSTIC  03/23/2021    Endoscopic Retrograde Cholangiopancreatoscopy / EUS   • HYSTERECTOMY     • INJECTION, W/WO CONTRAST, DX/THER EPIDURAL;  Surgeon: Cristal Salazar MD;  Location: [facility] PAIN [facility]   • PATIENT [address] FOR IV ANTIBIOTIC SURGICAL SITE INFECTION PROPHYLAXIS.  N/A 5/4/2016    Procedure: CERVICAL EPIDURAL;  Surgeon: Cristal Salazar MD;  Boubacar Delgadillo

## 2021-08-05 NOTE — ANESTHESIA POSTPROCEDURE EVALUATION
[address] [facility] [address] Patient Status:  Hospital Outpatient Surgery   Age/Gender 80year old female MRN [id number]   Location [address] [place] [address] Attending Hansa Cox MD   Hosp Day # 0 PCP Alistair Reyes MD

## 2021-08-05 NOTE — OPERATIVE REPORT
[facility] OPERATIVE REPORT   PATIENT NAME: Enedelia Yeh  MRN: [id number]  DATE OF OPERATION: 8/5/2021  PREOPERATIVE DIAGNOSIS: Patient here for replacement of biliary stent placed previously in March; history of idiopathic pancreatitis with pse obtained. The patient was seen in clinic and a full H&P was obtained. On brief physical examination, airway is patent. Chest is clear. Heart has regular rate and rhythm. Abdomen is soft, nontender with good bowel sounds.  A medication list was taken by nurs the darker ventral enlage. PD was here seen going to the body of the pancreas once again ruling out pancreas divisum. COMMON BILE DUCT: CBD was interrogated from the duodenal bulb. .  It was prominent with stent coursing through it.   It measured 6.4 mm wa in the distal CBD. Gallbladder and cystic duct filled with contrast.  There was no filling of contrast in the pancreatic duct intentionally. Further balloon sweeps once again showed similar resistance to pulling of the balloon in the mid CBD.   [place]

## 2021-08-05 NOTE — H&P
History & Physical Examination    Patient Name: Fatoumata Suarez  MRN: [id number]  CSN: [id number]  YOB: 1940    Diagnosis: Idiopathic acute pancreatitis without infection or necrosis [K85.00]  Biliary obstruction [K83.1]  Elevated liver fu PRN  metoclopramide (REGLAN) injection 10 mg, 10 mg, Intravenous, PRN  levofloxacin in D5W (LEVAQUIN) 500 MG/100ML IVPB premix, , ,   indomethacin (INDOCIN) 50 MG suppository, , ,   indomethacin (INDOCIN) suppository, , , PRN    lidocaine PF (XYLOCAINE) 1% redirection of the screws at Cervical 6 level, Dr. Santiago Molina   • PATIENT DOCUMENTED NOT TO HAVE EXPERIENCED ANY OF THE FOLLOWING EVENTS N/A 3/29/2016    Procedure: CERVICAL EPIDURAL;  Surgeon: Jeison Samuels MD;  Location: [facility] FOR PAIN MANAGEMENT   • P with plan of care. [ x ] I have reviewed the History and Physical done within the last 30 days. Any changes noted above.     Kira Stein MD  8/5/2021  10:03 AM

## 2021-08-26 NOTE — PROGRESS NOTES
Future Appointments  9/1/2021   3:40 PM    Jackie Collins MD          LIL ONC             [facility]

## 2021-08-30 NOTE — PROGRESS NOTES
Message sent to My chart. Pt able to ambulate independently with standby assist to bathroom for urine sample. RR even and nonlabored, speaking in full sentences.

## 2021-11-01 ENCOUNTER — LAB ENCOUNTER (OUTPATIENT)
Dept: LAB | Age: 81
End: 2021-11-01
Attending: INTERNAL MEDICINE
Payer: MEDICARE

## 2021-11-01 DIAGNOSIS — K83.1 BILE DUCT STRICTURE: ICD-10-CM

## 2021-11-03 ENCOUNTER — APPOINTMENT (OUTPATIENT)
Dept: GENERAL RADIOLOGY | Facility: HOSPITAL | Age: 81
End: 2021-11-03
Attending: INTERNAL MEDICINE
Payer: MEDICARE

## 2021-11-03 ENCOUNTER — ANESTHESIA EVENT (OUTPATIENT)
Dept: ENDOSCOPY | Facility: HOSPITAL | Age: 81
End: 2021-11-03
Payer: MEDICARE

## 2021-11-03 ENCOUNTER — ANESTHESIA (OUTPATIENT)
Dept: ENDOSCOPY | Facility: HOSPITAL | Age: 81
End: 2021-11-03
Payer: MEDICARE

## 2021-11-03 ENCOUNTER — HOSPITAL ENCOUNTER (OUTPATIENT)
Facility: HOSPITAL | Age: 81
Setting detail: HOSPITAL OUTPATIENT SURGERY
Discharge: HOME | End: 2021-11-03
Attending: INTERNAL MEDICINE | Admitting: INTERNAL MEDICINE
Payer: MEDICARE

## 2021-11-03 VITALS
TEMPERATURE: 98 F | RESPIRATION RATE: 18 BRPM | OXYGEN SATURATION: 96 % | HEIGHT: 59 IN | BODY MASS INDEX: 33.26 KG/M2 | DIASTOLIC BLOOD PRESSURE: 76 MMHG | WEIGHT: 165 LBS | HEART RATE: 106 BPM | SYSTOLIC BLOOD PRESSURE: 106 MMHG

## 2021-11-03 DIAGNOSIS — K85.00 IDIOPATHIC ACUTE PANCREATITIS WITHOUT INFECTION OR NECROSIS: ICD-10-CM

## 2021-11-03 DIAGNOSIS — K83.1 BILE DUCT STRICTURE: Primary | ICD-10-CM

## 2021-11-03 PROCEDURE — 74328 X-RAY BILE DUCT ENDOSCOPY: CPT | Performed by: INTERNAL MEDICINE

## 2021-11-03 PROCEDURE — BD47ZZZ ULTRASONOGRAPHY OF GASTROINTESTINAL TRACT: ICD-10-PCS | Performed by: INTERNAL MEDICINE

## 2021-11-03 PROCEDURE — 88172 CYTP DX EVAL FNA 1ST EA SITE: CPT | Performed by: INTERNAL MEDICINE

## 2021-11-03 PROCEDURE — 0FPB8DZ REMOVAL OF INTRALUMINAL DEVICE FROM HEPATOBILIARY DUCT, VIA NATURAL OR ARTIFICIAL OPENING ENDOSCOPIC: ICD-10-PCS | Performed by: INTERNAL MEDICINE

## 2021-11-03 PROCEDURE — 88305 TISSUE EXAM BY PATHOLOGIST: CPT | Performed by: INTERNAL MEDICINE

## 2021-11-03 PROCEDURE — 0FDG8ZX EXTRACTION OF PANCREAS, VIA NATURAL OR ARTIFICIAL OPENING ENDOSCOPIC, DIAGNOSTIC: ICD-10-PCS | Performed by: INTERNAL MEDICINE

## 2021-11-03 PROCEDURE — 88173 CYTOPATH EVAL FNA REPORT: CPT | Performed by: INTERNAL MEDICINE

## 2021-11-03 RX ORDER — GLYCOPYRROLATE 0.2 MG/ML
INJECTION, SOLUTION INTRAMUSCULAR; INTRAVENOUS AS NEEDED
Status: DISCONTINUED | OUTPATIENT
Start: 2021-11-03 | End: 2021-11-03 | Stop reason: SURG

## 2021-11-03 RX ORDER — LEVOFLOXACIN 5 MG/ML
INJECTION, SOLUTION INTRAVENOUS AS NEEDED
Status: DISCONTINUED | OUTPATIENT
Start: 2021-11-03 | End: 2021-11-03 | Stop reason: SURG

## 2021-11-03 RX ORDER — SODIUM CHLORIDE, SODIUM LACTATE, POTASSIUM CHLORIDE, CALCIUM CHLORIDE 600; 310; 30; 20 MG/100ML; MG/100ML; MG/100ML; MG/100ML
INJECTION, SOLUTION INTRAVENOUS CONTINUOUS
Status: DISCONTINUED | OUTPATIENT
Start: 2021-11-03 | End: 2021-11-03

## 2021-11-03 RX ORDER — HYDROMORPHONE HYDROCHLORIDE 1 MG/ML
0.4 INJECTION, SOLUTION INTRAMUSCULAR; INTRAVENOUS; SUBCUTANEOUS EVERY 5 MIN PRN
Status: DISCONTINUED | OUTPATIENT
Start: 2021-11-03 | End: 2021-11-03

## 2021-11-03 RX ORDER — ESMOLOL HYDROCHLORIDE 10 MG/ML
INJECTION INTRAVENOUS AS NEEDED
Status: DISCONTINUED | OUTPATIENT
Start: 2021-11-03 | End: 2021-11-03 | Stop reason: SURG

## 2021-11-03 RX ORDER — LIDOCAINE HYDROCHLORIDE 10 MG/ML
INJECTION, SOLUTION EPIDURAL; INFILTRATION; INTRACAUDAL; PERINEURAL AS NEEDED
Status: DISCONTINUED | OUTPATIENT
Start: 2021-11-03 | End: 2021-11-03 | Stop reason: SURG

## 2021-11-03 RX ORDER — LEVOFLOXACIN 5 MG/ML
INJECTION, SOLUTION INTRAVENOUS
Status: DISCONTINUED
Start: 2021-11-03 | End: 2021-11-03

## 2021-11-03 RX ORDER — ONDANSETRON 2 MG/ML
4 INJECTION INTRAMUSCULAR; INTRAVENOUS AS NEEDED
Status: DISCONTINUED | OUTPATIENT
Start: 2021-11-03 | End: 2021-11-03

## 2021-11-03 RX ORDER — NALOXONE HYDROCHLORIDE 0.4 MG/ML
80 INJECTION, SOLUTION INTRAMUSCULAR; INTRAVENOUS; SUBCUTANEOUS AS NEEDED
Status: DISCONTINUED | OUTPATIENT
Start: 2021-11-03 | End: 2021-11-03

## 2021-11-03 RX ORDER — METOCLOPRAMIDE HYDROCHLORIDE 5 MG/ML
10 INJECTION INTRAMUSCULAR; INTRAVENOUS AS NEEDED
Status: DISCONTINUED | OUTPATIENT
Start: 2021-11-03 | End: 2021-11-03

## 2021-11-03 RX ADMIN — LIDOCAINE HYDROCHLORIDE 50 MG: 10 INJECTION, SOLUTION EPIDURAL; INFILTRATION; INTRACAUDAL; PERINEURAL at 08:28:00

## 2021-11-03 RX ADMIN — GLYCOPYRROLATE 0.4 MG: 0.2 INJECTION, SOLUTION INTRAMUSCULAR; INTRAVENOUS at 08:25:00

## 2021-11-03 RX ADMIN — ESMOLOL HYDROCHLORIDE 30 MG: 10 INJECTION INTRAVENOUS at 08:59:00

## 2021-11-03 RX ADMIN — ESMOLOL HYDROCHLORIDE 30 MG: 10 INJECTION INTRAVENOUS at 09:05:00

## 2021-11-03 RX ADMIN — LEVOFLOXACIN 500 MG: 5 INJECTION, SOLUTION INTRAVENOUS at 08:30:00

## 2021-11-03 RX ADMIN — LIDOCAINE HYDROCHLORIDE 50 MG: 10 INJECTION, SOLUTION EPIDURAL; INFILTRATION; INTRACAUDAL; PERINEURAL at 08:32:00

## 2021-11-03 RX ADMIN — SODIUM CHLORIDE, SODIUM LACTATE, POTASSIUM CHLORIDE, CALCIUM CHLORIDE: 600; 310; 30; 20 INJECTION, SOLUTION INTRAVENOUS at 09:12:00

## 2021-11-03 NOTE — H&P
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  N/A 6/6/2016    Procedure: CERVICAL EPIDURAL;  Surgeon: Khushi Khan MD;  Location: [address] [place] [address]   • SPINE SURGERY PROCEDURE UNLISTED  1/2017    AT C6     Family History   Problem Relatio

## 2021-11-03 NOTE — OPERATIVE REPORT
[facility] OPERATIVE REPORT   PATIENT NAME: Johnathan Fregoso  MRN: [id number]  DATE OF OPERATION: 11/3/2021  PREOPERATIVE DIAGNOSIS:   - Patient here for possible removal of biliary stent placed previously in March; last ERCP showed resolution of bi neoplasm  PROCEDURE PERFORMED: ENDOSCOPIC ultrasound with FNA/ ERCP with cholangioscopy with spyglass DS and removal of biliary stent  SEDATION MEDICATIONS: MAC  PREPROCEDURE ASSESSMENT: The indication for this procedure is to assess for pancreatic mass an confluence excluding the possibility of pancreas divisum. The head of pancreas was normal without mass or cyst.  Scope was advanced to the 2nd duodenum. The Aorta and IVC were visualized.   The pancreatic uncinate was visualized and was normal.  Both CBD 035 guidewire was used to cannulate the common bile duct easily. Cholangioscopy was performed starting from the hepatic hilum and a slow pull technique was used. No obvious mass lesions were seen within the common bile duct.   The area of stricturing was

## 2021-11-03 NOTE — ANESTHESIA POSTPROCEDURE EVALUATION
[address] Patient Status:  Hospital Outpatient Surgery   Age/Gender 80year old female MRN [id number]   Location [id number] [place] [address] Attending Harry Enrique MD   Hosp Day # 0 PCP Indira Pearce MD

## 2021-11-03 NOTE — ANESTHESIA PREPROCEDURE EVALUATION
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 Jarrod Ly   • PATIENT DOCUMENTED NOT TO Malia GALARZA/ Pulmonary  Comment: Unlabored ventilatory effort, no retractions. Pulmonary exam normal.                 Other findings            ASA: 2   Plan: MAC  NPO status verified and patient meets guidelines.         Comment: Plan is MAC anesthesia, which may incl

## 2021-11-12 NOTE — PROGRESS NOTES
Test results letter with MD's comments/recommendations pended and routed to [facility] pool to be mailed. No future appointments. Nothing further needed from MD or nurse. Closing encounter.

## 2022-04-08 ENCOUNTER — LAB ENCOUNTER (OUTPATIENT)
Dept: LAB | Age: 82
End: 2022-04-08
Attending: DERMATOLOGY
Payer: MEDICARE

## 2022-04-08 DIAGNOSIS — D48.5 NEOPLASM OF UNCERTAIN BEHAVIOR OF SKIN: ICD-10-CM

## 2022-04-08 LAB
ALBUMIN SERPL-MCNC: 3.5 G/DL (ref 3.4–5)
ALBUMIN/GLOB SERPL: 1.2 {RATIO} (ref 1–2)
ALP LIVER SERPL-CCNC: 61 U/L
ALT SERPL-CCNC: 23 U/L
ANION GAP SERPL CALC-SCNC: 4 MMOL/L (ref 0–18)
AST SERPL-CCNC: 22 U/L (ref 15–37)
BASOPHILS # BLD AUTO: 0.1 X10(3) UL (ref 0–0.2)
BASOPHILS NFR BLD AUTO: 1.3 %
BILIRUB SERPL-MCNC: 0.8 MG/DL (ref 0.1–2)
BUN BLD-MCNC: 15 MG/DL (ref 7–18)
CALCIUM BLD-MCNC: 8.8 MG/DL (ref 8.5–10.1)
CHLORIDE SERPL-SCNC: 110 MMOL/L (ref 98–112)
CO2 SERPL-SCNC: 27 MMOL/L (ref 21–32)
CREAT BLD-MCNC: 0.71 MG/DL
EOSINOPHIL # BLD AUTO: 2.87 X10(3) UL (ref 0–0.7)
EOSINOPHIL NFR BLD AUTO: 36.3 %
ERYTHROCYTE [DISTWIDTH] IN BLOOD BY AUTOMATED COUNT: 14.4 %
FASTING STATUS PATIENT QL REPORTED: YES
GLOBULIN PLAS-MCNC: 3 G/DL (ref 2.8–4.4)
GLUCOSE BLD-MCNC: 92 MG/DL (ref 70–99)
HCT VFR BLD AUTO: 41.4 %
HGB BLD-MCNC: 13.2 G/DL
IMM GRANULOCYTES # BLD AUTO: 0.04 X10(3) UL (ref 0–1)
IMM GRANULOCYTES NFR BLD: 0.5 %
IRON SERPL-MCNC: 120 UG/DL
LYMPHOCYTES # BLD AUTO: 0.8 X10(3) UL (ref 1–4)
LYMPHOCYTES NFR BLD AUTO: 10.1 %
MCH RBC QN AUTO: 31 PG (ref 26–34)
MCHC RBC AUTO-ENTMCNC: 31.9 G/DL (ref 31–37)
MCV RBC AUTO: 97.2 FL
MONOCYTES # BLD AUTO: 0.54 X10(3) UL (ref 0.1–1)
MONOCYTES NFR BLD AUTO: 6.8 %
NEUTROPHILS # BLD AUTO: 3.55 X10 (3) UL (ref 1.5–7.7)
NEUTROPHILS # BLD AUTO: 3.55 X10(3) UL (ref 1.5–7.7)
NEUTROPHILS NFR BLD AUTO: 45 %
OSMOLALITY SERPL CALC.SUM OF ELEC: 292 MOSM/KG (ref 275–295)
PLATELET # BLD AUTO: 207 10(3)UL (ref 150–450)
POTASSIUM SERPL-SCNC: 4 MMOL/L (ref 3.5–5.1)
PROT SERPL-MCNC: 6.5 G/DL (ref 6.4–8.2)
RBC # BLD AUTO: 4.26 X10(6)UL
SODIUM SERPL-SCNC: 141 MMOL/L (ref 136–145)
TSI SER-ACNC: 3.74 MIU/ML (ref 0.36–3.74)
VIT D+METAB SERPL-MCNC: 27.3 NG/ML (ref 30–100)
WBC # BLD AUTO: 7.9 X10(3) UL (ref 4–11)

## 2022-04-08 PROCEDURE — 85025 COMPLETE CBC W/AUTO DIFF WBC: CPT

## 2022-04-08 PROCEDURE — 80053 COMPREHEN METABOLIC PANEL: CPT

## 2022-04-08 PROCEDURE — 36415 COLL VENOUS BLD VENIPUNCTURE: CPT

## 2022-04-08 PROCEDURE — 83540 ASSAY OF IRON: CPT

## 2022-04-08 PROCEDURE — 82306 VITAMIN D 25 HYDROXY: CPT

## 2022-04-08 PROCEDURE — 84443 ASSAY THYROID STIM HORMONE: CPT

## 2022-04-15 ENCOUNTER — WALK IN (OUTPATIENT)
Dept: URGENT CARE | Age: 82
End: 2022-04-15

## 2022-04-15 VITALS
HEART RATE: 85 BPM | HEIGHT: 59 IN | WEIGHT: 160 LBS | BODY MASS INDEX: 32.25 KG/M2 | SYSTOLIC BLOOD PRESSURE: 124 MMHG | DIASTOLIC BLOOD PRESSURE: 72 MMHG | RESPIRATION RATE: 18 BRPM | OXYGEN SATURATION: 96 % | TEMPERATURE: 98.9 F

## 2022-04-15 DIAGNOSIS — H61.21 IMPACTED CERUMEN OF RIGHT EAR: Primary | ICD-10-CM

## 2022-04-15 PROCEDURE — 99202 OFFICE O/P NEW SF 15 MIN: CPT | Performed by: NURSE PRACTITIONER

## 2022-04-15 RX ORDER — OMEGA-3/DHA/EPA/FISH OIL 60 MG-90MG
500 CAPSULE ORAL
COMMUNITY

## 2022-04-15 ASSESSMENT — ENCOUNTER SYMPTOMS
SORE THROAT: 0
SINUS PRESSURE: 0
SINUS PAIN: 0
RHINORRHEA: 0
RESPIRATORY NEGATIVE: 1
CONSTITUTIONAL NEGATIVE: 1
TROUBLE SWALLOWING: 0
COUGH: 0

## 2022-04-15 ASSESSMENT — PAIN SCALES - GENERAL: PAINLEVEL: 0

## 2022-05-06 ENCOUNTER — HOSPITAL ENCOUNTER (OUTPATIENT)
Dept: MRI IMAGING | Facility: HOSPITAL | Age: 82
Discharge: HOME | End: 2022-05-06
Attending: INTERNAL MEDICINE
Payer: MEDICARE

## 2022-05-06 DIAGNOSIS — R79.89 ELEVATED LIVER FUNCTION TESTS: ICD-10-CM

## 2022-05-06 DIAGNOSIS — K85.00 IDIOPATHIC ACUTE PANCREATITIS WITHOUT INFECTION OR NECROSIS: ICD-10-CM

## 2022-05-06 DIAGNOSIS — K83.1 BILIARY OBSTRUCTION: ICD-10-CM

## 2022-05-06 PROCEDURE — 76376 3D RENDER W/INTRP POSTPROCES: CPT | Performed by: INTERNAL MEDICINE

## 2022-05-06 PROCEDURE — A9575 INJ GADOTERATE MEGLUMI 0.1ML: HCPCS | Performed by: INTERNAL MEDICINE

## 2022-05-06 PROCEDURE — 74183 MRI ABD W/O CNTR FLWD CNTR: CPT | Performed by: INTERNAL MEDICINE

## 2022-05-19 ENCOUNTER — HOSPITAL ENCOUNTER (OUTPATIENT)
Facility: HOSPITAL | Age: 82
Setting detail: HOSPITAL OUTPATIENT SURGERY
Discharge: HOME | End: 2022-05-19
Attending: INTERNAL MEDICINE | Admitting: INTERNAL MEDICINE
Payer: MEDICARE

## 2022-05-19 ENCOUNTER — APPOINTMENT (OUTPATIENT)
Dept: GENERAL RADIOLOGY | Facility: HOSPITAL | Age: 82
End: 2022-05-19
Attending: INTERNAL MEDICINE
Payer: MEDICARE

## 2022-05-19 ENCOUNTER — ANESTHESIA EVENT (OUTPATIENT)
Dept: ENDOSCOPY | Facility: HOSPITAL | Age: 82
End: 2022-05-19
Payer: MEDICARE

## 2022-05-19 ENCOUNTER — ANESTHESIA (OUTPATIENT)
Dept: ENDOSCOPY | Facility: HOSPITAL | Age: 82
End: 2022-05-19
Payer: MEDICARE

## 2022-05-19 VITALS
OXYGEN SATURATION: 100 % | TEMPERATURE: 98 F | DIASTOLIC BLOOD PRESSURE: 56 MMHG | SYSTOLIC BLOOD PRESSURE: 108 MMHG | BODY MASS INDEX: 34.27 KG/M2 | RESPIRATION RATE: 16 BRPM | HEIGHT: 59 IN | HEART RATE: 64 BPM | WEIGHT: 170 LBS

## 2022-05-19 DIAGNOSIS — Z20.822 ENCOUNTER FOR PREOPERATIVE SCREENING LABORATORY TESTING FOR COVID-19 VIRUS: Primary | ICD-10-CM

## 2022-05-19 DIAGNOSIS — Z01.812 ENCOUNTER FOR PREOPERATIVE SCREENING LABORATORY TESTING FOR COVID-19 VIRUS: Primary | ICD-10-CM

## 2022-05-19 DIAGNOSIS — K83.1 BILE DUCT STRICTURE: ICD-10-CM

## 2022-05-19 PROCEDURE — 88172 CYTP DX EVAL FNA 1ST EA SITE: CPT | Performed by: INTERNAL MEDICINE

## 2022-05-19 PROCEDURE — 88305 TISSUE EXAM BY PATHOLOGIST: CPT | Performed by: INTERNAL MEDICINE

## 2022-05-19 PROCEDURE — 0FJB8ZZ INSPECTION OF HEPATOBILIARY DUCT, VIA NATURAL OR ARTIFICIAL OPENING ENDOSCOPIC: ICD-10-PCS | Performed by: INTERNAL MEDICINE

## 2022-05-19 PROCEDURE — 0FB04ZX EXCISION OF LIVER, PERCUTANEOUS ENDOSCOPIC APPROACH, DIAGNOSTIC: ICD-10-PCS | Performed by: INTERNAL MEDICINE

## 2022-05-19 PROCEDURE — 88173 CYTOPATH EVAL FNA REPORT: CPT | Performed by: INTERNAL MEDICINE

## 2022-05-19 PROCEDURE — 74328 X-RAY BILE DUCT ENDOSCOPY: CPT | Performed by: INTERNAL MEDICINE

## 2022-05-19 RX ORDER — SODIUM CHLORIDE, SODIUM LACTATE, POTASSIUM CHLORIDE, CALCIUM CHLORIDE 600; 310; 30; 20 MG/100ML; MG/100ML; MG/100ML; MG/100ML
INJECTION, SOLUTION INTRAVENOUS CONTINUOUS
Status: DISCONTINUED | OUTPATIENT
Start: 2022-05-19 | End: 2022-05-19

## 2022-05-19 RX ORDER — LIDOCAINE HYDROCHLORIDE 10 MG/ML
INJECTION, SOLUTION EPIDURAL; INFILTRATION; INTRACAUDAL; PERINEURAL AS NEEDED
Status: DISCONTINUED | OUTPATIENT
Start: 2022-05-19 | End: 2022-05-19 | Stop reason: SURG

## 2022-05-19 RX ORDER — ONDANSETRON 2 MG/ML
4 INJECTION INTRAMUSCULAR; INTRAVENOUS AS NEEDED
Status: DISCONTINUED | OUTPATIENT
Start: 2022-05-19 | End: 2022-05-19

## 2022-05-19 RX ORDER — NALOXONE HYDROCHLORIDE 0.4 MG/ML
80 INJECTION, SOLUTION INTRAMUSCULAR; INTRAVENOUS; SUBCUTANEOUS AS NEEDED
Status: DISCONTINUED | OUTPATIENT
Start: 2022-05-19 | End: 2022-05-19

## 2022-05-19 RX ORDER — LEVOFLOXACIN 5 MG/ML
500 INJECTION, SOLUTION INTRAVENOUS ONCE
Status: COMPLETED | OUTPATIENT
Start: 2022-05-19 | End: 2022-05-19

## 2022-05-19 RX ORDER — LEVOFLOXACIN 5 MG/ML
INJECTION, SOLUTION INTRAVENOUS
Status: DISCONTINUED
Start: 2022-05-19 | End: 2022-05-19

## 2022-05-19 RX ADMIN — LEVOFLOXACIN 500 MG: 5 INJECTION, SOLUTION INTRAVENOUS at 08:03:00

## 2022-05-19 RX ADMIN — SODIUM CHLORIDE, SODIUM LACTATE, POTASSIUM CHLORIDE, CALCIUM CHLORIDE: 600; 310; 30; 20 INJECTION, SOLUTION INTRAVENOUS at 08:01:00

## 2022-05-19 RX ADMIN — LIDOCAINE HYDROCHLORIDE 30 MG: 10 INJECTION, SOLUTION EPIDURAL; INFILTRATION; INTRACAUDAL; PERINEURAL at 08:08:00

## 2022-05-19 RX ADMIN — SODIUM CHLORIDE, SODIUM LACTATE, POTASSIUM CHLORIDE, CALCIUM CHLORIDE: 600; 310; 30; 20 INJECTION, SOLUTION INTRAVENOUS at 08:42:00

## 2022-05-19 NOTE — ANESTHESIA POSTPROCEDURE EVALUATION
[address] Patient Status:  Hospital Outpatient Surgery   Age/Gender [de-identified]year old female MRN [id number]   Location [address] [address] Attending Niyah Perez MD   Hosp Day # 0 PCP Mateo Mayers MD       Anesthesia Post-op Note    ENDOSCOPIC ULTRASOUND WITH FINE NEEDLE ASPIRATION, ENDOSCOPIC RETROGRADE CHOLANGIOPANCREATOGRAPHY with BALLOON SWEEP    Procedure Summary     Date: 05/19/22 Room / Location: [facility] ENDOSCOPY 03 / [facility] ENDOSCOPY    Anesthesia Start: 0801 Anesthesia Stop: 9840    Procedures:       ENDOSCOPIC ULTRASOUND WITH FINE NEEDLE ASPIRATION, ENDOSCOPIC RETROGRADE CHOLANGIOPANCREATOGRAPHY with BALLOON SWEEP (N/A )      . (N/A ) Diagnosis: (portalhepatis lymphadenopathy)    Surgeons: Niyah Perez MD Anesthesiologist: Judith Mitchell MD    Anesthesia Type: MAC ASA Status: 2          Anesthesia Type: MAC    Vitals Value Taken Time   /75 05/19/22 0843   Temp  05/19/22 0843   Pulse 71 05/19/22 0843   Resp 16 05/19/22 0843   SpO2 97 05/19/22 0843       Patient Location: Endoscopy    Anesthesia Type: MAC    Airway Patency: patent    Postop Pain Control: adequate    Mental Status: mildly sedated but able to meaningfully participate in the post-anesthesia evaluation    Nausea/Vomiting: none    Cardiopulmonary/Hydration status: stable euvolemic    Complications: no apparent anesthesia related complications    Postop vital signs: stable    Dental Exam: Unchanged from Postop

## 2022-05-23 ENCOUNTER — HOSPITAL ENCOUNTER (OUTPATIENT)
Facility: HOSPITAL | Age: 82
Setting detail: OBSERVATION
Discharge: HOME | End: 2022-05-24
Attending: STUDENT IN AN ORGANIZED HEALTH CARE EDUCATION/TRAINING PROGRAM | Admitting: HOSPITALIST
Payer: MEDICARE

## 2022-05-23 ENCOUNTER — APPOINTMENT (OUTPATIENT)
Dept: MRI IMAGING | Facility: HOSPITAL | Age: 82
End: 2022-05-23
Attending: STUDENT IN AN ORGANIZED HEALTH CARE EDUCATION/TRAINING PROGRAM
Payer: MEDICARE

## 2022-05-23 DIAGNOSIS — R42 VERTIGO: Primary | ICD-10-CM

## 2022-05-23 LAB
ANION GAP SERPL CALC-SCNC: 5 MMOL/L (ref 0–18)
ATRIAL RATE: 62 BPM
BASOPHILS # BLD AUTO: 0.05 X10(3) UL (ref 0–0.2)
BASOPHILS NFR BLD AUTO: 1.1 %
BILIRUB UR QL STRIP.AUTO: NEGATIVE
BILIRUB UR QL STRIP.AUTO: NEGATIVE
BUN BLD-MCNC: 16 MG/DL (ref 7–18)
CALCIUM BLD-MCNC: 9.3 MG/DL (ref 8.5–10.1)
CHLORIDE SERPL-SCNC: 107 MMOL/L (ref 98–112)
CLARITY UR REFRACT.AUTO: CLEAR
CLARITY UR REFRACT.AUTO: CLEAR
CO2 SERPL-SCNC: 28 MMOL/L (ref 21–32)
COLOR UR AUTO: YELLOW
COLOR UR AUTO: YELLOW
CREAT BLD-MCNC: 0.7 MG/DL
EOSINOPHIL # BLD AUTO: 0.39 X10(3) UL (ref 0–0.7)
EOSINOPHIL NFR BLD AUTO: 8.5 %
ERYTHROCYTE [DISTWIDTH] IN BLOOD BY AUTOMATED COUNT: 12.8 %
GLUCOSE BLD-MCNC: 111 MG/DL (ref 70–99)
GLUCOSE UR STRIP.AUTO-MCNC: 50 MG/DL
GLUCOSE UR STRIP.AUTO-MCNC: 50 MG/DL
HCT VFR BLD AUTO: 42.9 %
HGB BLD-MCNC: 14.2 G/DL
HYALINE CASTS #/AREA URNS AUTO: PRESENT /LPF
HYALINE CASTS #/AREA URNS AUTO: PRESENT /LPF
IMM GRANULOCYTES # BLD AUTO: 0.03 X10(3) UL (ref 0–1)
IMM GRANULOCYTES NFR BLD: 0.7 %
KETONES UR STRIP.AUTO-MCNC: NEGATIVE MG/DL
KETONES UR STRIP.AUTO-MCNC: NEGATIVE MG/DL
LEUKOCYTE ESTERASE UR QL STRIP.AUTO: NEGATIVE
LEUKOCYTE ESTERASE UR QL STRIP.AUTO: NEGATIVE
LYMPHOCYTES # BLD AUTO: 0.56 X10(3) UL (ref 1–4)
LYMPHOCYTES NFR BLD AUTO: 12.2 %
MCH RBC QN AUTO: 30.2 PG (ref 26–34)
MCHC RBC AUTO-ENTMCNC: 33.1 G/DL (ref 31–37)
MCV RBC AUTO: 91.3 FL
MONOCYTES # BLD AUTO: 0.3 X10(3) UL (ref 0.1–1)
MONOCYTES NFR BLD AUTO: 6.6 %
NEUTROPHILS # BLD AUTO: 3.25 X10 (3) UL (ref 1.5–7.7)
NEUTROPHILS # BLD AUTO: 3.25 X10(3) UL (ref 1.5–7.7)
NEUTROPHILS NFR BLD AUTO: 70.9 %
NITRITE UR QL STRIP.AUTO: NEGATIVE
NITRITE UR QL STRIP.AUTO: NEGATIVE
NON GYNE INTERPRETATION: NEGATIVE
OSMOLALITY SERPL CALC.SUM OF ELEC: 292 MOSM/KG (ref 275–295)
P AXIS: 37 DEGREES
P-R INTERVAL: 180 MS
PH UR STRIP.AUTO: 6 [PH] (ref 5–8)
PH UR STRIP.AUTO: 6 [PH] (ref 5–8)
PLATELET # BLD AUTO: 243 10(3)UL (ref 150–450)
POTASSIUM SERPL-SCNC: 3.7 MMOL/L (ref 3.5–5.1)
PROT UR STRIP.AUTO-MCNC: NEGATIVE MG/DL
PROT UR STRIP.AUTO-MCNC: NEGATIVE MG/DL
Q-T INTERVAL: 476 MS
QRS DURATION: 98 MS
QTC CALCULATION (BEZET): 483 MS
R AXIS: 18 DEGREES
RBC # BLD AUTO: 4.7 X10(6)UL
RBC #/AREA URNS AUTO: >10 /HPF
RBC #/AREA URNS AUTO: >10 /HPF
SARS-COV-2 RNA RESP QL NAA+PROBE: NOT DETECTED
SODIUM SERPL-SCNC: 140 MMOL/L (ref 136–145)
SP GR UR STRIP.AUTO: 1.01 (ref 1–1.03)
SP GR UR STRIP.AUTO: 1.01 (ref 1–1.03)
T AXIS: 29 DEGREES
TROPONIN I HIGH SENSITIVITY: 11 NG/L
UROBILINOGEN UR STRIP.AUTO-MCNC: <2 MG/DL
UROBILINOGEN UR STRIP.AUTO-MCNC: <2 MG/DL
VENTRICULAR RATE: 62 BPM
WBC # BLD AUTO: 4.6 X10(3) UL (ref 4–11)

## 2022-05-23 PROCEDURE — 99285 EMERGENCY DEPT VISIT HI MDM: CPT

## 2022-05-23 PROCEDURE — 93005 ELECTROCARDIOGRAM TRACING: CPT

## 2022-05-23 PROCEDURE — 81001 URINALYSIS AUTO W/SCOPE: CPT | Performed by: STUDENT IN AN ORGANIZED HEALTH CARE EDUCATION/TRAINING PROGRAM

## 2022-05-23 PROCEDURE — 84484 ASSAY OF TROPONIN QUANT: CPT | Performed by: STUDENT IN AN ORGANIZED HEALTH CARE EDUCATION/TRAINING PROGRAM

## 2022-05-23 PROCEDURE — 81001 URINALYSIS AUTO W/SCOPE: CPT | Performed by: HOSPITALIST

## 2022-05-23 PROCEDURE — 85025 COMPLETE CBC W/AUTO DIFF WBC: CPT | Performed by: STUDENT IN AN ORGANIZED HEALTH CARE EDUCATION/TRAINING PROGRAM

## 2022-05-23 PROCEDURE — 70551 MRI BRAIN STEM W/O DYE: CPT | Performed by: STUDENT IN AN ORGANIZED HEALTH CARE EDUCATION/TRAINING PROGRAM

## 2022-05-23 PROCEDURE — 80048 BASIC METABOLIC PNL TOTAL CA: CPT | Performed by: STUDENT IN AN ORGANIZED HEALTH CARE EDUCATION/TRAINING PROGRAM

## 2022-05-23 PROCEDURE — 93010 ELECTROCARDIOGRAM REPORT: CPT

## 2022-05-23 PROCEDURE — 96374 THER/PROPH/DIAG INJ IV PUSH: CPT

## 2022-05-23 RX ORDER — ONDANSETRON 2 MG/ML
4 INJECTION INTRAMUSCULAR; INTRAVENOUS EVERY 6 HOURS PRN
Status: DISCONTINUED | OUTPATIENT
Start: 2022-05-23 | End: 2022-05-24

## 2022-05-23 RX ORDER — MECLIZINE HYDROCHLORIDE 25 MG/1
25 TABLET ORAL 3 TIMES DAILY PRN
Status: DISCONTINUED | OUTPATIENT
Start: 2022-05-23 | End: 2022-05-24

## 2022-05-23 RX ORDER — SCOLOPAMINE TRANSDERMAL SYSTEM 1 MG/1
1 PATCH, EXTENDED RELEASE TRANSDERMAL ONCE
Status: DISCONTINUED | OUTPATIENT
Start: 2022-05-23 | End: 2022-05-24

## 2022-05-23 RX ORDER — ONDANSETRON 2 MG/ML
4 INJECTION INTRAMUSCULAR; INTRAVENOUS ONCE
Status: COMPLETED | OUTPATIENT
Start: 2022-05-23 | End: 2022-05-23

## 2022-05-23 RX ORDER — ACETAMINOPHEN 325 MG/1
650 TABLET ORAL EVERY 6 HOURS PRN
Status: DISCONTINUED | OUTPATIENT
Start: 2022-05-23 | End: 2022-05-24

## 2022-05-23 RX ORDER — HEPARIN SODIUM 5000 [USP'U]/ML
5000 INJECTION, SOLUTION INTRAVENOUS; SUBCUTANEOUS EVERY 8 HOURS SCHEDULED
Status: DISCONTINUED | OUTPATIENT
Start: 2022-05-23 | End: 2022-05-24

## 2022-05-23 RX ORDER — MECLIZINE HYDROCHLORIDE 25 MG/1
25 TABLET ORAL ONCE
Status: COMPLETED | OUTPATIENT
Start: 2022-05-23 | End: 2022-05-23

## 2022-05-23 NOTE — PROGRESS NOTES
05/23/22 1730   Clinical Encounter Type   Visited With Patient; Health care provider   Continue Visiting No   Sacramental Encounters   Communion Given Indicator Yes   Taxonomy   Methods Explore presence of God   Interventions Acknowledge current situation; Ask guided questions about redd; Ask guided questions about the nature and presence of God;Identify supportive relationship(s);[place];Perform a Uatsdin rite or ritual;Invite someone to reminisce   Patient appeared happy to talk about the support from her  and love of family. For further spiritual care, please enter a consult in Epic, followed by contacting pager [phone] as needed.

## 2022-05-23 NOTE — ED QUICK NOTES
Orders for admission, patient is aware of plan and ready to go upstairs. Any questions, please call ED RN Jasper Fitzgerald  at extension [phone]. Vaccinated? yes  Type of COVID test sent:rapid  COVID Suspicion level: Low/      Titratable drug(s) infusing:none   Rate:    LOC at time of transport:alert    Other pertinent information:    CIWA score=  NIH score=

## 2022-05-23 NOTE — PLAN OF CARE
Pt admitted to 3610. Denies diarrhea. Wants to get up to BRP.  Pt assisted to BRP x 1 tolerated well. at bedside, all questions and concerns addressed, support given, will monitor

## 2022-05-24 VITALS
BODY MASS INDEX: 34 KG/M2 | RESPIRATION RATE: 17 BRPM | OXYGEN SATURATION: 96 % | HEART RATE: 64 BPM | WEIGHT: 170 LBS | SYSTOLIC BLOOD PRESSURE: 112 MMHG | DIASTOLIC BLOOD PRESSURE: 50 MMHG | TEMPERATURE: 98 F

## 2022-05-24 PROBLEM — H81.10 BPPV (BENIGN PAROXYSMAL POSITIONAL VERTIGO), UNSPECIFIED LATERALITY: Status: ACTIVE | Noted: 2022-05-24

## 2022-05-24 LAB
ANION GAP SERPL CALC-SCNC: 7 MMOL/L (ref 0–18)
BASOPHILS # BLD AUTO: 0.05 X10(3) UL (ref 0–0.2)
BASOPHILS NFR BLD AUTO: 1.1 %
BUN BLD-MCNC: 15 MG/DL (ref 7–18)
CALCIUM BLD-MCNC: 8.8 MG/DL (ref 8.5–10.1)
CHLORIDE SERPL-SCNC: 108 MMOL/L (ref 98–112)
CO2 SERPL-SCNC: 26 MMOL/L (ref 21–32)
CREAT BLD-MCNC: 0.82 MG/DL
EOSINOPHIL # BLD AUTO: 0.56 X10(3) UL (ref 0–0.7)
EOSINOPHIL NFR BLD AUTO: 12.6 %
ERYTHROCYTE [DISTWIDTH] IN BLOOD BY AUTOMATED COUNT: 12.9 %
GLUCOSE BLD-MCNC: 105 MG/DL (ref 70–99)
HCT VFR BLD AUTO: 39.5 %
HGB BLD-MCNC: 13.2 G/DL
IMM GRANULOCYTES # BLD AUTO: 0.02 X10(3) UL (ref 0–1)
IMM GRANULOCYTES NFR BLD: 0.4 %
LYMPHOCYTES # BLD AUTO: 0.68 X10(3) UL (ref 1–4)
LYMPHOCYTES NFR BLD AUTO: 15.2 %
MCH RBC QN AUTO: 30.7 PG (ref 26–34)
MCHC RBC AUTO-ENTMCNC: 33.4 G/DL (ref 31–37)
MCV RBC AUTO: 91.9 FL
MONOCYTES # BLD AUTO: 0.41 X10(3) UL (ref 0.1–1)
MONOCYTES NFR BLD AUTO: 9.2 %
NEUTROPHILS # BLD AUTO: 2.74 X10 (3) UL (ref 1.5–7.7)
NEUTROPHILS # BLD AUTO: 2.74 X10(3) UL (ref 1.5–7.7)
NEUTROPHILS NFR BLD AUTO: 61.5 %
OSMOLALITY SERPL CALC.SUM OF ELEC: 293 MOSM/KG (ref 275–295)
PLATELET # BLD AUTO: 221 10(3)UL (ref 150–450)
POTASSIUM SERPL-SCNC: 3.9 MMOL/L (ref 3.5–5.1)
RBC # BLD AUTO: 4.3 X10(6)UL
SODIUM SERPL-SCNC: 141 MMOL/L (ref 136–145)
WBC # BLD AUTO: 4.5 X10(3) UL (ref 4–11)

## 2022-05-24 PROCEDURE — 80048 BASIC METABOLIC PNL TOTAL CA: CPT | Performed by: HOSPITALIST

## 2022-05-24 PROCEDURE — 97166 OT EVAL MOD COMPLEX 45 MIN: CPT

## 2022-05-24 PROCEDURE — 97116 GAIT TRAINING THERAPY: CPT

## 2022-05-24 PROCEDURE — 97161 PT EVAL LOW COMPLEX 20 MIN: CPT

## 2022-05-24 PROCEDURE — 97530 THERAPEUTIC ACTIVITIES: CPT

## 2022-05-24 PROCEDURE — 85025 COMPLETE CBC W/AUTO DIFF WBC: CPT | Performed by: HOSPITALIST

## 2022-05-24 RX ORDER — MECLIZINE HYDROCHLORIDE 25 MG/1
25 TABLET ORAL 3 TIMES DAILY PRN
Qty: 14 TABLET | Refills: 0 | Status: SHIPPED | OUTPATIENT
Start: 2022-05-24

## 2022-05-24 NOTE — PROGRESS NOTES
NURSING DISCHARGE NOTE    Discharged Home via Wheelchair. Accompanied by Family member and support staff  Belongings Taken by patient/family. Discharge instructions and medication reviewed with patient.

## 2022-05-24 NOTE — PLAN OF CARE
Received pt. In bed on room air. States she feels much better than when she came in. States dizziness is minimal and only when she turns her head too fast.  Denies any pain. Did not like the [employer]. DC'd and she ambulated to the BR. Safety measure in place. ?  Discharge to home in am.

## 2022-05-24 NOTE — PROGRESS NOTES
Pt alert and oriented x 4   Assumed pt care at 0730  PT to eval for vestibular training  Fall risk bed in lowest position and call light within reach  Tele-NSR, SB  RA  Regular/Gneral Diet  PIV left AV saline locked\  PO Antivert given this AM for mild dizziness  Denies pain  Ambulates to BR with standby assist  Continue to monitor

## 2022-08-17 ENCOUNTER — APPOINTMENT (OUTPATIENT)
Dept: URBAN - METROPOLITAN AREA CLINIC 242 | Age: 82
Setting detail: DERMATOLOGY
End: 2022-08-17

## 2022-08-17 DIAGNOSIS — L20.89 OTHER ATOPIC DERMATITIS: ICD-10-CM

## 2022-08-17 PROBLEM — L20.84 INTRINSIC (ALLERGIC) ECZEMA: Status: ACTIVE | Noted: 2022-08-17

## 2022-08-17 PROCEDURE — 96372 THER/PROPH/DIAG INJ SC/IM: CPT

## 2022-08-17 PROCEDURE — OTHER INTRAMUSCULAR KENALOG: OTHER

## 2022-08-17 PROCEDURE — OTHER COUNSELING: OTHER

## 2022-08-17 PROCEDURE — 99213 OFFICE O/P EST LOW 20 MIN: CPT | Mod: 25

## 2022-08-17 PROCEDURE — OTHER DIAGNOSIS COMMENT: OTHER

## 2022-08-17 PROCEDURE — OTHER SEPARATE AND IDENTIFIABLE DOCUMENTATION: OTHER

## 2022-08-17 ASSESSMENT — LOCATION DETAILED DESCRIPTION DERM
LOCATION DETAILED: RIGHT BUTTOCK
LOCATION DETAILED: LEFT ANTERIOR DISTAL UPPER ARM
LOCATION DETAILED: LEFT VENTRAL DISTAL FOREARM
LOCATION DETAILED: RIGHT POPLITEAL SKIN
LOCATION DETAILED: RIGHT ANTERIOR DISTAL UPPER ARM
LOCATION DETAILED: SUPERIOR THORACIC SPINE
LOCATION DETAILED: LEFT POPLITEAL SKIN
LOCATION DETAILED: RIGHT PROXIMAL RADIAL DORSAL FOREARM
LOCATION DETAILED: LEFT PROXIMAL DORSAL FOREARM
LOCATION DETAILED: LEFT PROXIMAL POSTERIOR UPPER ARM
LOCATION DETAILED: RIGHT DISTAL MEDIAL POSTERIOR UPPER ARM
LOCATION DETAILED: RIGHT VENTRAL PROXIMAL FOREARM
LOCATION DETAILED: SUPERIOR LUMBAR SPINE

## 2022-08-17 ASSESSMENT — LOCATION SIMPLE DESCRIPTION DERM
LOCATION SIMPLE: UPPER BACK
LOCATION SIMPLE: LEFT FOREARM
LOCATION SIMPLE: RIGHT POSTERIOR UPPER ARM
LOCATION SIMPLE: RIGHT UPPER ARM
LOCATION SIMPLE: LEFT POSTERIOR UPPER ARM
LOCATION SIMPLE: LOWER BACK
LOCATION SIMPLE: RIGHT POPLITEAL SKIN
LOCATION SIMPLE: RIGHT BUTTOCK
LOCATION SIMPLE: LEFT UPPER ARM
LOCATION SIMPLE: LEFT POPLITEAL SKIN
LOCATION SIMPLE: RIGHT FOREARM

## 2022-08-17 ASSESSMENT — LOCATION ZONE DERM
LOCATION ZONE: ARM
LOCATION ZONE: LEG
LOCATION ZONE: TRUNK

## 2022-09-15 ENCOUNTER — APPOINTMENT (OUTPATIENT)
Dept: URBAN - METROPOLITAN AREA CLINIC 242 | Age: 82
Setting detail: DERMATOLOGY
End: 2022-09-15

## 2022-09-15 DIAGNOSIS — L20.89 OTHER ATOPIC DERMATITIS: ICD-10-CM

## 2022-09-15 PROBLEM — L20.84 INTRINSIC (ALLERGIC) ECZEMA: Status: ACTIVE | Noted: 2022-09-15

## 2022-09-15 PROCEDURE — 99213 OFFICE O/P EST LOW 20 MIN: CPT

## 2022-09-15 PROCEDURE — OTHER COUNSELING: OTHER

## 2022-09-15 PROCEDURE — OTHER PRESCRIPTION MEDICATION MANAGEMENT: OTHER

## 2022-09-15 ASSESSMENT — LOCATION DETAILED DESCRIPTION DERM
LOCATION DETAILED: RIGHT PROXIMAL DORSAL FOREARM
LOCATION DETAILED: LEFT PROXIMAL PRETIBIAL REGION
LOCATION DETAILED: LEFT PROXIMAL DORSAL FOREARM
LOCATION DETAILED: EPIGASTRIC SKIN
LOCATION DETAILED: RIGHT PROXIMAL POSTERIOR UPPER ARM
LOCATION DETAILED: LEFT PROXIMAL POSTERIOR UPPER ARM
LOCATION DETAILED: MIDDLE STERNUM
LOCATION DETAILED: INFERIOR THORACIC SPINE
LOCATION DETAILED: RIGHT PROXIMAL PRETIBIAL REGION

## 2022-09-15 ASSESSMENT — LOCATION SIMPLE DESCRIPTION DERM
LOCATION SIMPLE: LEFT PRETIBIAL REGION
LOCATION SIMPLE: LEFT POSTERIOR UPPER ARM
LOCATION SIMPLE: ABDOMEN
LOCATION SIMPLE: RIGHT FOREARM
LOCATION SIMPLE: RIGHT PRETIBIAL REGION
LOCATION SIMPLE: CHEST
LOCATION SIMPLE: LEFT FOREARM
LOCATION SIMPLE: RIGHT POSTERIOR UPPER ARM
LOCATION SIMPLE: UPPER BACK

## 2022-09-15 ASSESSMENT — LOCATION ZONE DERM
LOCATION ZONE: LEG
LOCATION ZONE: TRUNK
LOCATION ZONE: ARM

## 2022-09-15 NOTE — PROCEDURE: PRESCRIPTION MEDICATION MANAGEMENT
Plan: Advised to moisturize daily fir prevention.
Initiate Treatment: TAC .1%bid as needed for flares.pt has at home.
Render In Strict Bullet Format?: No
Detail Level: Zone

## 2023-02-24 ENCOUNTER — APPOINTMENT (OUTPATIENT)
Dept: URBAN - METROPOLITAN AREA CLINIC 247 | Age: 83
Setting detail: DERMATOLOGY
End: 2023-02-28

## 2023-02-24 DIAGNOSIS — L20.89 OTHER ATOPIC DERMATITIS: ICD-10-CM

## 2023-02-24 PROBLEM — L20.84 INTRINSIC (ALLERGIC) ECZEMA: Status: ACTIVE | Noted: 2023-02-24

## 2023-02-24 PROCEDURE — OTHER COUNSELING: OTHER

## 2023-02-24 PROCEDURE — OTHER MEDICATION COUNSELING: OTHER

## 2023-02-24 PROCEDURE — OTHER MIPS QUALITY: OTHER

## 2023-02-24 PROCEDURE — 99213 OFFICE O/P EST LOW 20 MIN: CPT

## 2023-02-24 PROCEDURE — OTHER PRESCRIPTION: OTHER

## 2023-02-24 PROCEDURE — OTHER PRESCRIPTION MEDICATION MANAGEMENT: OTHER

## 2023-02-24 RX ORDER — GABAPENTIN 100 MG/1
CAPSULE ORAL
Qty: 30 | Refills: 1 | Status: ERX | COMMUNITY
Start: 2023-02-24

## 2023-02-24 ASSESSMENT — LOCATION SIMPLE DESCRIPTION DERM
LOCATION SIMPLE: LEFT POSTERIOR UPPER ARM
LOCATION SIMPLE: LEFT PRETIBIAL REGION
LOCATION SIMPLE: LEFT FOREARM
LOCATION SIMPLE: RIGHT PRETIBIAL REGION
LOCATION SIMPLE: RIGHT POSTERIOR UPPER ARM
LOCATION SIMPLE: RIGHT FOREARM
LOCATION SIMPLE: UPPER BACK
LOCATION SIMPLE: ABDOMEN
LOCATION SIMPLE: CHEST

## 2023-02-24 ASSESSMENT — LOCATION ZONE DERM
LOCATION ZONE: TRUNK
LOCATION ZONE: ARM
LOCATION ZONE: LEG

## 2023-02-24 ASSESSMENT — LOCATION DETAILED DESCRIPTION DERM
LOCATION DETAILED: RIGHT PROXIMAL PRETIBIAL REGION
LOCATION DETAILED: LEFT PROXIMAL PRETIBIAL REGION
LOCATION DETAILED: INFERIOR THORACIC SPINE
LOCATION DETAILED: RIGHT PROXIMAL DORSAL FOREARM
LOCATION DETAILED: EPIGASTRIC SKIN
LOCATION DETAILED: LEFT PROXIMAL POSTERIOR UPPER ARM
LOCATION DETAILED: RIGHT PROXIMAL POSTERIOR UPPER ARM
LOCATION DETAILED: LEFT PROXIMAL DORSAL FOREARM
LOCATION DETAILED: MIDDLE STERNUM

## 2023-02-24 NOTE — PROCEDURE: PRESCRIPTION MEDICATION MANAGEMENT
Detail Level: Zone
Continue Regimen: TAC 0.1% cream BID PRN.
Initiate Treatment: gabapentin 100 mg capsule QHS
Render In Strict Bullet Format?: No

## 2023-02-24 NOTE — PROCEDURE: MEDICATION COUNSELING
Xelryannz Pregnancy And Lactation Text: This medication is Pregnancy Category D and is not considered safe during pregnancy.  The risk during breast feeding is also uncertain.

## 2023-04-14 ENCOUNTER — APPOINTMENT (OUTPATIENT)
Dept: URBAN - METROPOLITAN AREA CLINIC 247 | Age: 83
Setting detail: DERMATOLOGY
End: 2023-04-19

## 2023-04-14 DIAGNOSIS — L20.89 OTHER ATOPIC DERMATITIS: ICD-10-CM

## 2023-04-14 PROBLEM — L20.84 INTRINSIC (ALLERGIC) ECZEMA: Status: ACTIVE | Noted: 2023-04-14

## 2023-04-14 PROCEDURE — OTHER ADDITIONAL NOTES: OTHER

## 2023-04-14 PROCEDURE — OTHER COUNSELING: OTHER

## 2023-04-14 PROCEDURE — OTHER PRESCRIPTION MEDICATION MANAGEMENT: OTHER

## 2023-04-14 PROCEDURE — 99213 OFFICE O/P EST LOW 20 MIN: CPT

## 2023-04-14 PROCEDURE — OTHER MEDICATION COUNSELING: OTHER

## 2023-04-14 PROCEDURE — OTHER PRESCRIPTION: OTHER

## 2023-04-14 RX ORDER — TRIAMCINOLONE ACETONIDE 1 MG/G
CREAM TOPICAL
Qty: 454 | Refills: 2 | Status: ERX | COMMUNITY
Start: 2023-04-14

## 2023-04-14 RX ORDER — GABAPENTIN 100 MG/1
QHS CAPSULE ORAL
Qty: 30 | Refills: 6 | Status: ERX

## 2023-04-14 ASSESSMENT — LOCATION DETAILED DESCRIPTION DERM
LOCATION DETAILED: MIDDLE STERNUM
LOCATION DETAILED: LEFT PROXIMAL POSTERIOR UPPER ARM
LOCATION DETAILED: RIGHT PROXIMAL DORSAL FOREARM
LOCATION DETAILED: RIGHT PROXIMAL PRETIBIAL REGION
LOCATION DETAILED: INFERIOR THORACIC SPINE
LOCATION DETAILED: EPIGASTRIC SKIN
LOCATION DETAILED: RIGHT PROXIMAL POSTERIOR UPPER ARM
LOCATION DETAILED: LEFT PROXIMAL PRETIBIAL REGION
LOCATION DETAILED: LEFT PROXIMAL DORSAL FOREARM

## 2023-04-14 ASSESSMENT — LOCATION ZONE DERM
LOCATION ZONE: TRUNK
LOCATION ZONE: LEG
LOCATION ZONE: ARM

## 2023-04-14 ASSESSMENT — LOCATION SIMPLE DESCRIPTION DERM
LOCATION SIMPLE: LEFT POSTERIOR UPPER ARM
LOCATION SIMPLE: UPPER BACK
LOCATION SIMPLE: ABDOMEN
LOCATION SIMPLE: RIGHT PRETIBIAL REGION
LOCATION SIMPLE: RIGHT POSTERIOR UPPER ARM
LOCATION SIMPLE: LEFT FOREARM
LOCATION SIMPLE: LEFT PRETIBIAL REGION
LOCATION SIMPLE: RIGHT FOREARM
LOCATION SIMPLE: CHEST

## 2023-04-14 NOTE — PROCEDURE: PRESCRIPTION MEDICATION MANAGEMENT
Detail Level: Zone
Render In Strict Bullet Format?: No
Continue Regimen: TAC 0.1% cream BID PRN\\ngabapentin 100 mg capsule QHS

## 2023-04-14 NOTE — PROCEDURE: ADDITIONAL NOTES
Render Risk Assessment In Note?: yes
Additional Notes: Pt states prefers to take gabapentin 100 mg QOHS due to stomach upset\\nRecommended Cetaphil/CeraVe creams for daily use
Detail Level: Simple

## 2023-04-14 NOTE — PROCEDURE: MEDICATION COUNSELING
[Mother] : mother Aklief counseling:  Patient advised to apply a pea-sized amount only at bedtime and wait 30 minutes after washing their face before applying.  If too drying, patient may add a non-comedogenic moisturizer.  The most commonly reported side effects including irritation, redness, scaling, dryness, stinging, burning, itching, and increased risk of sunburn.  The patient verbalized understanding of the proper use and possible adverse effects of retinoids.  All of the patient's questions and concerns were addressed.

## 2023-04-14 NOTE — PROCEDURE: MEDICATION COUNSELING
80 Low Dose Naltrexone Pregnancy And Lactation Text: Naltrexone is pregnancy category C.  There have been no adequate and well-controlled studies in pregnant women.  It should be used in pregnancy only if the potential benefit justifies the potential risk to the fetus.   Limited data indicates that naltrexone is minimally excreted into breastmilk.

## 2023-07-13 ENCOUNTER — HOSPITAL ENCOUNTER (OUTPATIENT)
Dept: GENERAL RADIOLOGY | Age: 83
Discharge: HOME | End: 2023-07-13
Attending: INTERNAL MEDICINE
Payer: MEDICARE

## 2023-07-13 DIAGNOSIS — M54.2 NECK PAIN: ICD-10-CM

## 2023-07-13 PROCEDURE — 72052 X-RAY EXAM NECK SPINE 6/>VWS: CPT | Performed by: INTERNAL MEDICINE

## 2024-08-26 ENCOUNTER — APPOINTMENT (OUTPATIENT)
Dept: URBAN - METROPOLITAN AREA CLINIC 247 | Age: 84
Setting detail: DERMATOLOGY
End: 2024-08-27

## 2024-08-26 DIAGNOSIS — L28.1 PRURIGO NODULARIS: ICD-10-CM

## 2024-08-26 DIAGNOSIS — L20.89 OTHER ATOPIC DERMATITIS: ICD-10-CM

## 2024-08-26 PROCEDURE — 99213 OFFICE O/P EST LOW 20 MIN: CPT

## 2024-08-26 PROCEDURE — OTHER COUNSELING: OTHER

## 2024-08-26 PROCEDURE — OTHER PRESCRIPTION MEDICATION MANAGEMENT: OTHER

## 2024-08-26 PROCEDURE — OTHER PRESCRIPTION: OTHER

## 2024-08-26 PROCEDURE — OTHER OTC TREATMENT REGIMEN: OTHER

## 2024-08-26 RX ORDER — MINOCYCLINE HYDROCHLORIDE 100 MG/1
CAPSULE ORAL
Qty: 20 | Refills: 0 | Status: ERX | COMMUNITY
Start: 2024-08-26

## 2024-08-26 RX ORDER — HYDROXYZINE HYDROCHLORIDE 10 MG/1
TABLET, FILM COATED ORAL QHS
Qty: 30 | Refills: 3 | Status: ERX | COMMUNITY
Start: 2024-08-26

## 2024-08-26 RX ORDER — PREDNISONE 10 MG/1
TABLET ORAL
Qty: 73 | Refills: 0 | Status: ERX | COMMUNITY
Start: 2024-08-26

## 2024-08-26 ASSESSMENT — LOCATION ZONE DERM: LOCATION ZONE: TRUNK

## 2024-08-26 ASSESSMENT — LOCATION SIMPLE DESCRIPTION DERM: LOCATION SIMPLE: UPPER BACK

## 2024-08-26 ASSESSMENT — LOCATION DETAILED DESCRIPTION DERM: LOCATION DETAILED: SUPERIOR THORACIC SPINE

## 2024-08-26 NOTE — PROCEDURE: PRESCRIPTION MEDICATION MANAGEMENT
Detail Level: Zone
Render In Strict Bullet Format?: No
Initiate Treatment: prednisone 10 mg tablet \\nQuantity: 73.0 Tablet  Days Supply: 28\\nSig: Take by mouth 6 x3 days, 4 x5 days, 3 x5 days, 2 x5 days, 1 x10 days\\nHydroxyzine 10mg 1 po qhs prn \\nMcn 100mg 1 po bid x10 days

## 2024-08-26 NOTE — PROCEDURE: OTC TREATMENT REGIMEN
Detail Level: Zone
Patient Specific Otc Recommendations (Will Not Stick From Patient To Patient): Cetaphil cream to aa bid

## 2024-08-30 RX ORDER — GABAPENTIN 100 MG/1
CAPSULE ORAL
Qty: 30 | Refills: 3 | Status: ERX

## 2025-07-14 ENCOUNTER — APPOINTMENT (OUTPATIENT)
Dept: MRI IMAGING | Facility: HOSPITAL | Age: 85
End: 2025-07-14
Attending: EMERGENCY MEDICINE
Payer: MEDICARE

## 2025-07-14 ENCOUNTER — HOSPITAL ENCOUNTER (INPATIENT)
Facility: HOSPITAL | Age: 85
LOS: 2 days | Discharge: HOME | DRG: 066 | End: 2025-07-16
Attending: EMERGENCY MEDICINE | Admitting: INTERNAL MEDICINE
Payer: MEDICARE

## 2025-07-14 ENCOUNTER — APPOINTMENT (OUTPATIENT)
Dept: MRI IMAGING | Facility: HOSPITAL | Age: 85
DRG: 066 | End: 2025-07-14
Attending: EMERGENCY MEDICINE
Payer: MEDICARE

## 2025-07-14 ENCOUNTER — APPOINTMENT (OUTPATIENT)
Dept: CT IMAGING | Facility: HOSPITAL | Age: 85
DRG: 066 | End: 2025-07-14
Attending: EMERGENCY MEDICINE
Payer: MEDICARE

## 2025-07-14 ENCOUNTER — HOSPITAL ENCOUNTER (INPATIENT)
Facility: HOSPITAL | Age: 85
LOS: 2 days | Discharge: HOME | End: 2025-07-16
Attending: EMERGENCY MEDICINE | Admitting: INTERNAL MEDICINE
Payer: MEDICARE

## 2025-07-14 ENCOUNTER — APPOINTMENT (OUTPATIENT)
Dept: CV DIAGNOSTICS | Facility: HOSPITAL | Age: 85
DRG: 066 | End: 2025-07-14
Attending: Other
Payer: MEDICARE

## 2025-07-14 ENCOUNTER — APPOINTMENT (OUTPATIENT)
Dept: CT IMAGING | Facility: HOSPITAL | Age: 85
End: 2025-07-14
Attending: EMERGENCY MEDICINE
Payer: MEDICARE

## 2025-07-14 ENCOUNTER — APPOINTMENT (OUTPATIENT)
Dept: CV DIAGNOSTICS | Facility: HOSPITAL | Age: 85
End: 2025-07-14
Attending: Other
Payer: MEDICARE

## 2025-07-14 DIAGNOSIS — I63.9 ACUTE CVA (CEREBROVASCULAR ACCIDENT) (HCC): Primary | ICD-10-CM

## 2025-07-14 LAB
ALBUMIN SERPL-MCNC: 4.6 G/DL (ref 3.2–4.8)
ALBUMIN/GLOB SERPL: 1.6 (ref 1–2)
ALP LIVER SERPL-CCNC: 63 U/L (ref 55–142)
ALT SERPL-CCNC: 13 U/L (ref 10–49)
ANION GAP SERPL CALC-SCNC: 9 MMOL/L (ref 0–18)
AST SERPL-CCNC: 24 U/L (ref ?–34)
ATRIAL RATE: 80 BPM
BASOPHILS # BLD AUTO: 0.07 X10(3) UL (ref 0–0.2)
BASOPHILS NFR BLD AUTO: 1.1 %
BILIRUB SERPL-MCNC: 1.1 MG/DL (ref 0.2–1.1)
BILIRUB UR QL STRIP.AUTO: NEGATIVE
BUN BLD-MCNC: 13 MG/DL (ref 9–23)
CALCIUM BLD-MCNC: 9.5 MG/DL (ref 8.7–10.6)
CHLORIDE SERPL-SCNC: 105 MMOL/L (ref 98–112)
CHOLEST SERPL-MCNC: 249 MG/DL (ref ?–200)
CLARITY UR REFRACT.AUTO: CLEAR
CO2 SERPL-SCNC: 27 MMOL/L (ref 21–32)
COLOR UR AUTO: COLORLESS
CREAT BLD-MCNC: 0.88 MG/DL (ref 0.55–1.02)
EGFRCR SERPLBLD CKD-EPI 2021: 65 ML/MIN/1.73M2 (ref 60–?)
EOSINOPHIL # BLD AUTO: 0.33 X10(3) UL (ref 0–0.7)
EOSINOPHIL NFR BLD AUTO: 5 %
ERYTHROCYTE [DISTWIDTH] IN BLOOD BY AUTOMATED COUNT: 13.6 %
EST. AVERAGE GLUCOSE BLD GHB EST-MCNC: 123 MG/DL (ref 68–126)
GLOBULIN PLAS-MCNC: 2.8 G/DL (ref 2–3.5)
GLUCOSE BLD-MCNC: 105 MG/DL (ref 70–99)
GLUCOSE BLD-MCNC: 111 MG/DL (ref 70–99)
GLUCOSE BLD-MCNC: 119 MG/DL (ref 70–99)
GLUCOSE BLD-MCNC: 124 MG/DL (ref 70–99)
GLUCOSE BLD-MCNC: 88 MG/DL (ref 70–99)
GLUCOSE UR STRIP.AUTO-MCNC: NORMAL MG/DL
HBA1C MFR BLD: 5.9 % (ref ?–5.7)
HCT VFR BLD AUTO: 40.6 % (ref 35–48)
HDLC SERPL-MCNC: 77 MG/DL (ref 40–59)
HGB BLD-MCNC: 14.7 G/DL (ref 12–16)
IMM GRANULOCYTES # BLD AUTO: 0.02 X10(3) UL (ref 0–1)
IMM GRANULOCYTES NFR BLD: 0.3 %
KETONES UR STRIP.AUTO-MCNC: NEGATIVE MG/DL
LDLC SERPL CALC-MCNC: 148 MG/DL (ref ?–100)
LEUKOCYTE ESTERASE UR QL STRIP.AUTO: NEGATIVE
LYMPHOCYTES # BLD AUTO: 1.04 X10(3) UL (ref 1–4)
LYMPHOCYTES NFR BLD AUTO: 15.8 %
MCH RBC QN AUTO: 31.3 PG (ref 26–34)
MCHC RBC AUTO-ENTMCNC: 36.2 G/DL (ref 31–37)
MCV RBC AUTO: 86.6 FL (ref 80–100)
MONOCYTES # BLD AUTO: 0.54 X10(3) UL (ref 0.1–1)
MONOCYTES NFR BLD AUTO: 8.2 %
NEUTROPHILS # BLD AUTO: 4.6 X10 (3) UL (ref 1.5–7.7)
NEUTROPHILS # BLD AUTO: 4.6 X10(3) UL (ref 1.5–7.7)
NEUTROPHILS NFR BLD AUTO: 69.6 %
NITRITE UR QL STRIP.AUTO: NEGATIVE
NONHDLC SERPL-MCNC: 172 MG/DL (ref ?–130)
OSMOLALITY SERPL CALC.SUM OF ELEC: 294 MOSM/KG (ref 275–295)
P AXIS: 6 DEGREES
P-R INTERVAL: 186 MS
PH UR STRIP.AUTO: 6.5 (ref 5–8)
PLATELET # BLD AUTO: 240 10(3)UL (ref 150–450)
POTASSIUM SERPL-SCNC: 4 MMOL/L (ref 3.5–5.1)
PROT SERPL-MCNC: 7.4 G/DL (ref 5.7–8.2)
PROT UR STRIP.AUTO-MCNC: NEGATIVE MG/DL
Q-T INTERVAL: 420 MS
QRS DURATION: 90 MS
QTC CALCULATION (BEZET): 484 MS
R AXIS: 12 DEGREES
RBC # BLD AUTO: 4.69 X10(6)UL (ref 3.8–5.3)
SODIUM SERPL-SCNC: 141 MMOL/L (ref 136–145)
SP GR UR STRIP.AUTO: 1.01 (ref 1–1.03)
T AXIS: 0 DEGREES
TRIGL SERPL-MCNC: 140 MG/DL (ref 30–149)
UROBILINOGEN UR STRIP.AUTO-MCNC: NORMAL MG/DL
VENTRICULAR RATE: 80 BPM
VLDLC SERPL CALC-MCNC: 26 MG/DL (ref 0–30)
WBC # BLD AUTO: 6.6 X10(3) UL (ref 4–11)

## 2025-07-14 PROCEDURE — 99223 1ST HOSP IP/OBS HIGH 75: CPT | Performed by: OTHER

## 2025-07-14 PROCEDURE — 70450 CT HEAD/BRAIN W/O DYE: CPT | Performed by: EMERGENCY MEDICINE

## 2025-07-14 PROCEDURE — 70551 MRI BRAIN STEM W/O DYE: CPT | Performed by: EMERGENCY MEDICINE

## 2025-07-14 RX ORDER — SODIUM CHLORIDE 9 MG/ML
INJECTION, SOLUTION INTRAVENOUS CONTINUOUS
Status: ACTIVE | OUTPATIENT
Start: 2025-07-14 | End: 2025-07-16

## 2025-07-14 RX ORDER — METFORMIN HYDROCHLORIDE EXTENDED-RELEASE TABLETS 500 MG/1
500 TABLET, FILM COATED, EXTENDED RELEASE ORAL
COMMUNITY
End: 2025-07-14 | Stop reason: CLARIF

## 2025-07-14 RX ORDER — ATORVASTATIN CALCIUM 80 MG/1
80 TABLET, FILM COATED ORAL NIGHTLY
Status: DISCONTINUED | OUTPATIENT
Start: 2025-07-14 | End: 2025-07-16

## 2025-07-14 RX ORDER — METOCLOPRAMIDE HYDROCHLORIDE 5 MG/ML
5 INJECTION INTRAMUSCULAR; INTRAVENOUS EVERY 8 HOURS PRN
Status: DISCONTINUED | OUTPATIENT
Start: 2025-07-14 | End: 2025-07-14

## 2025-07-14 RX ORDER — ACETAMINOPHEN 325 MG/1
650 TABLET ORAL EVERY 4 HOURS PRN
Status: DISCONTINUED | OUTPATIENT
Start: 2025-07-14 | End: 2025-07-16

## 2025-07-14 RX ORDER — HYDRALAZINE HYDROCHLORIDE 20 MG/ML
10 INJECTION INTRAMUSCULAR; INTRAVENOUS EVERY 2 HOUR PRN
Status: DISCONTINUED | OUTPATIENT
Start: 2025-07-14 | End: 2025-07-16

## 2025-07-14 RX ORDER — ONDANSETRON 2 MG/ML
4 INJECTION INTRAMUSCULAR; INTRAVENOUS EVERY 6 HOURS PRN
Status: DISCONTINUED | OUTPATIENT
Start: 2025-07-14 | End: 2025-07-14

## 2025-07-14 RX ORDER — ASPIRIN 81 MG/1
81 TABLET, CHEWABLE ORAL DAILY
Status: DISCONTINUED | OUTPATIENT
Start: 2025-07-15 | End: 2025-07-16

## 2025-07-14 RX ORDER — NICOTINE POLACRILEX 4 MG
30 LOZENGE BUCCAL
Status: DISCONTINUED | OUTPATIENT
Start: 2025-07-14 | End: 2025-07-16

## 2025-07-14 RX ORDER — ASPIRIN 81 MG/1
81 TABLET, CHEWABLE ORAL ONCE
Status: COMPLETED | OUTPATIENT
Start: 2025-07-14 | End: 2025-07-14

## 2025-07-14 RX ORDER — ONDANSETRON 2 MG/ML
4 INJECTION INTRAMUSCULAR; INTRAVENOUS EVERY 6 HOURS PRN
Status: DISCONTINUED | OUTPATIENT
Start: 2025-07-14 | End: 2025-07-16

## 2025-07-14 RX ORDER — ACETAMINOPHEN 500 MG
500 TABLET ORAL EVERY 6 HOURS PRN
Status: DISCONTINUED | OUTPATIENT
Start: 2025-07-14 | End: 2025-07-16

## 2025-07-14 RX ORDER — ENOXAPARIN SODIUM 100 MG/ML
40 INJECTION SUBCUTANEOUS DAILY
Status: DISCONTINUED | OUTPATIENT
Start: 2025-07-14 | End: 2025-07-16

## 2025-07-14 RX ORDER — DEXTROSE MONOHYDRATE 25 G/50ML
50 INJECTION, SOLUTION INTRAVENOUS
Status: DISCONTINUED | OUTPATIENT
Start: 2025-07-14 | End: 2025-07-16

## 2025-07-14 RX ORDER — ENOXAPARIN SODIUM 100 MG/ML
40 INJECTION SUBCUTANEOUS NIGHTLY
Status: DISCONTINUED | OUTPATIENT
Start: 2025-07-14 | End: 2025-07-14

## 2025-07-14 RX ORDER — NICOTINE POLACRILEX 4 MG
15 LOZENGE BUCCAL
Status: DISCONTINUED | OUTPATIENT
Start: 2025-07-14 | End: 2025-07-16

## 2025-07-14 RX ORDER — METOCLOPRAMIDE HYDROCHLORIDE 5 MG/ML
5 INJECTION INTRAMUSCULAR; INTRAVENOUS EVERY 8 HOURS PRN
Status: DISCONTINUED | OUTPATIENT
Start: 2025-07-14 | End: 2025-07-16

## 2025-07-14 RX ORDER — LABETALOL HYDROCHLORIDE 5 MG/ML
10 INJECTION, SOLUTION INTRAVENOUS EVERY 10 MIN PRN
Status: DISCONTINUED | OUTPATIENT
Start: 2025-07-14 | End: 2025-07-16

## 2025-07-14 RX ORDER — ACETAMINOPHEN 650 MG/1
650 SUPPOSITORY RECTAL EVERY 4 HOURS PRN
Status: DISCONTINUED | OUTPATIENT
Start: 2025-07-14 | End: 2025-07-16

## 2025-07-14 NOTE — PLAN OF CARE
Stroke booklet given to patient; the following education was provided:     What is Ischemic stroke  BEFAST - Stroke warning signs and symptoms  How to initiate EMS  Secondary stroke prevention and personalized risk factors: Obesity, pre-diabetes, HLD   Lifestyle modifications (nutrition and exercise)  Post-stroke recovery and follow-up  Community resources (stroke support group and non-emergent stroke line)    Patient present during education, Patient receptive to teachings. All pertinent questions and concerns were addressed.      RN Stroke Navigator team  [phone]

## 2025-07-14 NOTE — ED QUICK NOTES
This writer to patient room to do an EKG, patient in CT Scan at this time, EKG machine at bedside.

## 2025-07-14 NOTE — ED INITIAL ASSESSMENT (HPI)
Patient ambulatory to ER with family. Patient states since Friday she felt 'off' and confused. Over weekend felt better. This morning woke up around 0500 and states that she felt 'off' again. Posterior HA that began Friday, went away over weekend and back today with some tearing in her eyes.   Patient awake and alert x3  States Friday cannot recall her events of the day

## 2025-07-14 NOTE — ED PROVIDER NOTES
Patient Seen in: [facility] Emergency Department        History  Chief Complaint   Patient presents with    Altered Mental Status     Hasn't felt right since Friday night.     Stated Complaint: Confusion    Subjective:   HPI     An 85-year-old female who presents with episodes of confusion and memory loss starting Friday.    She experienced a sudden episode of complete memory loss and confusion on Friday, which resolved by Saturday. On , she was able to visit her daughter's house for lunch and felt fine.    This morning, she woke up with a similar sensation of confusion and fogginess, although she was able to remember things this time. She describes the sensation as 'just a weird feeling' and 'not feeling herself'.    No headache, chest pain, shortness of breath cough, fever, or urinary symptoms.         Objective:     Past Medical History:    Anesthesia complication    hypotension    Back problem    cervical stenosis    Bowel obstruction (HCC)    Deep vein thrombosis (HCC)    right leg    Disorder of thyroid    Hearing impairment    no hearing aids    Hepatitis    54 years ago now aymptomatic    History of blood transfusion    age 50'S    HYPOTHYROIDISM    Pancreatitis (HCC)    Visual impairment    reading glasses              Past Surgical History:   Procedure Laterality Date          x 4    Colonoscopy  = Polyps    Colonoscopy  16= Diverticulosis    Repeat 2019    Colonoscopy  2014    Procedure: COLONOSCOPY;  Surgeon: Clinton Ye MD;  Location:  ENDOSCOPY    Endoscopic ultrasound - internal  2021    [place], CBD bx = inflammation, Neg malignancy    Ercp,diagnostic  2021    Endoscopic Retrograde Cholangiopancreatoscopy / EUS    Hysterectomy      Injection, w/wo contrast, dx/therapeutic substance, epidural/subarachnoid; cervical/thoracic N/A 3/29/2016    Procedure: CERVICAL EPIDURAL;  Surgeon: Deon Hernández MD;  Location: [facility]     Injection, w/wo contrast, dx/therapeutic substance, epidural/subarachnoid; cervical/thoracic N/A 5/4/2016    Procedure: CERVICAL EPIDURAL;  Surgeon: Deon Hernández MD;  Location: [facility]    Injection, w/wo contrast, dx/therapeutic substance, epidural/subarachnoid; cervical/thoracic N/A 6/6/2016    Procedure: CERVICAL EPIDURAL;  Surgeon: Deon Hernández MD;  Location: [facility]    Needle biopsy left  6/2012    Other Right     achilles tendon repair /foot sx x 2    Other  11/9/2016    ANTERIOR CERVICAL DISCECTOMY AND FUSION OF CERVICAL 4-CERVICAL 5 AND CERVICAL 5- CERVICAL 6 WITH ALLOGRAFT BONE AND MACHINED ALLOY PLATING    Other  3/30/2017    Anterior cervical wound exploration for redirection of the screws at Cervical 6 level, Dr. Perez    Patient documented not to have experienced any of the following events N/A 3/29/2016    Procedure: CERVICAL EPIDURAL;  Surgeon: Deon Hernández MD;  Location: [facility]    Patient documented not to have experienced any of the following events N/A 5/4/2016    Procedure: CERVICAL EPIDURAL;  Surgeon: Deon Hernández MD;  Location: [facility]    Patient documented not to have experienced any of the following events N/A 6/6/2016    Procedure: CERVICAL EPIDURAL;  Surgeon: Deon Hernández MD;  Location: [facility]    Patient withough preoperative order for iv antibiotic surgical site infection prophylaxis. N/A 3/29/2016    Procedure: CERVICAL EPIDURAL;  Surgeon: Deon Hernández MD;  Location: [facility]    Patient withough preoperative order for iv antibiotic surgical site infection prophylaxis. N/A 5/4/2016    Procedure: CERVICAL EPIDURAL;  Surgeon: Deon Hernández MD;  Location: [facility]    Patient withough preoperative order for iv antibiotic surgical site infection prophylaxis. N/A 6/6/2016    Procedure: CERVICAL EPIDURAL;  Surgeon:  Deon Hernández MD;  Location: [facility]    Spine surgery procedure unlisted  1/2017    AT C6    Upper gi endoscopy,exam                  Social History     Socioeconomic History    Marital status:    Tobacco Use    Smoking status: Never    Smokeless tobacco: Never   Vaping Use    Vaping status: Never Used   Substance and Sexual Activity    Alcohol use: Not Currently    Drug use: No    Sexual activity: Yes     Partners: Male   Other Topics Concern     Service No    Blood Transfusions Yes     Comment: 8-10 yrs ago     Caffeine Concern Yes     Comment: 2-3 cups per day     Occupational Exposure No    Hobby Hazards No    Sleep Concern Yes     Comment: per pt not a very good sleeper    Stress Concern No    Weight Concern Yes     Comment: would like to lose    Special Diet No    Back Care Yes     Comment: lower back on and off    Exercise Yes     Comment: walking routine    Bike Helmet Yes    Seat Belt Yes    Self-Exams Yes     Social Drivers of Health     Food Insecurity: No Food Insecurity (7/14/2025)    NCSS - Food Insecurity     Worried About Running Out of Food in the Last Year: No     Ran Out of Food in the Last Year: No   Transportation Needs: No Transportation Needs (7/14/2025)    NCSS - Transportation     Lack of Transportation: No   Housing Stability: Not At Risk (7/14/2025)    NCSS - Housing/Utilities     Has Housing: Yes     Worried About Losing Housing: No     Unable to Get Utilities: No                                Physical Exam    ED Triage Vitals   BP 07/14/25 0828 149/72   Pulse 07/14/25 0828 102   Resp 07/14/25 0828 18   Temp 07/14/25 0833 97.8 °F (36.6 °C)   Temp src 07/14/25 1600 Oral   SpO2 07/14/25 0828 100 %   O2 Device 07/14/25 0828 None (Room air)       Current Vitals:   Vital Signs  BP: 149/65  Pulse: 71  Resp: 20  Temp: 97.8 °F (36.6 °C)  Temp src: Oral  MAP (mmHg): 82    Oxygen Therapy  SpO2: 95 %  O2 Device: None (Room air)  Pulse Oximetry Type:  Continuous  Oximetry Probe Site Changed: No  Pulse Ox Probe Location: Left hand            Physical Exam  Vital signs reviewed.  Nursing note reviewed.  Constitutional: Alert, well-appearing  Head: Normocephalic, atraumatic  Mouth: Moist  Eyes: Extraocular muscles intact, pupils equal  Cardiovascular: Regular rate and rhythm  Pulmonary: Effort normal, breath sounds normal  Abdomen: Soft, nontender nondistended  Skin: Warm and dry  Musculoskeletal range of motion grossly normal all extremities  Neuro: Alert, at baseline, no focal neuro deficit.  Moves all extremities against gravity.  Face symmetric, speech clear.  NIH stroke score 0  Psych: Mood normal              ED Course  Labs Reviewed   URINALYSIS WITH CULTURE REFLEX - Abnormal; Notable for the following components:       Result Value    Urine Color Colorless (*)     Blood Urine 2+ (*)     RBC Urine 6-10 (*)     Squamous Epi. Cells Few (*)     All other components within normal limits   COMP METABOLIC PANEL (14) - Abnormal; Notable for the following components:    Glucose 124 (*)     All other components within normal limits   LIPID PANEL - Abnormal; Notable for the following components:    Cholesterol, Total 249 (*)     HDL Cholesterol 77 (*)     LDL Cholesterol 148 (*)     Non HDL Chol 172 (*)     All other components within normal limits   HEMOGLOBIN A1C - Abnormal; Notable for the following components:    HgbA1C 5.9 (*)     All other components within normal limits   POCT GLUCOSE - Abnormal; Notable for the following components:    POC Glucose 119 (*)     All other components within normal limits   POCT GLUCOSE - Normal   CBC WITH DIFFERENTIAL WITH PLATELET   RAINBOW DRAW LAVENDER   RAINBOW DRAW LIGHT GREEN   RAINBOW DRAW BLUE     EKG    Rate, intervals and axes as noted on EKG Report.  Rate: 80  Rhythm: Sinus Rhythm  Reading: No contiguous ST-T wave abnormality              MRI BRAIN (NKF=15013)  Result Date: 7/14/2025  PROCEDURE: MRI BRAIN (CPT=70551)  INDICATIONS: Confusion/forgetting events last few days PATIENT STATED HISTORY: COMPARISON: 05/23/2022 MRI BRAIN (CPT=70551) TECHNIQUE: MRI of the brain was performed with multi-planar T1, T2-weighted images with FLAIR sequences and diffusion weighted images without contrast. FINDINGS: MIDLINE STRUCTURES: Midline structures including the corpus callosum, optic chiasm and cerebellar tonsils are unremarkable. INTRACRANIAL: Punctate focus of high signal on diffusion-weighted imaging in the right frontal lobe (image 21) is noted. Moderate FLAIR normalities in the white matter are noted. VENTRICLES/SULCI: Prominence of the subarachnoid spaces noted. No mass effect. No hemorrhage or extra-axial fluid collection is identified. Chronic hemosiderin deposition is suggested in the right frontal lobe sulcus. SINUSES/ORBITS: The visualized paranasal sinuses are clear. The orbits are unremarkable. MASTOIDS: The mastoids are clear. OTHER:Negative.     CONCLUSION: 1. Punctate focus of restricted diffusion in the right frontal lobe gyrus/subcortical white matter may represent a tiny focal subacute infarct. 2. Sequelae of moderate chronic small vessel ischemic disease is suggested. Critical results were discussed with Dr. Arredondo at 10:59 a.m. on July 14, 2025 Electronically Verified and Signed by Attending Radiologist: Davie Ly MD 7/14/2025 11:00 AM Workstation: [id number]    CT BRAIN OR HEAD (CPT=70450)  Result Date: 7/14/2025  PROCEDURE: CT BRAIN OR HEAD (CPT=70450) INDICATIONS: Confusion COMPARISON: MRI of the brain performed 5/23/2022. TECHNIQUE: Noncontrast CT scanning is performed through the brain. Automated exposure control techniques for dose reduction were used. Dose information is transmitted to the ACR (American College of Radiology) NRDR (National Radiology Data Registry) which includes the Dose Index Registry. FINDINGS: VENTRICLES/SULCI: Ventricles and sulci are prominent in size consistent with volume loss.  INTRACRANIAL:       There are no abnormal extraaxial fluid collections.  There is no midline shift.  There is no acute intracranial hemorrhage. Periventricular and subcortical low-attenuation is most consistent with chronic small vessel ischemic change within the deep white matter. SINUSES:                 No sign of acute sinusitis. MASTOIDS:             No sign of acute inflammation. SKULL:                    No evidence for fracture or osseous abnormality. OTHER:                    Atherosclerosis     CONCLUSION: No acute intracranial hemorrhage. Chronic small vessel ischemic change within the deep white matter. If an acute infarct is of high clinical concern, recommend MRI of the brain for further evaluation. Electronically Verified and Signed by Attending Radiologist: Denice Patel MD 2025 8:55 AM Workstation: [id number]                      [facility]     Medical Decision Making:    Differential diagnosis before testing includes UTI, CVA potential life threatening diagnosis which is a medical condition that poses a threat to life/function.     Comorbidities that add complexity to management: Diabetes, obesity    I reviewed prior external ED notes including annual physical exam includes diabetes, obesity    Discussions of management with: Discussed with Dr. Croft, neurology, recommends admission, baby aspirin    Social determinants of health care:  at bedside    I personally reviewed the CT brain and my independent interpretation includes no acute abnormality    Shared decision makin-year-old female here with forgetfulness and amnesia to Friday, states that she did okay on Saturday but then on  she was feeling foggy and confused as well as today.  Her  states that this is an acute onset and not her normal.  MRI brain right frontal lobe tiny subacute infarct.  Neurology recommends baby aspirin and admission.  Admitted to \[facility]\"".  Admission disposition: 2025 11:03 AM            Medical Decision Making      Disposition and Plan     Clinical Impression:  1. Acute CVA (cerebrovascular accident) (HCC)         Disposition:  Admit  7/14/2025 11:03 am    Follow-up:  No follow-up provider specified.        Medications Prescribed:  Current Discharge Medication List                Supplementary Documentation:         Hospital Problems       Present on Admission  Date Reviewed: 4/6/2022          ICD-10-CM Noted POA    * (Principal) Acute CVA (cerebrovascular accident) (HCC) I63.9 7/14/2025 Unknown

## 2025-07-14 NOTE — PLAN OF CARE
Admission Navigator complete  Patient wears glasses and bilateral hearing aids  Patient lives with , Gerardo, who is also her POA.  is at the bedside  Patient resting in bed, awaiting room placement

## 2025-07-14 NOTE — H&P
[facility] Hospitalist H&P/Consult note       CC:   Chief Complaint   Patient presents with    Altered Mental Status     Hasn't felt right since Friday night.        PCP: Nadia Cohen MD    History of Present Illness: Patient is a 85 year old female with PMH sig for predm, here for not feeling well    States that on Friday didn't feel well but hard to elaborate on her symptoms - was having difficutly remembering things, appeared more confused. Was better on Sat / Sunday but symptoms worse again today so came to the hospital. Does report occasional LH but no visual changes, n/v, cp, sob, abd pain, no infectious symptoms like cough, dysuria, diarrhea, f/c     PMH  Past Medical History[1]     PSH  Past Surgical History[2]     ALL:  Allergies[3]     Home Medications:  Medications Taking[4]      Soc Hx  Social History     Tobacco Use    Smoking status: Never    Smokeless tobacco: Never   Substance Use Topics    Alcohol use: Not Currently        Fam Hx  Family History[5]    Review of Systems  Comprehensive ROS reviewed and negative except for what's stated above.        OBJECTIVE:  /73   Pulse 69   Temp 97.8 °F (36.6 °C)   Resp 17   Ht 4' 11\" (1.499 m)   Wt 170 lb (77.1 kg)   SpO2 98%   BMI 34.34 kg/m²   General:  Alert, no distress, appears stated age.   Head:  Normocephalic,    Eyes:  Sclera anicteric,    Throat: MMM   Neck: Supple,    Lungs:   Clear to auscultation bilaterally. Normal effort   Chest wall:  No tenderness or deformity.   Heart:  Regular rate and rhythm    Abdomen:   Soft, NT/ND, +bs   Extremities: Atraumatic,  mild swelling in RLE with calf ttp, states chronic   Skin: No visible rashes or lesions.    Neurologic: Normal strength, no focal deficit appreciated     Diagnostic Data:    CBC/Chem  Recent Labs   Lab 07/14/25  0828   WBC 6.6   HGB 14.7   MCV 86.6   .0       Recent Labs   Lab 07/14/25  0828      K 4.0      CO2 27.0   BUN 13   CREATSERUM 0.88   *   CA 9.5        Recent Labs   Lab 07/14/25  0828   ALT 13   AST 24   ALB 4.6       No results for input(s): \"TROP\" in the last 168 hours.    Additional Diagnostics: ECG: NSR    CXR: image personally reviewed     Radiology: MRI BRAIN (CPT=70551)  Result Date: 7/14/2025  PROCEDURE: MRI BRAIN (CPT=70551) INDICATIONS: Confusion/forgetting events last few days PATIENT STATED HISTORY: COMPARISON: 05/23/2022 MRI BRAIN (CPT=70551) TECHNIQUE: MRI of the brain was performed with multi-planar T1, T2-weighted images with FLAIR sequences and diffusion weighted images without contrast. FINDINGS: MIDLINE STRUCTURES: Midline structures including the corpus callosum, optic chiasm and cerebellar tonsils are unremarkable. INTRACRANIAL: Punctate focus of high signal on diffusion-weighted imaging in the right frontal lobe (image 21) is noted. Moderate FLAIR normalities in the white matter are noted. VENTRICLES/SULCI: Prominence of the subarachnoid spaces noted. No mass effect. No hemorrhage or extra-axial fluid collection is identified. Chronic hemosiderin deposition is suggested in the right frontal lobe sulcus. SINUSES/ORBITS: The visualized paranasal sinuses are clear. The orbits are unremarkable. MASTOIDS: The mastoids are clear. OTHER:Negative.     CONCLUSION: 1. Punctate focus of restricted diffusion in the right frontal lobe gyrus/subcortical white matter may represent a tiny focal subacute infarct. 2. Sequelae of moderate chronic small vessel ischemic disease is suggested. Critical results were discussed with Dr. Arredondo at 10:59 a.m. on July 14, 2025 Electronically Verified and Signed by Attending Radiologist: Davie Ly MD 7/14/2025 11:00 AM Workstation: [id number]    CT BRAIN OR HEAD (CPT=70450)  Result Date: 7/14/2025  PROCEDURE: CT BRAIN OR HEAD (CPT=70450) INDICATIONS: Confusion COMPARISON: MRI of the brain performed 5/23/2022. TECHNIQUE: Noncontrast CT scanning is performed through the brain. Automated exposure control  techniques for dose reduction were used. Dose information is transmitted to the ACR (American College of Radiology) NRDR (National Radiology Data Registry) which includes the Dose Index Registry. FINDINGS: VENTRICLES/SULCI: Ventricles and sulci are prominent in size consistent with volume loss. INTRACRANIAL:       There are no abnormal extraaxial fluid collections.  There is no midline shift.  There is no acute intracranial hemorrhage. Periventricular and subcortical low-attenuation is most consistent with chronic small vessel ischemic change within the deep white matter. SINUSES:                 No sign of acute sinusitis. MASTOIDS:             No sign of acute inflammation. SKULL:                    No evidence for fracture or osseous abnormality. OTHER:                    Atherosclerosis     CONCLUSION: No acute intracranial hemorrhage. Chronic small vessel ischemic change within the deep white matter. If an acute infarct is of high clinical concern, recommend MRI of the brain for further evaluation. Electronically Verified and Signed by Attending Radiologist: Denice Patel MD 7/14/2025 8:55 AM Workstation: [id number]       ASSESSMENT / PLAN:      Patient is a 85 year old female with PMH sig for predm, here for not feeling well    Impression    -subacute CVA (R frontal lobe)  -HLD - new diagnosis  -pre dm   -obesity, bmi 34    Plan    -stroke work up  -MRA h/n pending  - - > started on statin  -A1c pending  - PT OT ST    -ISS prn  -check RLE doppler      Scds    Dispo - likely dc tomorrow      Further recommendations pending patient's clinical course. Abhinav hospitalist to continue to follow patient while in house    Patient and/or patient's family given opportunity to ask questions and note understanding and agreeing with therapeutic plan as outlined    [facility] Hospitalist  [phone]  Answering Service: [phone]           [1]   Past Medical History:   Anesthesia complication    hypotension     Back problem    cervical stenosis    Bowel obstruction (HCC)    Deep vein thrombosis (HCC)    right leg    Disorder of thyroid    Hearing impairment    no hearing aids    Hepatitis    54 years ago now aymptomatic    History of blood transfusion    age 50'S    HYPOTHYROIDISM    Pancreatitis (HCC)    Visual impairment    reading glasses   [2]   Past Surgical History:  Procedure Laterality Date          x 4    Colonoscopy  2011= Polyps    Colonoscopy  14= Diverticulosis    Repeat 2019    Colonoscopy  2014    Procedure: COLONOSCOPY;  Surgeon: Clinton Ye MD;  Location:  ENDOSCOPY    Endoscopic ultrasound - internal  2021    [place], CBD bx = inflammation, Neg malignancy    Ercp,diagnostic  2021    Endoscopic Retrograde Cholangiopancreatoscopy / EUS    Hysterectomy      Injection, w/wo contrast, dx/therapeutic substance, epidural/subarachnoid; cervical/thoracic N/A 3/29/2016    Procedure: CERVICAL EPIDURAL;  Surgeon: Deon Hernández MD;  Location: [facility]    Injection, w/wo contrast, dx/therapeutic substance, epidural/subarachnoid; cervical/thoracic N/A 2016    Procedure: CERVICAL EPIDURAL;  Surgeon: Deon Hernández MD;  Location: [facility] PAIN MANAGEMENT    Injection, w/wo contrast, dx/therapeutic substance, epidural/subarachnoid; cervical/thoracic N/A 2016    Procedure: CERVICAL EPIDURAL;  Surgeon: Deon Hernández MD;  Location: [facility]    Needle biopsy left  2012    Other Right     achilles tendon repair /foot sx x 2    Other  2016    ANTERIOR CERVICAL DISCECTOMY AND FUSION OF CERVICAL 4-CERVICAL 5 AND CERVICAL 5- CERVICAL 6 WITH ALLOGRAFT BONE AND MACHINED ALLOY PLATING    Other  3/30/2017    Anterior cervical wound exploration for redirection of the screws at Cervical 6 level, Dr. Perez    Patient documented not to have experienced any of the following events N/A 3/29/2016    Procedure: CERVICAL EPIDURAL;   Surgeon: Deon Hernández MD;  Location: [facility]    Patient documented not to have experienced any of the following events N/A 5/4/2016    Procedure: CERVICAL EPIDURAL;  Surgeon: Deon Hernández MD;  Location: [facility]    Patient documented not to have experienced any of the following events N/A 6/6/2016    Procedure: CERVICAL EPIDURAL;  Surgeon: Deon Hernández MD;  Location: [facility]    Patient withough preoperative order for iv antibiotic surgical site infection prophylaxis. N/A 3/29/2016    Procedure: CERVICAL EPIDURAL;  Surgeon: Deon Hernández MD;  Location: [facility]    Patient withough preoperative order for iv antibiotic surgical site infection prophylaxis. N/A 5/4/2016    Procedure: CERVICAL EPIDURAL;  Surgeon: Deon Hernández MD;  Location: [facility]    Patient withough preoperative order for iv antibiotic surgical site infection prophylaxis. N/A 6/6/2016    Procedure: CERVICAL EPIDURAL;  Surgeon: Deon Hernández MD;  Location: [facility] PAIN MANAGEMENT    Spine surgery procedure unlisted  1/2017    AT C6    Upper gi endoscopy,exam     [3]   Allergies  Allergen Reactions    Diphenhydramine SHORTNESS OF BREATH    Hydromorphone SHORTNESS OF BREATH    Penicillins HIVES    Vancomycin SHORTNESS OF BREATH    Adhesive Tape RASH and ITCHING    Dotarem [Gadoterate Meglumine] RASH and NAUSEA AND VOMITING    Radiology Contrast Iodinated Dyes RASH and NAUSEA AND VOMITING   [4]   Outpatient Medications Marked as Taking for the 7/14/25 encounter (Hospital Encounter)   Medication Sig Dispense Refill    CALCIUM + D3 OR Take 1 tablet by mouth daily.      metFORMIN 500 MG Oral Tab Take 1 tablet (500 mg total) by mouth daily.      Multiple Vitamin (TAB-A-REENA) Oral Tab Take 1 tablet by mouth in the morning.     [5]   Family History  Problem Relation Age of Onset    No Known Problems Father     No Known Problems  Mother     Cancer Brother

## 2025-07-14 NOTE — DISCHARGE PLANNING
Patient follow-up appointment information:     Visit Type: stroke   Date of TIA/Stroke: 7/14/2025  Type of Stroke: Ischemic   Patient to follow-up: 4-6 week   OP Neurologist: Any avail neurologist   New patient to neurology service: Yes   Anticipated discharge (if known): TBD   Discharge disposition (if known): TBD   Was this an ICU Stay?: No         RN Stroke Navigator team  [phone]

## 2025-07-14 NOTE — SPIRITUAL CARE NOTE
Spiritual Care Visit Note    Patient Name: Estefanía Melara Date of Spiritual Care Visit: 25   : 1/10/1940 Primary Dx: Acute CVA (cerebrovascular accident) (HCC)       Referred By: Referral From: Patient    Spiritual Care Taxonomy:    Intended Effects: Aligning care plan with patient's values    Methods: Collaborate with care team member, Assist with spiritual/Scientologist practices, Offer emotional support, Offer spiritual/Scientologist support    Interventions: Acknowledge current situation, Active listening, Connect someone with their redd community/clergy    Visit Type/Summary:     - Spiritual Care: Responded to a request for spiritual care and assessed patient for spiritual care needs. Consulted with RN prior to visit. At time of visit, patient was sitting up in chair visiting with her .  explored her spiritual/emotional needs and resources. Patient expressed her Gnosticism redd provides her with comfort or strength.  offered empathic listening and emotional support. Patient expressed appreciation for  visit. Provided support for Rick's spiritual/Scientologist requests. Coordinated Gnosticism Communion and verified NPO status. Provided information regarding how to contact Spiritual Care and left a Spiritual Care information card.  remains available as needed for follow up.    Spiritual Care support can be requested via an Epic consult. For urgent/immediate needs, please contact the On Call  at: Edmicky: ext [phone]    Rev Kalpana Max MA

## 2025-07-14 NOTE — ED QUICK NOTES
Orders for admission, patient is aware of plan and ready to go upstairs. Any questions, please call ED RN [phone]    Patient Covid vaccination status: Fully vaccinated     COVID Test Ordered in ED: None    COVID Suspicion at Admission: N/A    Running Infusions: Medication Infusions[1] None    Mental Status/LOC at time of transport: A&Ox3    Other pertinent information:   CIWA score: N/A   NIH score:  0            [1]

## 2025-07-15 ENCOUNTER — APPOINTMENT (OUTPATIENT)
Dept: ULTRASOUND IMAGING | Facility: HOSPITAL | Age: 85
DRG: 066 | End: 2025-07-15
Attending: HOSPITALIST
Payer: MEDICARE

## 2025-07-15 ENCOUNTER — APPOINTMENT (OUTPATIENT)
Dept: CV DIAGNOSTICS | Facility: HOSPITAL | Age: 85
DRG: 066 | End: 2025-07-15
Attending: Other
Payer: MEDICARE

## 2025-07-15 ENCOUNTER — APPOINTMENT (OUTPATIENT)
Dept: CV DIAGNOSTICS | Facility: HOSPITAL | Age: 85
End: 2025-07-15
Attending: Other
Payer: MEDICARE

## 2025-07-15 ENCOUNTER — APPOINTMENT (OUTPATIENT)
Dept: ULTRASOUND IMAGING | Facility: HOSPITAL | Age: 85
End: 2025-07-15
Attending: HOSPITALIST
Payer: MEDICARE

## 2025-07-15 LAB
ALBUMIN SERPL-MCNC: 3.9 G/DL (ref 3.2–4.8)
ALBUMIN/GLOB SERPL: 1.7 (ref 1–2)
ALP LIVER SERPL-CCNC: 52 U/L (ref 55–142)
ALT SERPL-CCNC: 12 U/L (ref 10–49)
ANION GAP SERPL CALC-SCNC: 5 MMOL/L (ref 0–18)
AST SERPL-CCNC: 18 U/L (ref ?–34)
BILIRUB SERPL-MCNC: 1.2 MG/DL (ref 0.2–1.1)
BUN BLD-MCNC: 10 MG/DL (ref 9–23)
CALCIUM BLD-MCNC: 8.8 MG/DL (ref 8.7–10.6)
CHLORIDE SERPL-SCNC: 108 MMOL/L (ref 98–112)
CO2 SERPL-SCNC: 30 MMOL/L (ref 21–32)
CREAT BLD-MCNC: 0.8 MG/DL (ref 0.55–1.02)
EGFRCR SERPLBLD CKD-EPI 2021: 73 ML/MIN/1.73M2 (ref 60–?)
ERYTHROCYTE [DISTWIDTH] IN BLOOD BY AUTOMATED COUNT: 13.2 %
GLOBULIN PLAS-MCNC: 2.3 G/DL (ref 2–3.5)
GLUCOSE BLD-MCNC: 100 MG/DL (ref 70–99)
GLUCOSE BLD-MCNC: 105 MG/DL (ref 70–99)
GLUCOSE BLD-MCNC: 106 MG/DL (ref 70–99)
GLUCOSE BLD-MCNC: 111 MG/DL (ref 70–99)
GLUCOSE BLD-MCNC: 99 MG/DL (ref 70–99)
HCT VFR BLD AUTO: 36.6 % (ref 35–48)
HGB BLD-MCNC: 12.4 G/DL (ref 12–16)
MAGNESIUM SERPL-MCNC: 1.8 MG/DL (ref 1.6–2.6)
MCH RBC QN AUTO: 30.7 PG (ref 26–34)
MCHC RBC AUTO-ENTMCNC: 33.9 G/DL (ref 31–37)
MCV RBC AUTO: 90.6 FL (ref 80–100)
OSMOLALITY SERPL CALC.SUM OF ELEC: 296 MOSM/KG (ref 275–295)
PLATELET # BLD AUTO: 189 10(3)UL (ref 150–450)
POTASSIUM SERPL-SCNC: 3.9 MMOL/L (ref 3.5–5.1)
PROT SERPL-MCNC: 6.2 G/DL (ref 5.7–8.2)
RBC # BLD AUTO: 4.04 X10(6)UL (ref 3.8–5.3)
SODIUM SERPL-SCNC: 143 MMOL/L (ref 136–145)
WBC # BLD AUTO: 4.9 X10(3) UL (ref 4–11)

## 2025-07-15 PROCEDURE — 93306 TTE W/DOPPLER COMPLETE: CPT | Performed by: OTHER

## 2025-07-15 PROCEDURE — 99232 SBSQ HOSP IP/OBS MODERATE 35: CPT

## 2025-07-15 PROCEDURE — 93971 EXTREMITY STUDY: CPT | Performed by: HOSPITALIST

## 2025-07-15 RX ORDER — MAGNESIUM OXIDE 400 MG/1
400 TABLET ORAL ONCE
Status: COMPLETED | OUTPATIENT
Start: 2025-07-15 | End: 2025-07-15

## 2025-07-15 RX ORDER — ALPRAZOLAM 0.25 MG
0.25 TABLET ORAL AS NEEDED
Status: DISCONTINUED | OUTPATIENT
Start: 2025-07-15 | End: 2025-07-16

## 2025-07-15 NOTE — PROGRESS NOTES
[facility]  [facility] Neurology Progress Note    Estefanía Melara Patient Status:  Inpatient    1/10/1940 MRN [id number]   Location [facility] 7NE-A Attending Clayton Kent, DO   Hosp Day # 1 PCP Nadia Cohen MD       Subjective:  Patient seen and examined for follow up visit, sitting up in the chair alert and oriented and responds to questions appropriately. Pt feels back to normal, has no complaints at this time. Awaiting MRA brain/neck.    MEDICATIONS:  Prior to Admission Medications[1]  Current Hospital Medications[2]    REVIEW OF SYSTEMS:  A 10-point system was reviewed.  Pertinent positives and negatives are noted in HPI.      PHYSICAL EXAMINATION:  VITAL SIGNS: /72 (BP Location: Left arm)   Pulse 75   Temp 98 °F (36.7 °C) (Oral)   Resp 20   Ht 59\"   Wt 169 lb 15.6 oz (77.1 kg)   SpO2 95%   BMI 34.33 kg/m²   GENERAL:  Patient is a 85 year old female in no acute distress.  HEENT:  Normocephalic, atraumatic  ABD: Soft, non tender  SKIN: Warm, dry, no rashes    NEUROLOGICAL:   Mental status: Oriented to person, place, situation and time. Regards and follows simple commands.  Speech: Fluent, no obvious aphasia or dysarthria  Memory and comprehension: Intact   Cranial Nerves: VFF, PERRL, conjugate gaze, facial sensation intact, face symmetric, tongue midline, shoulder shrug equal  Motor: No drift, no focal arm or leg weakness bilaterally. Motor exam 5/5 to all extremities.  Sensory: Intact to light touch bilaterally.  Coordination: FTN intact bilaterally  Gait: Deferred      Imaging/Diagnostics:  MRI BRAIN (CPT=70551)  Result Date: 2025  CONCLUSION: 1. Punctate focus of restricted diffusion in the right frontal lobe gyrus/subcortical white matter may represent a tiny focal subacute infarct. 2. Sequelae of moderate chronic small vessel ischemic disease is suggested. Critical results were discussed with Dr. Arredondo at 10:59 a.m. on 2025 Electronically Verified and  Signed by Attending Radiologist: Davie Ly MD 7/14/2025 11:00 AM Workstation: [id number]    CT BRAIN OR HEAD (CPT=70450)  Result Date: 7/14/2025  CONCLUSION: No acute intracranial hemorrhage. Chronic small vessel ischemic change within the deep white matter. If an acute infarct is of high clinical concern, recommend MRI of the brain for further evaluation. Electronically Verified and Signed by Attending Radiologist: Denice Patel MD 7/14/2025 8:55 AM Workstation: [id number]        Labs:  Recent Labs   Lab 07/14/25  0828 07/15/25  0625   RBC 4.69 4.04   HGB 14.7 12.4   HCT 40.6 36.6   MCV 86.6 90.6   MCH 31.3 30.7   MCHC 36.2 33.9   RDW 13.6 13.2   NEPRELIM 4.60  --    WBC 6.6 4.9   .0 189.0         Recent Labs   Lab 07/14/25  0828 07/15/25  0625   * 111*   BUN 13 10   CREATSERUM 0.88 0.80   EGFRCR 65 73   CA 9.5 8.8    143   K 4.0 3.9    108   CO2 27.0 30.0       Pre-morbid mRS 0       Assessment and Plan:    An 85 year old female with:    Subacute embolic cerebral ischemia.   - CT head negative for intracranial abnormality  - MRI brain reported punctate focus of restricted diffusion in the right frontal lobe gyrus/subcortical white matter.  - MRA brain and neck w/o ordered for further evaluation. Changed to STAT.   - ECHO report with 70-75% EF, no atrial level shunt  - Stroke labs: A1c 5.9,  goal less than 70. Pt currently ASA 81 mg daily and was started on Atorvastatin 80 mg daily for secondary stroke prevention.  - PT/OT/ST evals and recs appreciated  - Stroke education  - Pt will need prolonged outpatient cardiac monitor on discharge.    Plan of care reviewed with pt and family at bed side, all questions answered. Case discussed with nursing and Dr. Croft, further recommendations, if indicated, to follow.    Is this a shared or split note between Advanced Practice Provider and Physician? Yes     SHERIF Mcclellan  [facility]  7/15/2025, 10:58  AM  [place] # [id number]           [1]   No current outpatient medications on file.   [2]   Current Facility-Administered Medications   Medication Dose Route Frequency    sodium chloride 0.9% infusion   Intravenous Continuous    acetaminophen (Tylenol) tab 650 mg  650 mg Oral Q4H PRN    Or    acetaminophen (Tylenol) rectal suppository 650 mg  650 mg Rectal Q4H PRN    labetalol (Trandate) 5 mg/mL injection 10 mg  10 mg Intravenous Q10 Min PRN    hydrALAzine (Apresoline) 20 mg/mL injection 10 mg  10 mg Intravenous Q2H PRN    ondansetron (Zofran) 4 MG/2ML injection 4 mg  4 mg Intravenous Q6H PRN    metoclopramide (Reglan) 5 mg/mL injection 5 mg  5 mg Intravenous Q8H PRN    enoxaparin (Lovenox) 40 MG/0.4ML SUBQ injection 40 mg  40 mg Subcutaneous Daily    atorvastatin (Lipitor) tab 80 mg  80 mg Oral Nightly    aspirin chewable tab 81 mg  81 mg Oral Daily    acetaminophen (Tylenol Extra Strength) tab 500 mg  500 mg Oral Q6H PRN    melatonin tab 3 mg  3 mg Oral Nightly PRN    glucose (Dex4) 15 GM/59ML oral liquid 15 g  15 g Oral Q15 Min PRN    Or    glucose (Glutose) 40% oral gel 15 g  15 g Oral Q15 Min PRN    Or    glucose-vitamin C (Dex-4) chewable tab 4 tablet  4 tablet Oral Q15 Min PRN    Or    dextrose 50% injection 50 mL  50 mL Intravenous Q15 Min PRN    Or    glucose (Dex4) 15 GM/59ML oral liquid 30 g  30 g Oral Q15 Min PRN    Or    glucose (Glutose) 40% oral gel 30 g  30 g Oral Q15 Min PRN    Or    glucose-vitamin C (Dex-4) chewable tab 8 tablet  8 tablet Oral Q15 Min PRN    insulin aspart (NovoLOG) 100 Units/mL FlexPen 1-5 Units  1-5 Units Subcutaneous TID AC and HS

## 2025-07-15 NOTE — PHYSICAL THERAPY NOTE
PT attempted x2 today, in AM pt preoccupied w/ ECHO, in PM LES at US, will re-attempt as schedule allows.

## 2025-07-15 NOTE — SLP NOTE
ADULT INPATIENT SLP EVALUATION    ASSESSMENT    ASSESSMENT/OVERALL IMPRESSION:  Order received as per stroke protocol, chart reviewed. Patient is 85 y.o female who presented from home with AMS and difficulty with memory. Workup revealed subacute right frontal CVA and HLD.  Medical history includes DVT and cervical spondylosis. Patient lies at home with spouse.  Passed RN dysphagia screen and on regular texture diet with thin liquids.     Patient received awake, alert, and pleasant.  Multiple family members present. Patient with no focal oral motor deficit appreciated. Volitional cough and swallow WFL. Vocal quality clear.  Patient able to self administer PO trials independently.      Intact oropharyngeal swallow with no overt clinical s/s aspiration or dysphagia appreciated. Con't PO diet as tolerated. No dysphagia follow-up indicated.     Cognitive communication evaluation completed.  Patient was administered the [facility] Mental Status Exam (UMS) which is an exam for detecting mild cognitive impairment and dementia. Patient achieved a raw score of 27/30  (Normal= 27-30; Mild= 21-26; Dementia= 1-20) which was WNL.  Patient exhibited fluent language with no evidence of paraphasias.  Motor speech intact with 100% speech intelligibility.  Patient able to recall recent events with functional content and detail.  Patient felt she was at baseline and family endorsed the same.  Completed education regarding SLP role and purpose of evaluation, as well as potential effects from acute/subacute CVA.  Patient/family expressed understanding and appreciation.   No further skilled SLP follow-up indicated.       RECOMMENDATIONS   Diet Recommendations - Solids: Regular  Diet Recommendations - Liquids: Thin Liquids  Medication Administration Recommendations: No restrictions  Treatment Plan/Recommendations: No further inpatient SLP service warranted    HISTORY   MEDICAL HISTORY  Reason for Referral: Stroke  protocol    Problem List  Principal Problem:    Acute CVA (cerebrovascular accident) (HCC)      Past Medical History  Past Medical History[1]    Prior Living Situation: Home with support  Diet Prior to Admission: Regular, Thin liquids       Patient/Family Goals: \"I feel better today\"    SWALLOWING HISTORY  Current Diet Consistency: Regular, Thin liquids  Dysphagia History: none  Imaging Results: as above        OBJECTIVE   ORAL MOTOR EXAMINATION  Dentition: Functional  Symmetry: Within Functional Limits  Strength: Within Functional Limits  Tone: Within Functional Limits  Range of Motion: Within Functional Limits  Rate of Motion: Within Functional Limits    Voice Quality: Clear  Respiratory Status: Unlabored  Consistencies Trialed: Thin liquids, Hard solid  Method of Presentation: Self presentation  Patient Positioned: Upright, Midline, Bedside chair    Oral Phase of Swallow: Within Functional Limits        Pharyngeal Phase of Swallow: Within Functional Limits           (Please note: Silent aspiration cannot be evaluated clinically. Videofluoroscopic Swallow Study is required to rule-out silent aspiration.)    Esophageal Phase of Swallow: No complaints consistent with possible esophageal involvement          FOLLOW UP  Treatment Plan/Recommendations: No further inpatient SLP service warranted     Follow Up Needed (Documentation Required): No       Thank you for your referral.   If you have any questions, please contact PAUL Howard MS CCC-SLP/L  Speech-Language Pathologist  [facility] [phone]         [1]   Past Medical History:   Anesthesia complication    hypotension    Back problem    cervical stenosis    Bowel obstruction (HCC)    Deep vein thrombosis (HCC)    right leg    Disorder of thyroid    Hearing impairment    no hearing aids    Hepatitis    54 years ago now aymptomatic    History of blood transfusion    age 50'S    HYPOTHYROIDISM    Pancreatitis (HCC)    Visual impairment    reading  glasses

## 2025-07-15 NOTE — CONSULTS
[facility]  ALICE Neurology Consult Note    Estefanía Melara Patient Status:  Observation    1/10/1940 MRN [id number]   Location [facility] 7NE-A Attending Clayton Kent, DO   Hosp Day # 0 PCP Nadia Cohen MD     REASON FOR EVALUATION:  \"Nothing seems right \"    HISTORY OF PRESENT ILLNESS:  Ms. Melara is an 85-year-old woman with a history of DVT and cervical spondylotic disease who was brought to the hospital because \"nothing was right\" over the weekend.  She better describes this as feeling \"confused\" on Friday in particular, questioning herself through the day and forgetting things like if she had eaten breakfast or not, having to ask her  for reminders.  She decided to \"take it easy\" on Saturday and then she felt \"better\" on , although apparently not quite normal.  She is unable to provide further detail.  Nothing like this has happened before.  She does not think she has any thinking or memory problems at baseline, nor does her .  She denies any weakness or numbness on the body at any point.    PAST MEDICAL HISTORY:  Past Medical History:    Anesthesia complication    hypotension    Back problem    cervical stenosis    Bowel obstruction (HCC)    Deep vein thrombosis (HCC)    right leg    Disorder of thyroid    Hearing impairment    no hearing aids    Hepatitis    54 years ago now aymptomatic    History of blood transfusion    age 50'S    HYPOTHYROIDISM    Pancreatitis (HCC)    Visual impairment    reading glasses       PAST SURGICAL HISTORY:  Past Surgical History:   Procedure Laterality Date          x 4    Colonoscopy  = Polyps    Colonoscopy  14= Diverticulosis    Repeat 2019    Colonoscopy  2014    Procedure: COLONOSCOPY;  Surgeon: Clinton Ye MD;  Location:  ENDOSCOPY    Endoscopic ultrasound - internal  2021    Katwala, CBD bx = inflammation, Neg malignancy    Ercp,diagnostic  2021    Endoscopic Retrograde  Cholangiopancreatoscopy / EUS    Hysterectomy      Injection, w/wo contrast, dx/therapeutic substance, epidural/subarachnoid; cervical/thoracic N/A 3/29/2016    Procedure: CERVICAL EPIDURAL;  Surgeon: Deon Hernández MD;  Location: [facility]    Injection, w/wo contrast, dx/therapeutic substance, epidural/subarachnoid; cervical/thoracic N/A 5/4/2016    Procedure: CERVICAL EPIDURAL;  Surgeon: Deon Hernández MD;  Location: [facility]    Injection, w/wo contrast, dx/therapeutic substance, epidural/subarachnoid; cervical/thoracic N/A 6/6/2016    Procedure: CERVICAL EPIDURAL;  Surgeon: Deon Hernández MD;  Location: [facility]    Needle biopsy left  6/2012    Other Right     achilles tendon repair /foot sx x 2    Other  11/9/2016    ANTERIOR CERVICAL DISCECTOMY AND FUSION OF CERVICAL 4-CERVICAL 5 AND CERVICAL 5- CERVICAL 6 WITH ALLOGRAFT BONE AND MACHINED ALLOY PLATING    Other  3/30/2017    Anterior cervical wound exploration for redirection of the screws at Cervical 6 level, Dr. Perez    Patient documented not to have experienced any of the following events N/A 3/29/2016    Procedure: CERVICAL EPIDURAL;  Surgeon: Deon Hernández MD;  Location: [facility]    Patient documented not to have experienced any of the following events N/A 5/4/2016    Procedure: CERVICAL EPIDURAL;  Surgeon: Deon Hernández MD;  Location: [facility]    Patient documented not to have experienced any of the following events N/A 6/6/2016    Procedure: CERVICAL EPIDURAL;  Surgeon: Deon Hernández MD;  Location: [facility]    Patient withough preoperative order for iv antibiotic surgical site infection prophylaxis. N/A 3/29/2016    Procedure: CERVICAL EPIDURAL;  Surgeon: Deon Hernández MD;  Location: [facility]    Patient withough preoperative order for iv antibiotic surgical site infection prophylaxis. N/A  5/4/2016    Procedure: CERVICAL EPIDURAL;  Surgeon: Deon Hernández MD;  Location: [facility]    Patient withough preoperative order for iv antibiotic surgical site infection prophylaxis. N/A 6/6/2016    Procedure: CERVICAL EPIDURAL;  Surgeon: Deon Hernández MD;  Location: [facility]    Spine surgery procedure unlisted  1/2017    AT C6    Upper gi endoscopy,exam         FAMILY HISTORY:  family history includes Cancer in her brother; No Known Problems in her father and mother.    SOCIAL HISTORY:   reports that she has never smoked. She has never used smokeless tobacco. She reports that she does not currently use alcohol. She reports that she does not use drugs.    ALLERGIES:  Allergies   Allergen Reactions    Diphenhydramine SHORTNESS OF BREATH    Hydromorphone SHORTNESS OF BREATH    Penicillins HIVES    Vancomycin SHORTNESS OF BREATH    Adhesive Tape RASH and ITCHING    Dotarem [Gadoterate Meglumine] RASH and NAUSEA AND VOMITING    Radiology Contrast Iodinated Dyes RASH and NAUSEA AND VOMITING       MEDICATIONS:  No current outpatient medications on file.     Current Facility-Administered Medications   Medication Dose Route Frequency    sodium chloride 0.9% infusion   Intravenous Continuous    acetaminophen (Tylenol) tab 650 mg  650 mg Oral Q4H PRN    Or    acetaminophen (Tylenol) rectal suppository 650 mg  650 mg Rectal Q4H PRN    labetalol (Trandate) 5 mg/mL injection 10 mg  10 mg Intravenous Q10 Min PRN    hydrALAzine (Apresoline) 20 mg/mL injection 10 mg  10 mg Intravenous Q2H PRN    ondansetron (Zofran) 4 MG/2ML injection 4 mg  4 mg Intravenous Q6H PRN    metoclopramide (Reglan) 5 mg/mL injection 5 mg  5 mg Intravenous Q8H PRN    enoxaparin (Lovenox) 40 MG/0.4ML SUBQ injection 40 mg  40 mg Subcutaneous Daily    atorvastatin (Lipitor) tab 80 mg  80 mg Oral Nightly    [START ON 7/15/2025] aspirin chewable tab 81 mg  81 mg Oral Daily    acetaminophen (Tylenol Extra  Strength) tab 500 mg  500 mg Oral Q6H PRN    melatonin tab 3 mg  3 mg Oral Nightly PRN    glucose (Dex4) 15 GM/59ML oral liquid 15 g  15 g Oral Q15 Min PRN    Or    glucose (Glutose) 40% oral gel 15 g  15 g Oral Q15 Min PRN    Or    glucose-vitamin C (Dex-4) chewable tab 4 tablet  4 tablet Oral Q15 Min PRN    Or    dextrose 50% injection 50 mL  50 mL Intravenous Q15 Min PRN    Or    glucose (Dex4) 15 GM/59ML oral liquid 30 g  30 g Oral Q15 Min PRN    Or    glucose (Glutose) 40% oral gel 30 g  30 g Oral Q15 Min PRN    Or    glucose-vitamin C (Dex-4) chewable tab 8 tablet  8 tablet Oral Q15 Min PRN    insulin aspart (NovoLOG) 100 Units/mL FlexPen 1-5 Units  1-5 Units Subcutaneous TID AC and HS       REVIEW OF SYSTEMS:  A 10-point system was reviewed.  Pertinent positives and negatives are noted in HPI.      PHYSICAL EXAMINATION:  VITAL SIGNS: /65 (BP Location: Left arm)   Pulse 71   Temp 97.8 °F (36.6 °C) (Oral)   Resp 20   Ht 59\"   Wt 170 lb (77.1 kg)   SpO2 95%   BMI 34.34 kg/m²   GENERAL:  Patient is a 85 year old female in no acute distress.    NEUROLOGICAL:   Mental status: Oriented to person, place, and time  Speech & Language: Fluent, no dysarthria  Comprehension: Intact  Cranial Nerves: conjugate gaze, no nystagmus, face symmetric, tongue midline  Motor: moves all extremities against gravity without asymmetry  Gait: Deferred    DIAGNOSTIC DATA:  Labs:  Recent Labs   Lab 07/14/25  0828   RBC 4.69   HGB 14.7   HCT 40.6   MCV 86.6   MCH 31.3   MCHC 36.2   RDW 13.6   NEPRELIM 4.60   WBC 6.6   .0         Recent Labs   Lab 07/14/25  0828   *   BUN 13   CREATSERUM 0.88   EGFRCR 65   CA 9.5      K 4.0      CO2 27.0         IMAGING:  MRI BRAIN (CPT=70551)  Result Date: 7/14/2025  CONCLUSION: 1. Punctate focus of restricted diffusion in the right frontal lobe gyrus/subcortical white matter may represent a tiny focal subacute infarct. 2. Sequelae of moderate chronic small vessel  ischemic disease is suggested. Critical results were discussed with Dr. Arredondo at 10:59 a.m. on July 14, 2025 Electronically Verified and Signed by Attending Radiologist: Davie Ly MD 7/14/2025 11:00 AM Workstation: [id number]    CT BRAIN OR HEAD (CPT=70450)  Result Date: 7/14/2025  CONCLUSION: No acute intracranial hemorrhage. Chronic small vessel ischemic change within the deep white matter. If an acute infarct is of high clinical concern, recommend MRI of the brain for further evaluation. Electronically Verified and Signed by Attending Radiologist: Denice Patel MD 7/14/2025 8:55 AM Workstation: [id number]        ASSESSMENT:  Ms. Melara is an 85-year-old woman with a history of DVT who experienced nonspecific fluctuating alteration in mentation over the weekend, right frontal subacute embolic cerebral ischemia that is likely incidental to her presenting symptoms.    PLAN:  Principal Problem:    Acute CVA (cerebrovascular accident) (HCC)  Although she has subacute cerebral ischemia on MRI brain I am not sure she \"had a stroke\" because whatever is on the scan is likely at least some weeks old and she has only had symptoms for several days.  I also cannot localize her symptoms to a punctate infarct in the right frontal lobe.    All that said, she should still be risk stratified for embolic cerebral ischemia, which we would do with head and neck vessel imaging and transthoracic echocardiography.  She will also likely need prolonged outpatient cardiac telephone entering.  Continue aspirin 81 mg daily for now.    She does not have obvious encephalopathic insults on screen of blood in urine.  She might benefit from neuropsychometric testing as an outpatient, as this may be a harbinger of cognitive impairment.    We are following.    Parish Croft MD

## 2025-07-15 NOTE — PROGRESS NOTES
CC: follow-up hospital admission ever    SUBJECTIVE:  Interval History:     Feels well, no symptoms would like to go home    OBJECTIVE:  Scheduled Meds: Scheduled Medications[1]  Continuous Infusions: Medication Infusions[2]  PRN Meds: PRN Medications[3]    PHYSICAL EXAM  Vital signs: Temp:  [97.8 °F (36.6 °C)-98.1 °F (36.7 °C)] 98 °F (36.7 °C)  Pulse:  [69-85] 75  Resp:  [17-20] 20  BP: (128-154)/(51-81) 137/72  SpO2:  [94 %-99 %] 95 %      GENERAL - NAD, AAO  EYES- sclera anicteric,   HENT- normocephalic   NECK - no JVD  CV- RRR  RESP -  normal resp effort  ABDOMEN- soft, NT/ND   EXT- mild R LE edema   PSYCH - normal mentation/ normal affect    Data Review:   Labs:   Recent Labs   Lab 07/14/25  0828 07/15/25  0625   WBC 6.6 4.9   HGB 14.7 12.4   MCV 86.6 90.6   .0 189.0       Recent Labs   Lab 07/14/25  0828 07/15/25  0625    143   K 4.0 3.9    108   CO2 27.0 30.0   BUN 13 10   CREATSERUM 0.88 0.80   CA 9.5 8.8   MG  --  1.8   * 111*       Recent Labs   Lab 07/14/25  0828 07/15/25  0625   ALT 13 12   AST 24 18   ALB 4.6 3.9       Recent Labs   Lab 07/14/25  0829 07/14/25  1436 07/14/25  1758 07/14/25  2133 07/15/25  0538   PGLU 119* 88 111* 105* 106*           ASSESSMENT/PLAN:  Patient is a 85 year old female with PMH sig for predm, here for not feeling well     Impression     -subacute CVA (R frontal lobe)  -HLD - new diagnosis  -pre dm   -obesity, bmi 34   -microscopic hematuria    Plan     -stroke work up  -MRA h/n pending  - - > started on statin  -A1c 5.9  - PT OT ST     -ISS prn  -check RLE doppler -- pending     Outpt fu with PCP / gu for microscopic hematuria, pt explained     Scds     Dispo - pending completion of tests         Will continue to follow while hospitalized. Please page me or the on-call hospitalist with questions or concerns.    Clayton La Hospitalist  [phone]  Answering Service: [phone]         [1]    enoxaparin  40 mg Subcutaneous  Daily    atorvastatin  80 mg Oral Nightly    aspirin  81 mg Oral Daily    insulin aspart  1-5 Units Subcutaneous TID AC and HS   [2]    sodium chloride 75 mL/hr at 07/15/25 0529   [3]   acetaminophen **OR** acetaminophen    labetalol    hydrALAzine    ondansetron    metoclopramide    acetaminophen    melatonin    glucose **OR** glucose **OR** glucose-vitamin C **OR** dextrose **OR** glucose **OR** glucose **OR** glucose-vitamin C

## 2025-07-15 NOTE — PLAN OF CARE
NURSING ADMISSION NOTE      Patient admitted via Wheelchair  Oriented to room.  Safety precautions initiated.  Bed in low position.  Call light in reach.    Oriented pt and  to room and unit.     Pt AxOx4, RA  Neuro assessment intact. Slight left facial droop noted but per pt and , droop is baseline.  Denies pain  Awaiting MRA, echo, PT/OT/ST to see   at bedside  Pt and  updated on POC

## 2025-07-15 NOTE — CM/SW NOTE
07/15/25 1100   CM/SW Referral Data   Referral Source Physician   Reason for Referral Discharge planning   Informant EMR;Patient   Medical Hx   Does patient have an established PCP? Yes  (Dr. Loja, Nadia GALARZA MD)   Significant Past Medical/Mental Health Hx Cervical stenosis, hypothyroidism, hepatitis, DVT, Acute CVA   Patient Info   Advanced directives? No   Patient's Current Mental Status at Time of Assessment Alert   Patient's Home Environment House   Number of Levels in Home 3   Patient lives with Spouse/Significant other   Patient Status Prior to Admission   Independent with ADLs and Mobility Yes   Services in place prior to admission DME/Supplies at home   Type of DME/Supplies Wheeled Walker;Straight Cane  (available but not using)   Discharge Needs   Anticipated D/C needs No anticipated discharge needs     CM met w/ pt and family to assess for DC needs. Pt lives with her sps of 65 yrs, Gerardo in a multi-level  home. Pt is independent with ADLs, using no ADs, and drives. Pt has a cane/wheeled walker available but does not use either. Pt denies concerns about returning home at DC.     Encouraged pt to reach out if any questions or concerns arise.     CM/SW will remain available for DC planning and/or support.     GAURAV WadeN, RN    [phone]

## 2025-07-15 NOTE — PLAN OF CARE
Assumed care at approx 1930.  Alert and oriented x 4. Stroke protocol, no new neuro changes.  On room air. normal sinus rhythm on tele.  denies pain.    Safety precautions maintained, patient reports needs met, call light in reach.      Problem: NEUROLOGICAL - ADULT  Goal: Achieves stable or improved neurological status  Description: INTERVENTIONS  - Assess for and report changes in neurological status  - Initiate measures to prevent increased intracranial pressure  - Maintain blood pressure and fluid volume within ordered parameters to optimize cerebral perfusion and minimize risk of hemorrhage  - Monitor temperature, glucose, and sodium. Initiate appropriate interventions as ordered  Outcome: Progressing  Goal: Absence of seizures  Description: INTERVENTIONS  - Monitor for seizure activity  - Administer anti-seizure medications as ordered  - Monitor neurological status  Outcome: Progressing  Goal: Remains free of injury related to seizure activity  Description: INTERVENTIONS:  - Maintain airway, patient safety  and administer oxygen as ordered  - Monitor patient for seizure activity, document and report duration and description of seizure to MD/LIP  - If seizure occurs, turn patient to side and suction secretions as needed  - Reorient patient post seizure  - Seizure pads on all 4 side rails  - Instruct patient/family to notify RN of any seizure activity  - Instruct patient/family to call for assistance with activity based on assessment  Outcome: Progressing  Goal: Achieves maximal functionality and self care  Description: INTERVENTIONS  - Monitor swallowing and airway patency with patient fatigue and changes in neurological status  - Encourage and assist patient to increase activity and self care with guidance from PT/OT  - Encourage visually impaired, hearing impaired and aphasic patients to use assistive/communication devices  Outcome: Progressing

## 2025-07-15 NOTE — PROGRESS NOTES
OT nydia received and chart reviewed. Pt occuped with ECHO in AM, off the floor for US in PM. Will follow up as able and appropriate.

## 2025-07-15 NOTE — PHYSICAL THERAPY NOTE
PHYSICAL THERAPY EVALUATION - INPATIENT     Room Number: 7623/7623-A  Evaluation Date: 7/15/2025  Type of Evaluation: Initial  Physician Order: PT Eval and Treat    Presenting Problem: subacute R frontal CVA  Co-Morbidities : predm  Reason for Therapy: Mobility Dysfunction and Discharge Planning    PHYSICAL THERAPY ASSESSMENT   Patient is a 85 year old female admitted 2025 for confusion which had resolved at time of PT assessment.  Prior to admission, patient's baseline is indep.  Patient is currently functioning at baseline with bed mobility, transfers, gait, and stair negotiation.     Patient has met all skilled IPPT goals at this time. Patient will be discharged from Physical Therapy services.  Please re-order if a new functional limitation presents during this admission.      PLAN DURING HOSPITALIZATION  Nursing Mobility Recommendation : 1 Assist  PT Device Recommendation: None            PHYSICAL THERAPY MEDICAL/SOCIAL HISTORY  History related to current admission: Patient is a 85 year old female admitted on 2025: Presented with \"not feeling well\", confused dx subacute R frontal CVA - possibly incidental per neuro, US (-) DVT    HOME SITUATION  Type of Home: House  Home Layout: Multi-level  Stairs to Enter : 1        Stairs to Bedroom:  (6-7)    Railing: Yes    Lives With: Spouse    Drives: Yes   Patient Regularly Uses: Cane, Rolling walker      Prior Level of [place]: indep, does not use any device, no hx of falls, enjoys participating in her bowling league     SUBJECTIVE  Pt reports all symptoms have resolved, does not have any recollection of yesterdays events but feels back to baseline     OBJECTIVE  Precautions:  (-180)  Fall Risk: Standard fall risk    WEIGHT BEARING RESTRICTION     PAIN ASSESSMENT  Ratin  Location: pt denies       COGNITION  Overall Cognitive Status:  WFL - within functional limits    RANGE OF MOTION AND STRENGTH ASSESSMENT  Upper extremity ROM and strength are  within functional limits     Lower extremity ROM is within functional limits     Lower extremity strength is within functional limits     BALANCE  Static Sitting: Good  Dynamic Sitting: Good  Static Standing: Good  Dynamic Standing: Good    ADDITIONAL TESTS  Additional Tests: Modified [place]              Modified [place]: 0                  ACTIVITY TOLERANCE           BP: 142/72  BP Location: Left arm  BP Method: Automatic  Patient Position: Sitting    O2 WALK       NEUROLOGICAL FINDINGS                        AM-PAC '6-Clicks' INPATIENT SHORT FORM - BASIC MOBILITY  How much difficulty does the patient currently have...  Patient Difficulty: Turning over in bed (including adjusting bedclothes, sheets and blankets)?: None   Patient Difficulty: Sitting down on and standing up from a chair with arms (e.g., wheelchair, bedside commode, etc.): None   Patient Difficulty: Moving from lying on back to sitting on the side of the bed?: None   How much help from another person does the patient currently need...   Help from Another: Moving to and from a bed to a chair (including a wheelchair)?: None   Help from Another: Need to walk in hospital room?: None   Help from Another: Climbing 3-5 steps with a railing?: A Little     AM-PAC Score:  Raw Score: 23   Approx Degree of Impairment: 11.2%   Standardized Score (AM-PAC Scale): 56.93   CMS Modifier (G-Code): CI    FUNCTIONAL ABILITY STATUS  Gait Assessment   Functional Mobility/Gait Assessment  Gait Assistance: Supervision  Distance (ft): 200  Assistive Device: None  Pattern: Shuffle  Stairs: Stairs  How Many Stairs: 5  Device: 1 Rail  Assist: Supervision  Pattern: Ascend and Descend  Ascend and Descend : Step to    Skilled Therapy Provided     Bed Mobility:  Rolling: indep  Supine to sit: indep   Sit to supine: indep     Transfer Mobility:  Sit to stand: indep   Stand to sit: indep  Gait = SBA mild gait abnormalities demonstrated likely 2/2 chronic knee ortho  deficits    Therapist's Comments: Discussed case with RN prior to session initiation. Pt agreeable to participation in therapy. Gait belt donned for out of bed mobility. Participated in gait with horizontal and vertical head turns w/o balance deficits. Educated on recommended stair navigation technique for knee pian - pt performed appropriately with SBA.       Exercise/Education Provided:  Functional activity tolerated    Patient End of Session: In bed, Needs met, Call light within reach, RN aware of session/findings, Hospital anti-slip socks, All patient questions and concerns addressed, Family present      Patient Evaluation Complexity Level:  History Low - no personal factors and/or co-morbidities   Examination of body systems Low -  addressing 1-2 elements   Clinical Presentation Low- Stable   Clinical Decision Making Low Complexity       PT Session Time: 17 minutes  Gait Training:  minutes  Therapeutic Activity:  minutes  Neuromuscular Re-education:  minutes  Therapeutic Exercise:  minutes

## 2025-07-15 NOTE — PLAN OF CARE
Assumed care at 0730  A/O x4  RA   Denies pain   Echo completed   US RLE completed   MRA pending   Call light within reach. Fall precautions in place   Pt and family updated on POC. All questions answered

## 2025-07-16 ENCOUNTER — APPOINTMENT (OUTPATIENT)
Dept: MRI IMAGING | Facility: HOSPITAL | Age: 85
End: 2025-07-16
Payer: MEDICARE

## 2025-07-16 ENCOUNTER — APPOINTMENT (OUTPATIENT)
Dept: MRI IMAGING | Facility: HOSPITAL | Age: 85
End: 2025-07-16
Attending: Other
Payer: MEDICARE

## 2025-07-16 ENCOUNTER — APPOINTMENT (OUTPATIENT)
Dept: MRI IMAGING | Facility: HOSPITAL | Age: 85
DRG: 066 | End: 2025-07-16
Payer: MEDICARE

## 2025-07-16 ENCOUNTER — APPOINTMENT (OUTPATIENT)
Dept: MRI IMAGING | Facility: HOSPITAL | Age: 85
DRG: 066 | End: 2025-07-16
Attending: Other
Payer: MEDICARE

## 2025-07-16 VITALS
DIASTOLIC BLOOD PRESSURE: 75 MMHG | BODY MASS INDEX: 34.27 KG/M2 | HEIGHT: 59 IN | SYSTOLIC BLOOD PRESSURE: 143 MMHG | TEMPERATURE: 98 F | RESPIRATION RATE: 16 BRPM | HEART RATE: 67 BPM | OXYGEN SATURATION: 97 % | WEIGHT: 170 LBS

## 2025-07-16 LAB
GLUCOSE BLD-MCNC: 104 MG/DL (ref 70–99)
GLUCOSE BLD-MCNC: 108 MG/DL (ref 70–99)
GLUCOSE BLD-MCNC: 87 MG/DL (ref 70–99)
MAGNESIUM SERPL-MCNC: 1.9 MG/DL (ref 1.6–2.6)

## 2025-07-16 PROCEDURE — 70549 MR ANGIOGRAPH NECK W/O&W/DYE: CPT | Performed by: OTHER

## 2025-07-16 PROCEDURE — 99232 SBSQ HOSP IP/OBS MODERATE 35: CPT

## 2025-07-16 PROCEDURE — 70544 MR ANGIOGRAPHY HEAD W/O DYE: CPT

## 2025-07-16 RX ORDER — ASPIRIN 81 MG/1
81 TABLET, CHEWABLE ORAL DAILY
Qty: 30 TABLET | Refills: 0 | Status: SHIPPED | COMMUNITY
Start: 2025-07-16

## 2025-07-16 RX ORDER — ATORVASTATIN CALCIUM 80 MG/1
80 TABLET, FILM COATED ORAL NIGHTLY
Qty: 30 TABLET | Refills: 0 | Status: SHIPPED | OUTPATIENT
Start: 2025-07-16

## 2025-07-16 RX ORDER — GADOTERATE MEGLUMINE 376.9 MG/ML
20 INJECTION INTRAVENOUS
Status: COMPLETED | OUTPATIENT
Start: 2025-07-16 | End: 2025-07-16

## 2025-07-16 RX ORDER — POLYETHYLENE GLYCOL 3350 17 G/17G
17 POWDER, FOR SOLUTION ORAL DAILY PRN
Status: DISCONTINUED | OUTPATIENT
Start: 2025-07-16 | End: 2025-07-16

## 2025-07-16 NOTE — PROGRESS NOTES
CC: follow-up hospital admission ever    SUBJECTIVE:  Interval History:     Feels well, no new symptoms  Seen this am with hopes of dc but apparently was unable to complete MRI last night, will need to redo    OBJECTIVE:  Scheduled Meds: Scheduled Medications[1]  Continuous Infusions: Medication Infusions[2]  PRN Meds: PRN Medications[3]    PHYSICAL EXAM  Vital signs: Temp:  [97.5 °F (36.4 °C)-98.2 °F (36.8 °C)] 97.5 °F (36.4 °C)  Pulse:  [57-97] 75  Resp:  [16] 16  BP: (130-152)/(62-86) 143/75  SpO2:  [92 %-100 %] 97 %      GENERAL - NAD, AAO  EYES- sclera anicteric,   HENT- normocephalic   NECK - no JVD  CV- RRR  RESP -  normal resp effort  ABDOMEN- soft, NT/ND   EXT- mild R LE edema   PSYCH - normal mentation/ normal affect    Data Review:   Labs:   Recent Labs   Lab 07/14/25  0828 07/15/25  0625   WBC 6.6 4.9   HGB 14.7 12.4   MCV 86.6 90.6   .0 189.0       Recent Labs   Lab 07/14/25  0828 07/15/25  0625 07/16/25  0606    143  --    K 4.0 3.9  --     108  --    CO2 27.0 30.0  --    BUN 13 10  --    CREATSERUM 0.88 0.80  --    CA 9.5 8.8  --    MG  --  1.8 1.9   * 111*  --        Recent Labs   Lab 07/14/25  0828 07/15/25  0625   ALT 13 12   AST 24 18   ALB 4.6 3.9       Recent Labs   Lab 07/15/25  1132 07/15/25  1609 07/15/25  2130 07/16/25  0519 07/16/25  1121   PGLU 105* 99 100* 108* 104*           ASSESSMENT/PLAN:  Patient is a 85 year old female with PMH sig for predm, here for not feeling well     Impression     -subacute CVA (R frontal lobe)  -HLD - new diagnosis  -pre dm   -obesity, bmi 34   -microscopic hematuria    Plan     -stroke work up  -MRA h/n pending  - - > started on statin  -A1c 5.9  - PT OT ST     -ISS prn  -check RLE doppler -- neg     Outpt fu with PCP / gu for microscopic hematuria, pt explained     Scds     Dispo - pending completion of tests         Will continue to follow while hospitalized. Please page me or the on-call hospitalist with questions or  concerns.    Clayton La Hospitalist  [phone]  Answering Service: [phone]         [1]    enoxaparin  40 mg Subcutaneous Daily    atorvastatin  80 mg Oral Nightly    aspirin  81 mg Oral Daily    insulin aspart  1-5 Units Subcutaneous TID AC and HS   [2]    sodium chloride Stopped (07/16/25 0800)   [3]   polyethylene glycol (PEG 3350)    ALPRAZolam    acetaminophen **OR** acetaminophen    labetalol    hydrALAzine    ondansetron    metoclopramide    acetaminophen    melatonin    glucose **OR** glucose **OR** glucose-vitamin C **OR** dextrose **OR** glucose **OR** glucose **OR** glucose-vitamin C

## 2025-07-16 NOTE — PROGRESS NOTES
[facility]  ALICE Neurology Progress Note    Estefanía Melara Patient Status:  Inpatient    1/10/1940 MRN [id number]   Location [facility] 7NE-A Attending Clayton Kent, DO   Hosp Day # 2 PCP Nadia Cohen MD     CC: acute stroke    Subjective: Seen for a follow up visit today, reports feeling well. No complaints at this time, wants to go home. No new focal neuro deficit noted.        MEDICATIONS:  Prior to Admission Medications[1]  Current Hospital Medications[2]    REVIEW OF SYSTEMS:  A 10-point system was reviewed.  Pertinent positives and negatives are noted in HPI.      PHYSICAL EXAMINATION:  VITAL SIGNS: /86 (BP Location: Left arm)   Pulse 67   Temp 97.5 °F (36.4 °C) (Temporal)   Resp 16   Ht 59\"   Wt 169 lb 15.6 oz (77.1 kg)   SpO2 95%   BMI 34.33 kg/m²   GENERAL:  Patient is a 85 year old female in no acute distress.  HEENT:  Normocephalic, atraumatic  ABD: Soft, non tender  SKIN: Warm, dry, no rashes    NEUROLOGICAL:   Mental status: Oriented to person, place, situation and time. Regards and follows simple commands.  Speech: Fluent, no obvious aphasia or dysarthria  Memory and comprehension: Intact   Cranial Nerves: VFF, PERRL, conjugate gaze, facial sensation intact, face symmetric, tongue midline, shoulder shrug equal  Motor: No drift, no focal arm or leg weakness bilaterally. Motor exam 5/5 to all extremities.  Sensory: Intact to light touch bilaterally.  Coordination: FTN intact bilaterally  Gait: Deferred      Imaging/Diagnostics:  US VENOUS DOPPLER LEG RIGHT - DIAG IMG (CPT=93971)  Result Date: 7/15/2025  CONCLUSION: Right proximal posterior tibial vein is not identified, otherwise no evidence of deep vein thrombus in the right lower extremity. Electronically Verified and Signed by Attending Radiologist: Aurora Kelley MD 7/15/2025 2:04 PM Workstation: [id number]    MRI BRAIN (CPT=70551)  Result Date: 2025  CONCLUSION: 1. Punctate focus of restricted  diffusion in the right frontal lobe gyrus/subcortical white matter may represent a tiny focal subacute infarct. 2. Sequelae of moderate chronic small vessel ischemic disease is suggested. Critical results were discussed with Dr. Arredondo at 10:59 a.m. on July 14, 2025 Electronically Verified and Signed by Attending Radiologist: Davie Ly MD 7/14/2025 11:00 AM Workstation: [id number]    CT BRAIN OR HEAD (CPT=70450)  Result Date: 7/14/2025  CONCLUSION: No acute intracranial hemorrhage. Chronic small vessel ischemic change within the deep white matter. If an acute infarct is of high clinical concern, recommend MRI of the brain for further evaluation. Electronically Verified and Signed by Attending Radiologist: Denice Patel MD 7/14/2025 8:55 AM Workstation: [id number]        Labs:  Recent Labs   Lab 07/14/25  0828 07/15/25  0625   RBC 4.69 4.04   HGB 14.7 12.4   HCT 40.6 36.6   MCV 86.6 90.6   MCH 31.3 30.7   MCHC 36.2 33.9   RDW 13.6 13.2   NEPRELIM 4.60  --    WBC 6.6 4.9   .0 189.0         Recent Labs   Lab 07/14/25  0828 07/15/25  0625   * 111*   BUN 13 10   CREATSERUM 0.88 0.80   EGFRCR 65 73   CA 9.5 8.8    143   K 4.0 3.9    108   CO2 27.0 30.0       Pre-morbid mRS 0        Assessment and Plan:     An 85 year old female with:     Subacute embolic cerebral ischemia.              - CT head negative for intracranial abnormality  - MRI brain reported punctate focus of restricted diffusion in the right frontal lobe gyrus/subcortical white matter.  - MRA brain and neck negative for any intracranial abnormality, no occlusion, dissection, or flow-limiting stenosis in the cervical vertebral or carotid arteries.               - ECHO report with 70-75% EF, no atrial level shunt  - Stroke labs: A1c 5.9,  goal less than 70. Pt currently ASA 81 mg daily and was started on Atorvastatin 80 mg daily for secondary stroke prevention.  - PT/OT/ST evals and recs appreciated  - Stroke education  -  ZIO patch on discharge for 28 days.     Plan of care reviewed with pt and family at bed side, all questions answered. No further inpatient neuro interventions at this time, please feel free to call with any neuro changes, pt to follow up with neurology clinic in 4 weeks after discharge. Case discussed with nursing and Dr. Croft, further recommendations, if indicated, to follow.       Is this a shared or split note between Advanced Practice Provider and Physician? Yes     Crystal Henning, APRN  [facility]  7/16/2025, 11:06 AM  [place] # [id number]         [1]   Prior to Admission Medications   Medication Sig    aspirin 81 MG Oral Chew Tab Chew 1 tablet (81 mg total) by mouth daily.    atorvastatin 80 MG Oral Tab Take 1 tablet (80 mg total) by mouth nightly.   [2]   Current Facility-Administered Medications   Medication Dose Route Frequency    polyethylene glycol (PEG 3350) (Miralax) 17 g oral packet 17 g  17 g Oral Daily PRN    ALPRAZolam (Xanax) tab 0.25 mg  0.25 mg Oral PRN    sodium chloride 0.9% infusion   Intravenous Continuous    acetaminophen (Tylenol) tab 650 mg  650 mg Oral Q4H PRN    Or    acetaminophen (Tylenol) rectal suppository 650 mg  650 mg Rectal Q4H PRN    labetalol (Trandate) 5 mg/mL injection 10 mg  10 mg Intravenous Q10 Min PRN    hydrALAzine (Apresoline) 20 mg/mL injection 10 mg  10 mg Intravenous Q2H PRN    ondansetron (Zofran) 4 MG/2ML injection 4 mg  4 mg Intravenous Q6H PRN    metoclopramide (Reglan) 5 mg/mL injection 5 mg  5 mg Intravenous Q8H PRN    enoxaparin (Lovenox) 40 MG/0.4ML SUBQ injection 40 mg  40 mg Subcutaneous Daily    atorvastatin (Lipitor) tab 80 mg  80 mg Oral Nightly    aspirin chewable tab 81 mg  81 mg Oral Daily    acetaminophen (Tylenol Extra Strength) tab 500 mg  500 mg Oral Q6H PRN    melatonin tab 3 mg  3 mg Oral Nightly PRN    glucose (Dex4) 15 GM/59ML oral liquid 15 g  15 g Oral Q15 Min PRN    Or    glucose (Glutose) 40% oral gel 15 g  15 g Oral Q15  Min PRN    Or    glucose-vitamin C (Dex-4) chewable tab 4 tablet  4 tablet Oral Q15 Min PRN    Or    dextrose 50% injection 50 mL  50 mL Intravenous Q15 Min PRN    Or    glucose (Dex4) 15 GM/59ML oral liquid 30 g  30 g Oral Q15 Min PRN    Or    glucose (Glutose) 40% oral gel 30 g  30 g Oral Q15 Min PRN    Or    glucose-vitamin C (Dex-4) chewable tab 8 tablet  8 tablet Oral Q15 Min PRN    insulin aspart (NovoLOG) 100 Units/mL FlexPen 1-5 Units  1-5 Units Subcutaneous TID AC and HS

## 2025-07-16 NOTE — PLAN OF CARE
Assumed care at 0730  Orientated x4, NSR, RA   Denies pain     Nq4; no new neuro deficits   BP within parameters   MRA brain and neck completed   Up to hallway and bathroom   Needs met     Plan for possible discharge if cleared by consults         NURSING DISCHARGE NOTE    Discharged Home via Wheelchair.  Accompanied by Support staff  Belongings Taken by patient/family.    All consults cleared for discharge   Patient and spouse educated on discharge paperwork   Ziopatch placed, registration completed, paperwork and consent sent to cardiac diagnostic   No further questions from patient or family

## 2025-07-16 NOTE — DISCHARGE SUMMARY
[facility] Internal Medicine Hospitalist Discharge Summary     Patient ID:  Estefanía Melara  85 year old  1/10/1940    Admission Date/Time  7/14/2025  8:22 AM  Discharge Date  07/16/25    PCP  Nadia Cohen MD     Discharging Hospitalist:  Clayton Kent DO    Disposition:  home    Follow Up Appointments  Nadia Loja MD  [address] [address]  [phone]    Schedule an appointment as soon as possible for a visit on 7/21/2025  at 1140      Primary Hospital Problems/Hospital Course Summary  stroke    Medication Changes  Started on Lipitor 80 mg every day and Aspirin 81 mg qd    Important Follow Up Items  Labs:    Incidental Findings: microscopic hematuria     Procedures/Diagnostics       Operative Procedures:        Change in Code Status:        Hospital Course/Secondary Diagnoses:      Patient is a 85 year old female with PMH sig for predm, here for not feeling well     Impression     -subacute CVA (R frontal lobe)  -HLD - new diagnosis  -pre dm   -obesity, bmi 34   -microscopic hematuria     Plan     -stroke work up  -MRA h/n wo sig stenosis  - - > started on Lipitor 80 mg   -A1c 5.9, cont home metformin    RLE edema noted - no DVT on US    Outpt fu with PCP / gu for microscopic hematuria, pt explained    Consults: IP CONSULT TO NEUROLOGY  IP CONSULT TO HOSPITALIST  IP CONSULT TO SOCIAL WORK    Discharge Medications       Medication List        ASK your doctor about these medications      CALCIUM + D3 OR     metFORMIN 500 MG Tabs  Commonly known as: Glucophage  Ask about: Which instructions should I use?     Tab-A-Qiana Tabs            I reconciled current and discharge medications on the day of discharge.    Imaging/Diagnostic Reports  US VENOUS DOPPLER LEG RIGHT - DIAG IMG (CPT=93971)  Result Date: 7/15/2025  PROCEDURE: US VENOUS DOPPLER LEG RIGHT - DIAG IMG (CPT=93971) INDICATIONS: eval  for dvt , r leg swellng PATIENT STATED HISTORY: Right lower extremity edema COMPARISON: No comparisons. TECHNIQUE:  Real time, grey scale, and duplex ultrasound was used to evaluate the lower extremity venous system. B-mode two-dimensional images of the vascular structures, Doppler spectral analysis, and color flow.  Doppler imaging were performed.  The following veins were imaged:  Common, deep, and superficial femoral, popliteal, sapheno-femoral junction, posterior tibial veins, and the contralateral common femoral vein. FINDINGS: SAPHENOFEMORAL JUNCTION: No reflux. THROMBI: None visible. COMPRESSION: Normal compressibility, phasicity, and augmentation. OTHER: Negative.     CONCLUSION: Right proximal posterior tibial vein is not identified, otherwise no evidence of deep vein thrombus in the right lower extremity. Electronically Verified and Signed by Attending Radiologist: Aurora Kelley MD 7/15/2025 2:04 PM Workstation: [id number] [id number] W DOPPLER/BUBBLES (CPT=93306)  Result Date: 7/15/2025  Transthoracic Echocardiogram Name:Estefanía Melara Date: 07/15/2025 :  01/10/1940 Ht:  (59in)  BP: 137 / 72 MRN:  [id number]    Age:  85years    Wt:  (169lb) HR: 85bpm Loc:  [facility]       Gndr: F          BSA: 1.72m^2 Sonographer: Dionisio CACERES Ordering:    Parish Croft Consulting:  Marivel Cui ---------------------------------------------------------------------------- History/Indications:   Cerebrovascular accident. ---------------------------------------------------------------------------- Procedure information:  A transthoracic complete 2D study was performed. Additional evaluation included M-mode, complete spectral Doppler, and color Doppler.  Patient status:  Inpatient.  Location:  Bedside.    No prior study was available for comparison.    This was a routine study. Transthoracic echocardiography for ventricular function evaluation and assessment of valvular function. Image quality was adequate. ECG rhythm:    Normal sinus ---------------------------------------------------------------------------- Conclusions: 1. Left ventricle: The cavity size was normal. Wall thickness was normal.    Systolic function was hyperdynamic. The estimated ejection fraction was    70-75%, by visual assessment. No diagnostic evidence for regional wall    motion abnormalities. Doppler parameters are consistent with abnormal    left ventricular relaxation - grade 1 diastolic dysfunction. 2. Left atrium: The atrium was moderately to markedly dilated. The left    atrial volume was moderately to markedly increased. 3. Right atrium: The atrium was mildly to moderately dilated. 4. Atrial septum: Echo contrast study showed no shunt. 5. Mitral valve: The annulus was moderately calcified. There was mild    regurgitation. Impressions:  No previous study was available for comparison. * ---------------------------------------------------------------------------- * Findings: Left ventricle:  The cavity size was normal. Wall thickness was normal. Systolic function was hyperdynamic. The estimated ejection fraction was 70-75%, by visual assessment. No diagnostic evidence for regional wall motion abnormalities. Doppler parameters are consistent with abnormal left ventricular relaxation - grade 1 diastolic dysfunction. Left atrium:  The atrium was moderately to markedly dilated. The left atrial volume was moderately to markedly increased. Right ventricle:  The cavity size was normal. Systolic function was normal. Right atrium:  The atrium was mildly to moderately dilated. Mitral valve:  The annulus was moderately calcified. Leaflet separation was normal.  Doppler:  Transvalvular velocity was within the normal range. There was no evidence for stenosis. There was mild regurgitation. Aortic valve:   The valve was trileaflet. The leaflets were mildly to moderately thickened and mildly calcified. Cusp separation was normal. Doppler:  Transvalvular velocity was within  the normal range. There was no evidence for stenosis. There was no significant regurgitation. Tricuspid valve:  The valve is structurally normal. Leaflet separation was normal.  Doppler:  Transvalvular velocity was within the normal range. There was no evidence for stenosis. There was no significant regurgitation. Pulmonic valve:   The valve is structurally normal. Cusp separation was normal.  Doppler:  Transvalvular velocity was within the normal range. There was no evidence for stenosis. There was no significant regurgitation. Pericardium:   There was no pericardial effusion. Aorta: Aortic root: The aortic root was normal. Systemic veins:  Central venous respirophasic diameter changes are in the normal range (>50%). Inferior vena cava: The IVC was normal-sized. Atrial septum:   Echo contrast study showed no shunt. ---------------------------------------------------------------------------- Measurements  Left ventricle                  Value        Ref  IVS thickness, ED, PLAX     (H) 1.3   cm     0.6 - 0.9  LV ID, ED, PLAX                 4.7   cm     3.8 - 5.2  LV ID, ES, PLAX                 2.8   cm     2.2 - 3.5  LV PW thickness, ED, PLAX   (H) 1.1   cm     0.6 - 0.9  IVS/LV PW ratio, ED, PLAX       1.17         ---------  LV PW/LV ID ratio, ED, PLAX     0.24         ---------  LV ejection fraction            70    %      54 - 74  LV e', lateral              (L) 6.8   cm/sec >=10.0  LV E/e', lateral                13           <=13  LV e', medial               (L) 5.8   cm/sec >=7.0  LV E/e', medial                 15           ---------  LV e', average                  6.3   cm/sec ---------  LV E/e', average                14           <=14  Aortic root                     Value        Ref  Aortic root ID, ED              3.3   cm     2.5 - 3.9  Left atrium                     Value        Ref  LA ID, A-P, ES              (H) 3.9   cm     2.7 - 3.8  LA volume, S                (H) 110   ml     22 - 52  LA  volume/bsa, S            (H) 64    ml/m^2 16 - 34  LA volume, ES, 1-p A4C      (H) 100   ml     22 - 52  LA volume, ES, 1-p A2C      (H) 109   ml     22 - 52  LA volume, ES, A/L              114   ml     ---------  LA volume/bsa, ES, A/L      (H) 67    ml/m^2 16 - 34  LA/aortic root ratio            1.18         ---------  Mitral valve                    Value        Ref  Mitral E-wave peak velocity     0.88  m/sec  ---------  Mitral A-wave peak velocity     1.41  m/sec  ---------  Mitral peak gradient, D         3     mm Hg  ---------  Mitral E/A ratio, peak          0.6          ---------  Right ventricle                 Value        Ref  TAPSE, 2D                       2.10  cm     >=1.70 Legend: (L)  and  (H)  gisel values outside specified reference range. ---------------------------------------------------------------------------- Prepared and electronically signed by Rikki Tillman MD 07/15/2025 11:03     MRI BRAIN (HNG=53167)  Result Date: 7/14/2025  PROCEDURE: MRI BRAIN (CPT=70551) INDICATIONS: Confusion/forgetting events last few days PATIENT STATED HISTORY: COMPARISON: 05/23/2022 MRI BRAIN (CPT=70551) TECHNIQUE: MRI of the brain was performed with multi-planar T1, T2-weighted images with FLAIR sequences and diffusion weighted images without contrast. FINDINGS: MIDLINE STRUCTURES: Midline structures including the corpus callosum, optic chiasm and cerebellar tonsils are unremarkable. INTRACRANIAL: Punctate focus of high signal on diffusion-weighted imaging in the right frontal lobe (image 21) is noted. Moderate FLAIR normalities in the white matter are noted. VENTRICLES/SULCI: Prominence of the subarachnoid spaces noted. No mass effect. No hemorrhage or extra-axial fluid collection is identified. Chronic hemosiderin deposition is suggested in the right frontal lobe sulcus. SINUSES/ORBITS: The visualized paranasal sinuses are clear. The orbits are unremarkable. MASTOIDS: The mastoids are clear.  OTHER:Negative.     CONCLUSION: 1. Punctate focus of restricted diffusion in the right frontal lobe gyrus/subcortical white matter may represent a tiny focal subacute infarct. 2. Sequelae of moderate chronic small vessel ischemic disease is suggested. Critical results were discussed with Dr. Arredondo at 10:59 a.m. on July 14, 2025 Electronically Verified and Signed by Attending Radiologist: Davie Ly MD 7/14/2025 11:00 AM Workstation: [id number]    CT BRAIN OR HEAD (CPT=70450)  Result Date: 7/14/2025  PROCEDURE: CT BRAIN OR HEAD (CPT=70450) INDICATIONS: Confusion COMPARISON: MRI of the brain performed 5/23/2022. TECHNIQUE: Noncontrast CT scanning is performed through the brain. Automated exposure control techniques for dose reduction were used. Dose information is transmitted to the ACR (American College of Radiology) NRDR (National Radiology Data Registry) which includes the Dose Index Registry. FINDINGS: VENTRICLES/SULCI: Ventricles and sulci are prominent in size consistent with volume loss. INTRACRANIAL:       There are no abnormal extraaxial fluid collections.  There is no midline shift.  There is no acute intracranial hemorrhage. Periventricular and subcortical low-attenuation is most consistent with chronic small vessel ischemic change within the deep white matter. SINUSES:                 No sign of acute sinusitis. MASTOIDS:             No sign of acute inflammation. SKULL:                    No evidence for fracture or osseous abnormality. OTHER:                    Atherosclerosis     CONCLUSION: No acute intracranial hemorrhage. Chronic small vessel ischemic change within the deep white matter. If an acute infarct is of high clinical concern, recommend MRI of the brain for further evaluation. Electronically Verified and Signed by Attending Radiologist: Denice Patel MD 7/14/2025 8:55 AM Workstation: [id number]        Patient instructions:      I as the attending physician reconciled the current and  discharge medications on day of discharge.     Current Discharge Medication List        CONTINUE these medications which have NOT CHANGED    Details   CALCIUM + D3 OR Take 1 tablet by mouth daily.      metFORMIN 500 MG Oral Tab Take 1 tablet (500 mg total) by mouth daily.      Multiple Vitamin (TAB-A-REENA) Oral Tab Take 1 tablet by mouth in the morning.                    Exam on day of discharge:     Vitals:    07/16/25 0800   BP: 131/86   Pulse: 67   Resp: 16   Temp: 97.5 °F (36.4 °C)       Physical Exam:   General: no acute distress  Heart: RRR  Lungs: CTAB  Abd: Soft, NT, ND  Neuro: no focal deficits      Total time coordinating care for discharge: Greater than 30 minutes    Clayton Kent DO

## 2025-07-16 NOTE — OCCUPATIONAL THERAPY NOTE
OCCUPATIONAL THERAPY EVALUATION - INPATIENT    Room Number: 7623/7623-A  Evaluation Date: 7/16/2025     Type of Evaluation: Initial  Presenting Problem: CVA    Physician Order: IP Consult to Occupational Therapy  Reason for Therapy:  ADL/IADL Dysfunction and Discharge Planning    OCCUPATIONAL THERAPY ASSESSMENT   Patient is a 85 year old female admitted on 7/14/2025 with Presenting Problem: CVA. Co-Morbidities : predm  Patient is currently functioning near baseline with toileting, bathing, upper body dressing, lower body dressing, bed mobility, and transfers.  Prior to admission, patient's baseline is independent.  Patient met all OT goals at supervision level.  Patient reports no further questions/concerns at this time.     Patient will benefit from continued skilled OT Services with no additional skilled services but increased support at home    Recommendations for nursing staff:   Transfers: supervision without a device   Toileting location: Toilet    EVALUATION SESSION:  Patient at start of session: Pt was found sitting in her chair with her spouse at the bedside.     FUNCTIONAL TRANSFER ASSESSMENT  Sit to Stand: Chair  Chair: Supervision  Toilet Transfer: Supervision    BED MOBILITY     BALANCE ASSESSMENT     FUNCTIONAL ADL ASSESSMENT  LB Dressing Standing: Minimal Assist  Toileting Seated: Supervision  Toileting Standing: Supervision    ACTIVITY TOLERANCE:                          O2 SATURATIONS       COGNITION  Overall Cognitive Status:  WFL - within functional limits    COGNITION ASSESSMENTS     Upper Extremity:   ROM: within functional limits   Strength: is within functional limits   Coordination:  Gross motor: symmetrical   Fine motor: symmetrical   Sensation: Light touch:  intact    EDUCATION PROVIDED  Patient Education : Role of Occupational Therapy; Plan of Care; Discharge Recommendations  Patient's Response to Education: Verbalized Understanding  Family/Caregiver Education : Role of Occupational  Therapy; Plan of Care; Discharge Recommendations  Family/Caregiver's Response to Education: Verbalized Understanding    Equipment used: gait belt   Demonstrates functional use    Therapist comments: sitting in chair>stand>pt's spouse assisted with LB dressing to doff socks before she donned her slippers>walk to bathroom>toileting>stand>walk within hallway>walk to sit in chair     PHQ9 administered   Therapist led patient to complete PHQ9, pt scored 6 on PHQ9. Deficits for poor sleep, poor appetite and decreased energy   Scores demonstrate:  0-4 - Min risk of depression  5-9 - Mild risk of depression  10-14 - Moderate risk of depression  15-19 - Moderately severe risk of depression  20-27 - Severe risk of depression    This score dictates SW to be placed, this order has been placed.     Patient End of Session: Up in chair, Needs met, Call light within reach, RN aware of session/findings, All patient questions and concerns addressed, Family present, Hospital anti-slip socks    OCCUPATIONAL PROFILE    HOME SITUATION  Type of Home: House  Home Layout: Multi-level  Lives With: Spouse               Occupation/Status: Retired     Drives: Yes  Patient Regularly Uses: Cane, Rolling walker    Prior Level of Function:  indep, does not use any device, no hx of falls, enjoys participating in her [employer] league        SUBJECTIVE  \"I need to have both of my knees done but I need to make a choice.\"     PAIN ASSESSMENT  Ratin  Location: N/A       OBJECTIVE  Precautions:  (-180)  Fall Risk: Standard fall risk    WEIGHT BEARING RESTRICTION       AM-PAC ‘6-Clicks’ Inpatient Daily Activity Short Form  -   Putting on and taking off regular lower body clothing?: A Little  -   Bathing (including washing, rinsing, drying)?: A Little  -   Toileting, which includes using toilet, bedpan or urinal? : A Little  -   Putting on and taking off regular upper body clothing?: None  -   Taking care of personal grooming such as brushing  teeth?: None  -   Eating meals?: None    AM-PAC Score:  Score: 21  Approx Degree of Impairment: 32.79%  Standardized Score (AM-PAC Scale): 44.27    ADDITIONAL TESTS     NEUROLOGICAL FINDINGS      PLAN   Patient has been evaluated and presents with no skilled Occupational Therapy needs at this time.  Patient discharged from Occupational Therapy services.  Please re-order if a new functional limitation presents during this admission.    OT Device Recommendations: None    Patient Evaluation Complexity Level:   Occupational Profile/Medical History MODERATE - Expanded review of history including review of medical or therapy record   Specific performance deficits impacting engagement in ADL/IADL MODERATE  3 - 5 performance deficits   Client Assessment/Performance Deficits MODERATE - Comorbidities and min to mod modifications of tasks    Clinical Decision Making MODERATE - Analysis of occupational profile, detailed assessments, several treatment options    Overall Complexity MODERATE     OT Session Time: 30 minutes  Therapeutic Activity: 15 minutes

## 2025-07-16 NOTE — PLAN OF CARE
Assumed care at 0730  A/O x4  RA   Denies pain   MRA semi- completed. Critical result, neuro notified  Call light within reach. Fall precautions in place   Pt and family updated on POC. All questions answered

## 2025-07-17 ENCOUNTER — HOSPITAL ENCOUNTER (OUTPATIENT)
Dept: CV DIAGNOSTICS | Facility: HOSPITAL | Age: 85
Discharge: HOME | End: 2025-07-17
Payer: MEDICARE

## 2025-07-17 ENCOUNTER — PATIENT OUTREACH (OUTPATIENT)
Dept: CASE MANAGEMENT | Age: 85
End: 2025-07-17

## 2025-07-17 DIAGNOSIS — I63.9 ACUTE CVA (CEREBROVASCULAR ACCIDENT) (HCC): ICD-10-CM

## 2025-07-17 PROCEDURE — 93229 REMOTE 30 DAY ECG TECH SUPP: CPT

## 2025-07-17 NOTE — PROGRESS NOTES
Hospital follow-up appt / Discharged 7/16 [facility]      PCP Request :  Dr. Nadia Loja  [place] [address] [facility] [address] [facility] [address] [phone]  Monday 7/21 @11:40am w/ Dr. Nadia Loja (existing appt)        Neuro / Stroke Request :  Dr. Kosta Bañuelos  [facility], [address] [facility] [id number] [phone]  Wednesday 8/27 @2:20pm w/ Dr. Kosta Bañuelos (existing appt)        Spoke with pt who was able to verify and confirm her scheduled PCP & Neuro/Stroke HFU appts.      No further assistance needed      Closing encounter

## 2025-08-27 ENCOUNTER — OFFICE VISIT (OUTPATIENT)
Dept: NEUROLOGY | Facility: CLINIC | Age: 85
End: 2025-08-27

## 2025-08-27 VITALS
RESPIRATION RATE: 16 BRPM | DIASTOLIC BLOOD PRESSURE: 78 MMHG | HEART RATE: 70 BPM | BODY MASS INDEX: 35 KG/M2 | SYSTOLIC BLOOD PRESSURE: 118 MMHG | WEIGHT: 173 LBS

## 2025-08-27 DIAGNOSIS — I63.411 CEREBROVASCULAR ACCIDENT (CVA) DUE TO EMBOLISM OF RIGHT MIDDLE CEREBRAL ARTERY (HCC): Primary | ICD-10-CM

## 2025-08-27 PROCEDURE — 99215 OFFICE O/P EST HI 40 MIN: CPT | Performed by: OTHER

## (undated) DEVICE — ENDOSCOPY PACK - LOWER: Brand: MEDLINE INDUSTRIES, INC.

## (undated) DEVICE — BLADE ELECTROSURG 4IN INSULATE

## (undated) DEVICE — ALCOHOL PREP,2 PLY, MEDIUM: Brand: WEBCOL

## (undated) DEVICE — OMNIPAQUE 300 POLYB 50ML

## (undated) DEVICE — Device

## (undated) DEVICE — BITE BLOCK ADULT 60F ELASTIC

## (undated) DEVICE — WIREGUIDED CYTOLOGY BRUSH: Brand: RX CYTOLOGY BRUSH

## (undated) DEVICE — SYRINGE 10ML SLIP TIP

## (undated) DEVICE — LIMBHOLDER RSTRNT FOAM ADLT

## (undated) DEVICE — LOCKING DEVICE RX & BIOPSY CAP

## (undated) DEVICE — SYRINGE IV FLUSH PRE-FILL10M

## (undated) DEVICE — LIGHT HANDLE

## (undated) DEVICE — REM POLYHESIVE ADULT PATIENT RETURN ELECTRODE: Brand: VALLEYLAB

## (undated) DEVICE — FIRSTSTEP 200ML READY2USE GEMI

## (undated) DEVICE — GUIDEWIRE DREAM STR .035 450

## (undated) DEVICE — 3M™ RED DOT™ MONITORING ELECTRODE WITH FOAM TAPE AND STICKY GEL, 50/BAG, 20/CASE, 72/PLT 2570: Brand: RED DOT™

## (undated) DEVICE — RETRIEVAL BALLOON CATHETER: Brand: EXTRACTOR™ PRO RX

## (undated) DEVICE — SUTURE VICRYL 2-0 CT-2

## (undated) DEVICE — 1200CC GUARDIAN II: Brand: GUARDIAN

## (undated) DEVICE — BANDAID COVERLET 1X3

## (undated) DEVICE — FILTERLINE NASAL ADULT O2/CO2

## (undated) DEVICE — DRAPE PACK CHEST & U BAR

## (undated) DEVICE — DRILL SRG OIL CRTDG MAESTRO

## (undated) DEVICE — #15 STERILE STAINLESS BLADE: Brand: STERILE STAINLESS BLADES

## (undated) DEVICE — GLOVE SURG SENSICARE SZ 7

## (undated) DEVICE — DEVICE SPEC RTRVL RPTR 2.4MM

## (undated) DEVICE — NEEDLE ENDO EXPECT SL 19G EUS

## (undated) DEVICE — VIOLET BRAIDED (POLYGLACTIN 910), SYNTHETIC ABSORBABLE SUTURE: Brand: COATED VICRYL

## (undated) DEVICE — LIGACLIP EXTRA LIGATING CLIP CARTRIDGES: 6 TITANIUM CLIPS/ CARTRIDGE (SMALL): Brand: LIGACLIP

## (undated) DEVICE — SNARE CAPTIFLEX MICRO-OVL OLY

## (undated) DEVICE — ENDOSCOPIC ULTRASOUND FINE NEEDLE BIOPSY (FNB) DEVICE: Brand: ACQUIRE

## (undated) DEVICE — ACCESS AND DELIVERY CATHETER: Brand: SPYSCOPE™ DS II

## (undated) DEVICE — DISTRACTION SCREW 12MM

## (undated) DEVICE — KENDALL SCD EXPRESS SLEEVES, KNEE LENGTH, MEDIUM: Brand: KENDALL SCD

## (undated) DEVICE — PAD SACRAL SPAN AID

## (undated) DEVICE — CYTOMAX II DOUBLE LUMEN CYTOLOGY BRUSH: Brand: CYTOMAX II

## (undated) DEVICE — NVM5 NEEDLE MODULE S

## (undated) DEVICE — DRAPE MICROSCOPE NEURO PENTERO

## (undated) DEVICE — MAXCESS C MODULE

## (undated) DEVICE — CAUTERY BLADE 2IN INS E1455

## (undated) DEVICE — TAPE CLOTH ADHESIVE 3

## (undated) DEVICE — SUTURE VICRYL 0 CT-1

## (undated) DEVICE — SOL  .9 1000ML BTL

## (undated) DEVICE — BALLOON DILATATION CATHETER: Brand: HURRICANE™ RX

## (undated) DEVICE — COTTON-LEUNG BILIARY STENT
Type: IMPLANTABLE DEVICE | Status: NON-FUNCTIONAL
Brand: COTTON-LEUNG

## (undated) DEVICE — SPONGE: SPECIALTY PEANUT XR 100/CS: Brand: MEDICAL ACTION INDUSTRIES

## (undated) DEVICE — DRAPE TABLE COVER 44X90 TC-10

## (undated) DEVICE — SPHINCTEROTOME: Brand: HYDRATOME RX 44

## (undated) DEVICE — PIN SAFETY STERILE (2/PK)

## (undated) DEVICE — COTTON CANNULATOME: Brand: COTTON

## (undated) DEVICE — STERILE POLYISOPRENE POWDER-FREE SURGICAL GLOVES: Brand: PROTEXIS

## (undated) DEVICE — SYRINGE FLUSH IV PRE-FILL10M

## (undated) DEVICE — 3M(TM) STERI-STRIP(TM) ANTIMICROBIAL SKIN CLOSURES (REINFORCED) A1846: Brand: 3M™ STERI-STRIP™

## (undated) DEVICE — OASIS, ONE ACTION STENT INTRODUCTION SYSTEM: Brand: OASIS

## (undated) DEVICE — SINGLE-USE BIOPSY FORCEPS: Brand: SPYBITE MAX

## (undated) DEVICE — INFLATION DEVICE: Brand: ENCORE 26

## (undated) DEVICE — TRANSPOSAL ULTRAFLEX DUO/QUAD ULTRA CART MANIFOLD

## (undated) DEVICE — LAMINECTOMY CDS: Brand: MEDLINE INDUSTRIES, INC.

## (undated) DEVICE — NVM5 NEEDLE MODULE M/E

## (undated) DEVICE — SHEET, DRAPE, SPLIT, STERILE: Brand: MEDLINE

## (undated) NOTE — MR AVS SNAPSHOT
[facility], [address], [address] [place], [place] [address]               Thank you for choosing us for your health care visit with MICHELLE Edwards.   We are glad to serve you and happy to provide you with this physically brought to the pharmacy. A 30 day supply with no refills is the maximum allowed. ? If your prescription is due for a refill, you may be due for a follow up appointment.   ? To best provide you care, patients receiving routine medications need t Adhesive Tape Rash, Itching, Fever                Today's Vital Signs     BP Pulse                130/70 mmHg 78             Current Medications          This list is accurate as of: 4/12/17  4:23 PM.  Always use your most recent med list.

## (undated) NOTE — LETTER
[facility] Testing Department  Phone: [phone]  Right Fax: [phone]  [address] Gerardo Ibarra RN Date: 3/14/17    Patient Name: Tamiko Dobbs  Surgery Date: 3/30/2017    CSN: [id number]  Medical Record: [id number]

## (undated) NOTE — IP AVS SNAPSHOT
[facility] [place] [address] [place], [address] ~ [phone]                Discharge Summary   3/30/2017    [address]           Admission Information        Provider Department    3/30/2017 Brittanie Perez, DO [facility] 3sw-A No driving until seen by Dr. Maciej Das in the clinic or while taking narcotics  No NSAID's, Aspirin or Fish Oil products for 7 days after surgery  Notify Surgeon immediately for signs of infection: temp >101.5 or any drainage/swelling/redness at incision site ? Place collar back on after showering. ? Wear even when sleeping IF instructed to do so.  ? Exact time (weeks) of brace to be worn to be determined by surgeon; discuss at office visit  ?  Keep neck straight while removing and replacing collar (No bending, ? Sleep on back or side avoiding face down position      Diet and Constipation Prevention  ? Soft diet may help if you have discomfort while swallowing. ? Cold drinks or food may feel more soothing.   ? Drink six to eight glasses liquid (water, juice) per ? Follow up with your primary care physician one week after surgery. Review all medications. When to contact your surgeon  ? Temp > 101.5 F; take your temperature twice a day. ? Increased pain, swelling, redness, or any drainage to incision.   ? Separat Additional scheduling instructions:  (need 6 hours; follow my first case):      Testing by History:  Yes    Pre-op Orders:  Initiate my Pre-op standing orders (if none exist, use [facility] Pre-op Standing Orders)    THE [facility] Only: Initiate antibiotics Neutrophil % Lymphocyte % Monocyte % Eosinophil % Basophil % Prelim Neut Abs Final Neut Abs Lymphocyte Abso Monocyte Absolu Eosinophil Abso Basophil Absolu    (03/13/17)  57.1 (03/13/17)  28.5 (03/13/17)  8.2 (03/13/17)  4.3 (03/13/17)  1.4 -- (03/13/17) review your medications with you before you are discharged, and can provide you with additional printed information. Not all patients will experience these side effects or respond to medications the same.  Please call your provider or healthcare team if you

## (undated) NOTE — LETTER
[address]  [address] [facility] [address], [address] [facility]  Authorization for Surgical Operation and Procedure     Date:___________                                                                                                         Time:__________ of the potential risks that can occur: fever and allergic reactions, hemolytic reactions, transmission of diseases such as Hepatitis, AIDS and Cytomegalovirus (CMV) and fluid overload.   In the event that I wish to have an autologous transfusion of my own b physician will determine when the applicable recovery period ends for purposes of reinstating the DNAR order.   10. Patients having a sterilization procedure: I understand that if the procedure is successful the results will be permanent and it will therefo anesthesiologist (anesthesia doctor) to give me medicine and do additional procedures as necessary.  Some examples are: Starting or using an “IV” to give me medicine, fluids or blood during my procedure, and having a breathing tube placed to help me breathe “epidural”, & “nerve blocks”): I understand that rare but potential complications include headache, bleeding, infection, seizure, irregular heart rhythms, and nerve injury.     I can change my mind about having anesthesia services at any time before I get

## (undated) NOTE — MR AVS SNAPSHOT
[facility]  [address] [facility]                    After Visit Summary   5/24/2017    June Crystal    MRN: [id number]           Visit Information        Provider Department Dept Phone    5

## (undated) NOTE — MR AVS SNAPSHOT
[facility], [address] [address]               Thank you for choosing us for your health care visit with Pankaj Moon.   We are glad to serve you and happy to provide you with this ? Patient must present photo ID at time of . If a designated family member will be picking up prescription, office must be given name of individual in advance and they must present an ID as well. ?  The name of the person picking up your prescripti This list is accurate as of: 1/9/17  3:05 PM.  Always use your most recent med list.                aspirin 81 MG Tabs   Take 81 mg by mouth daily. CALTRATE 600 OR   Take 1 tablet by mouth daily.            MetFORMIN HCl 500 MG Tabs   Take 1 table dairy products with reduced content of saturated and total fat.    Dietary sodium reduction Reduce dietary sodium intake to <= 100 mmol per day (2.4 g sodium or 6 g sodium chloride)   Aerobic physical activity Regular aerobic physical activity (e.g., brisk Visit [facility] online at  [email or web address]

## (undated) NOTE — Clinical Note
2017    RE: James Simon     : 1/10/1940    Dear Dr. Corie Plata    This letter is to inform you that your patient is being scheduled for surgery with Dr. Crys Barreto  on 3/30/2017 at [facility]    Diagnosis: DDD (degenerative disc diseas

## (undated) NOTE — LETTER
[facility]  Phone: [phone]  Right Fax: [phone]  [facility] 20 BySearcy Rinne RN Date: 3/13/17    Patient Name: Zarina Duque  Surgery Date: 3/30/2017    CSN: [id number]  Medical Record: [id number]

## (undated) NOTE — LETTER
9/15/2021          [address] [address] [place] [address]     Dear Cee Palmer,         Here are the results of your recent EUS     The biopsy/pathology findings from your upper endoscopic ultrasound showed:  Normal biopsy of the bi

## (undated) NOTE — LETTER
Lili Campa [address]  [address] [facility] [address], [address] [facility]  Authorization for Surgical Operation and Procedure     Date:___________                                                                                                         Time:__________ of the potential risks that can occur: fever and allergic reactions, hemolytic reactions, transmission of diseases such as Hepatitis, AIDS and Cytomegalovirus (CMV) and fluid overload.   In the event that I wish to have an autologous transfusion of my own b physician will determine when the applicable recovery period ends for purposes of reinstating the DNAR order.   10. Patients having a sterilization procedure: I understand that if the procedure is successful the results will be permanent and it will therefo anesthesiologist (anesthesia doctor) to give me medicine and do additional procedures as necessary.  Some examples are: Starting or using an “IV” to give me medicine, fluids or blood during my procedure, and having a breathing tube placed to help me breathe “epidural”, & “nerve blocks”): I understand that rare but potential complications include headache, bleeding, infection, seizure, irregular heart rhythms, and nerve injury.     I can change my mind about having anesthesia services at any time before I get

## (undated) NOTE — LETTER
[address]ili [address]  [address] [facility] [address], [address] [facility]  Authorization for Surgical Operation and Procedure     Date:___________                                                                                                         Time:__________ transfusion and/or blood products.   The following are some, but not all, of the potential risks that can occur: fever and allergic reactions, hemolytic reactions, transmission of diseases such as Hepatitis, AIDS and Cytomegalovirus (CMV) and fluid overload person authorized to consent on my behalf). The surgeon or my attending physician will determine when the applicable recovery period ends for purposes of reinstating the DNAR order.   10. Patients having a sterilization procedure: I understand that if the p patient asking for anesthesia services, I agree to:  a. Allow the anesthesiologist (anesthesia doctor) to give me medicine and do additional procedures as necessary.  Some examples are: Starting or using an “IV” to give me medicine, fluids or blood during m rhythms and nerve injury. 7. Regional Anesthesia (“spinal”, “epidural”, & “nerve blocks”): I understand that rare but potential complications include headache, bleeding, infection, seizure, irregular heart rhythms, and nerve injury.     I can change my mi

## (undated) NOTE — MR AVS SNAPSHOT
[facility], [address] [address]               Thank you for choosing us for your health care visit with Roxana Grande. Brittaney Maldonado.   We are glad to serve you and happy to provide you with this physically brought to the pharmacy. A 30 day supply with no refills is the maximum allowed. ? If your prescription is due for a refill, you may be due for a follow up appointment.   ? To best provide you care, patients receiving routine medications need t Adhesive Tape Rash, Itching, Fever                Today's Vital Signs     BP Pulse Height Weight BMI    106/70 mmHg 80 60\" 198 lb 38.67 kg/m2         Current Medications          This list is accurate as of: 5/8/17  1:24 PM.  Always use your most recent

## (undated) NOTE — LETTER
[address]ili [address]  [address] [facility] [address], [address] [facility]  Authorization for Surgical Operation and Procedure     Date:___________                                                                                                         Time:__________ all, of the potential risks that can occur: fever and allergic reactions, hemolytic reactions, transmission of diseases such as Hepatitis, AIDS and Cytomegalovirus (CMV) and fluid overload.   In the event that I wish to have an autologous transfusion of my physician will determine when the applicable recovery period ends for purposes of reinstating the DNAR order.   10. Patients having a sterilization procedure: I understand that if the procedure is successful the results will be permanent and it will therefo anesthesiologist (anesthesia doctor) to give me medicine and do additional procedures as necessary.  Some examples are: Starting or using an “IV” to give me medicine, fluids or blood during my procedure, and having a breathing tube placed to help me breathe “epidural”, & “nerve blocks”): I understand that rare but potential complications include headache, bleeding, infection, seizure, irregular heart rhythms, and nerve injury.     I can change my mind about having anesthesia services at any time before I get

## (undated) NOTE — Clinical Note
[address]  [address] [place] [address], [address] [place] [address]:  [phone]  FAX:  [phone]    2017    Patient: Yasmine Torres  : 1/10/1940     Dear

## (undated) NOTE — LETTER
[address]  [address] [facility] [address], [address] [facility]  Authorization for Surgical Operation and Procedure     Date:___________                                                                                                         Time:__________ 4.   Should the need arise during my operation or immediate post-operative period, I also consent to the administration of blood and/or blood products.   Further, I understand that despite careful testing and screening of blood or blood products by dimas 8.   I recognize that in the event my procedure results in extended X-Ray/fluoroscopy time, I may develop a skin reaction. 9.  If I have a Do Not Attempt Resuscitation (DNAR) order in place, that status will be suspended while in the operating room, proc 1. ISona agree to be cared for by an anesthesiologist, who is specially trained to monitor me and give me medicine to put me to sleep or keep me comfortable during my procedure    I understand that my anesthesiologist is not an employee or 5. My doctor has explained to me other choices available to me for my care (alternatives).   6. Pregnant Patients (“epidural”):  I understand that the risks of having an epidural (medicine given into my back to help control pain during labor), include itchi

## (undated) NOTE — LETTER
[address]ili [address]  [address] [facility] [address], [address] [facility]  Authorization for Surgical Operation and Procedure     Date:___________                                                                                                         Time:__________ of the potential risks that can occur: fever and allergic reactions, hemolytic reactions, transmission of diseases such as Hepatitis, AIDS and Cytomegalovirus (CMV) and fluid overload.   In the event that I wish to have an autologous transfusion of my own b physician will determine when the applicable recovery period ends for purposes of reinstating the DNAR order.   10. Patients having a sterilization procedure: I understand that if the procedure is successful the results will be permanent and it will therefo anesthesiologist (anesthesia doctor) to give me medicine and do additional procedures as necessary.  Some examples are: Starting or using an “IV” to give me medicine, fluids or blood during my procedure, and having a breathing tube placed to help me breathe “epidural”, & “nerve blocks”): I understand that rare but potential complications include headache, bleeding, infection, seizure, irregular heart rhythms, and nerve injury.     I can change my mind about having anesthesia services at any time before I get